# Patient Record
Sex: FEMALE | Race: BLACK OR AFRICAN AMERICAN | Employment: OTHER
[De-identification: names, ages, dates, MRNs, and addresses within clinical notes are randomized per-mention and may not be internally consistent; named-entity substitution may affect disease eponyms.]

---

## 2017-02-23 DIAGNOSIS — I10 ESSENTIAL HYPERTENSION: ICD-10-CM

## 2017-02-25 RX ORDER — METOPROLOL SUCCINATE 50 MG/1
TABLET, EXTENDED RELEASE ORAL
Qty: 30 TABLET | Refills: 5 | Status: SHIPPED | OUTPATIENT
Start: 2017-02-25 | End: 2017-06-07 | Stop reason: SDUPTHER

## 2017-03-24 RX ORDER — INSULIN GLARGINE 100 [IU]/ML
INJECTION, SOLUTION SUBCUTANEOUS
Qty: 4 PEN | Refills: 1 | Status: SHIPPED | OUTPATIENT
Start: 2017-03-24 | End: 2017-06-07 | Stop reason: SDUPTHER

## 2017-04-04 DIAGNOSIS — I10 ESSENTIAL HYPERTENSION: ICD-10-CM

## 2017-04-05 RX ORDER — VALSARTAN 80 MG/1
TABLET ORAL
Qty: 30 TABLET | Refills: 4 | Status: SHIPPED | OUTPATIENT
Start: 2017-04-05 | End: 2017-06-07 | Stop reason: SDUPTHER

## 2017-04-27 DIAGNOSIS — I10 ESSENTIAL HYPERTENSION: ICD-10-CM

## 2017-04-27 RX ORDER — AMLODIPINE BESYLATE 10 MG/1
TABLET ORAL
Qty: 30 TABLET | Refills: 0 | Status: SHIPPED | OUTPATIENT
Start: 2017-04-27 | End: 2017-06-05 | Stop reason: SDUPTHER

## 2017-05-03 DIAGNOSIS — D50.0 IRON DEFICIENCY ANEMIA DUE TO CHRONIC BLOOD LOSS: Primary | ICD-10-CM

## 2017-05-05 ENCOUNTER — HOSPITAL ENCOUNTER (OUTPATIENT)
Facility: MEDICAL CENTER | Age: 72
End: 2017-05-05
Payer: MEDICARE

## 2017-05-12 ENCOUNTER — HOSPITAL ENCOUNTER (OUTPATIENT)
Facility: MEDICAL CENTER | Age: 72
End: 2017-05-12
Payer: MEDICARE

## 2017-06-05 DIAGNOSIS — I10 ESSENTIAL HYPERTENSION: ICD-10-CM

## 2017-06-06 RX ORDER — AMLODIPINE BESYLATE 10 MG/1
TABLET ORAL
Qty: 30 TABLET | Refills: 5 | Status: SHIPPED | OUTPATIENT
Start: 2017-06-06 | End: 2017-06-07 | Stop reason: SDUPTHER

## 2017-06-07 ENCOUNTER — OFFICE VISIT (OUTPATIENT)
Dept: INTERNAL MEDICINE CLINIC | Age: 72
End: 2017-06-07
Payer: MEDICARE

## 2017-06-07 ENCOUNTER — HOSPITAL ENCOUNTER (OUTPATIENT)
Age: 72
Setting detail: SPECIMEN
Discharge: HOME | End: 2017-06-07

## 2017-06-07 VITALS
OXYGEN SATURATION: 94 % | RESPIRATION RATE: 16 BRPM | SYSTOLIC BLOOD PRESSURE: 144 MMHG | WEIGHT: 231 LBS | DIASTOLIC BLOOD PRESSURE: 80 MMHG | HEIGHT: 67 IN | TEMPERATURE: 98.6 F | HEART RATE: 88 BPM | BODY MASS INDEX: 36.26 KG/M2

## 2017-06-07 DIAGNOSIS — Z12.39 SCREENING BREAST EXAMINATION: ICD-10-CM

## 2017-06-07 DIAGNOSIS — Z11.59 NEED FOR HEPATITIS C SCREENING TEST: ICD-10-CM

## 2017-06-07 DIAGNOSIS — Z12.39 BREAST CANCER SCREENING: ICD-10-CM

## 2017-06-07 DIAGNOSIS — Z12.11 COLON CANCER SCREENING: ICD-10-CM

## 2017-06-07 DIAGNOSIS — M27.0 TORUS PALATINUS: ICD-10-CM

## 2017-06-07 DIAGNOSIS — D50.0 IRON DEFICIENCY ANEMIA DUE TO CHRONIC BLOOD LOSS: ICD-10-CM

## 2017-06-07 DIAGNOSIS — Z12.31 ENCOUNTER FOR MAMMOGRAM TO ESTABLISH BASELINE MAMMOGRAM: ICD-10-CM

## 2017-06-07 DIAGNOSIS — K21.9 GASTROESOPHAGEAL REFLUX DISEASE WITHOUT ESOPHAGITIS: ICD-10-CM

## 2017-06-07 DIAGNOSIS — I10 ESSENTIAL HYPERTENSION: ICD-10-CM

## 2017-06-07 LAB
CREATININE URINE: 112.4 MG/DL (ref 28–217)
HBA1C MFR BLD: 8.8 %
HEPATITIS C ANTIBODY: NONREACTIVE
IRON SATURATION: 27 % (ref 20–55)
IRON: 76 UG/DL (ref 37–145)
MICROALBUMIN/CREAT 24H UR: 1719 MG/L
MICROALBUMIN/CREAT UR-RTO: 1529 MCG/MG CREAT
TOTAL IRON BINDING CAPACITY: 281 UG/DL (ref 250–450)
UNSATURATED IRON BINDING CAPACITY: 205 UG/DL (ref 112–347)

## 2017-06-07 PROCEDURE — G8427 DOCREV CUR MEDS BY ELIG CLIN: HCPCS | Performed by: FAMILY MEDICINE

## 2017-06-07 PROCEDURE — 83036 HEMOGLOBIN GLYCOSYLATED A1C: CPT | Performed by: FAMILY MEDICINE

## 2017-06-07 PROCEDURE — 4040F PNEUMOC VAC/ADMIN/RCVD: CPT | Performed by: FAMILY MEDICINE

## 2017-06-07 PROCEDURE — 3014F SCREEN MAMMO DOC REV: CPT | Performed by: FAMILY MEDICINE

## 2017-06-07 PROCEDURE — 3017F COLORECTAL CA SCREEN DOC REV: CPT | Performed by: FAMILY MEDICINE

## 2017-06-07 PROCEDURE — 1090F PRES/ABSN URINE INCON ASSESS: CPT | Performed by: FAMILY MEDICINE

## 2017-06-07 PROCEDURE — G8400 PT W/DXA NO RESULTS DOC: HCPCS | Performed by: FAMILY MEDICINE

## 2017-06-07 PROCEDURE — 3045F PR MOST RECENT HEMOGLOBIN A1C LEVEL 7.0-9.0%: CPT | Performed by: FAMILY MEDICINE

## 2017-06-07 PROCEDURE — 1123F ACP DISCUSS/DSCN MKR DOCD: CPT | Performed by: FAMILY MEDICINE

## 2017-06-07 PROCEDURE — G8419 CALC BMI OUT NRM PARAM NOF/U: HCPCS | Performed by: FAMILY MEDICINE

## 2017-06-07 PROCEDURE — 4004F PT TOBACCO SCREEN RCVD TLK: CPT | Performed by: FAMILY MEDICINE

## 2017-06-07 PROCEDURE — 99214 OFFICE O/P EST MOD 30 MIN: CPT | Performed by: FAMILY MEDICINE

## 2017-06-07 RX ORDER — AMLODIPINE BESYLATE 10 MG/1
TABLET ORAL
Qty: 90 TABLET | Refills: 1 | Status: SHIPPED | OUTPATIENT
Start: 2017-06-07 | End: 2018-01-24 | Stop reason: SDUPTHER

## 2017-06-07 RX ORDER — VALSARTAN 80 MG/1
TABLET ORAL
Qty: 90 TABLET | Refills: 1 | Status: SHIPPED | OUTPATIENT
Start: 2017-06-07 | End: 2018-05-31 | Stop reason: SDUPTHER

## 2017-06-07 RX ORDER — METOPROLOL SUCCINATE 50 MG/1
TABLET, EXTENDED RELEASE ORAL
Qty: 90 TABLET | Refills: 1 | Status: SHIPPED | OUTPATIENT
Start: 2017-06-07 | End: 2017-09-26 | Stop reason: SDUPTHER

## 2017-06-07 RX ORDER — METFORMIN HYDROCHLORIDE 500 MG/1
TABLET, EXTENDED RELEASE ORAL
Qty: 180 TABLET | Refills: 1 | Status: SHIPPED | OUTPATIENT
Start: 2017-06-07 | End: 2018-11-28 | Stop reason: SDUPTHER

## 2017-06-07 ASSESSMENT — ENCOUNTER SYMPTOMS
CHEST TIGHTNESS: 0
TROUBLE SWALLOWING: 0
COUGH: 0
STRIDOR: 0
ABDOMINAL DISTENTION: 0
EYE PAIN: 0
CONSTIPATION: 0
EYE DISCHARGE: 0
ABDOMINAL PAIN: 0
FACIAL SWELLING: 0
COLOR CHANGE: 0
ANAL BLEEDING: 0
WHEEZING: 0
SHORTNESS OF BREATH: 0
EYE REDNESS: 0
BACK PAIN: 0
SORE THROAT: 0

## 2017-06-07 ASSESSMENT — PATIENT HEALTH QUESTIONNAIRE - PHQ9
SUM OF ALL RESPONSES TO PHQ QUESTIONS 1-9: 0
SUM OF ALL RESPONSES TO PHQ9 QUESTIONS 1 & 2: 0
1. LITTLE INTEREST OR PLEASURE IN DOING THINGS: 0
2. FEELING DOWN, DEPRESSED OR HOPELESS: 0

## 2017-06-12 RX ORDER — MULTIVITAMIN
1 TABLET ORAL DAILY
Qty: 30 TABLET | Refills: 0 | Status: SHIPPED | OUTPATIENT
Start: 2017-06-12 | End: 2018-08-21 | Stop reason: SDUPTHER

## 2017-07-31 ENCOUNTER — OFFICE VISIT (OUTPATIENT)
Dept: PODIATRY | Age: 72
End: 2017-07-31
Payer: MEDICARE

## 2017-07-31 VITALS
TEMPERATURE: 98.3 F | SYSTOLIC BLOOD PRESSURE: 143 MMHG | HEART RATE: 97 BPM | BODY MASS INDEX: 36.88 KG/M2 | DIASTOLIC BLOOD PRESSURE: 78 MMHG | WEIGHT: 235 LBS | HEIGHT: 67 IN

## 2017-07-31 DIAGNOSIS — M20.5X2 HALLUX LIMITUS, LEFT: ICD-10-CM

## 2017-07-31 DIAGNOSIS — L84 CALLUS OF FOOT: ICD-10-CM

## 2017-07-31 DIAGNOSIS — M20.5X1 HALLUX LIMITUS, RIGHT: ICD-10-CM

## 2017-07-31 DIAGNOSIS — L60.2 NAIL DISORDER (ONYCHOGRYPHOSIS): ICD-10-CM

## 2017-07-31 PROCEDURE — 11721 DEBRIDE NAIL 6 OR MORE: CPT | Performed by: STUDENT IN AN ORGANIZED HEALTH CARE EDUCATION/TRAINING PROGRAM

## 2017-07-31 PROCEDURE — 99203 OFFICE O/P NEW LOW 30 MIN: CPT | Performed by: STUDENT IN AN ORGANIZED HEALTH CARE EDUCATION/TRAINING PROGRAM

## 2017-07-31 PROCEDURE — 11055 PARING/CUTG B9 HYPRKER LES 1: CPT | Performed by: STUDENT IN AN ORGANIZED HEALTH CARE EDUCATION/TRAINING PROGRAM

## 2017-08-28 ENCOUNTER — TELEPHONE (OUTPATIENT)
Dept: PODIATRY | Age: 72
End: 2017-08-28

## 2017-09-02 RX ORDER — PEN NEEDLE, DIABETIC 31 G X1/4"
NEEDLE, DISPOSABLE MISCELLANEOUS
Qty: 100 EACH | Refills: 5 | Status: SHIPPED | OUTPATIENT
Start: 2017-09-02 | End: 2018-12-26 | Stop reason: SDUPTHER

## 2017-09-06 DIAGNOSIS — K21.9 GASTROESOPHAGEAL REFLUX DISEASE WITHOUT ESOPHAGITIS: ICD-10-CM

## 2017-09-09 RX ORDER — OMEPRAZOLE 20 MG/1
CAPSULE, DELAYED RELEASE ORAL
Qty: 30 CAPSULE | Refills: 4 | Status: SHIPPED | OUTPATIENT
Start: 2017-09-09 | End: 2018-03-26 | Stop reason: SDUPTHER

## 2017-09-11 DIAGNOSIS — J45.20 MILD INTERMITTENT ASTHMA WITHOUT COMPLICATION: ICD-10-CM

## 2017-09-26 DIAGNOSIS — I10 ESSENTIAL HYPERTENSION: ICD-10-CM

## 2017-10-01 RX ORDER — METOPROLOL SUCCINATE 50 MG/1
TABLET, EXTENDED RELEASE ORAL
Qty: 30 TABLET | Refills: 5 | Status: SHIPPED | OUTPATIENT
Start: 2017-10-01 | End: 2018-01-24 | Stop reason: SDUPTHER

## 2018-01-24 DIAGNOSIS — I10 ESSENTIAL HYPERTENSION: ICD-10-CM

## 2018-01-24 RX ORDER — AMLODIPINE BESYLATE 10 MG/1
TABLET ORAL
Qty: 30 TABLET | Refills: 1 | Status: SHIPPED | OUTPATIENT
Start: 2018-01-24 | End: 2018-03-30 | Stop reason: SDUPTHER

## 2018-01-24 RX ORDER — METOPROLOL SUCCINATE 50 MG/1
50 TABLET, EXTENDED RELEASE ORAL DAILY
Qty: 30 TABLET | Refills: 1 | Status: SHIPPED | OUTPATIENT
Start: 2018-01-24 | End: 2018-07-24 | Stop reason: SDUPTHER

## 2018-03-14 DIAGNOSIS — K21.9 GASTROESOPHAGEAL REFLUX DISEASE WITHOUT ESOPHAGITIS: ICD-10-CM

## 2018-03-15 RX ORDER — OMEPRAZOLE 20 MG/1
CAPSULE, DELAYED RELEASE ORAL
Qty: 30 CAPSULE | Refills: 4 | OUTPATIENT
Start: 2018-03-15

## 2018-03-26 ENCOUNTER — OFFICE VISIT (OUTPATIENT)
Dept: INTERNAL MEDICINE CLINIC | Age: 73
End: 2018-03-26
Payer: MEDICARE

## 2018-03-26 VITALS
HEIGHT: 67 IN | WEIGHT: 233.8 LBS | BODY MASS INDEX: 36.7 KG/M2 | HEART RATE: 68 BPM | RESPIRATION RATE: 16 BRPM | OXYGEN SATURATION: 94 % | DIASTOLIC BLOOD PRESSURE: 88 MMHG | SYSTOLIC BLOOD PRESSURE: 138 MMHG

## 2018-03-26 DIAGNOSIS — K21.9 GASTROESOPHAGEAL REFLUX DISEASE WITHOUT ESOPHAGITIS: ICD-10-CM

## 2018-03-26 DIAGNOSIS — E78.49 OTHER HYPERLIPIDEMIA: ICD-10-CM

## 2018-03-26 DIAGNOSIS — I10 ESSENTIAL HYPERTENSION: ICD-10-CM

## 2018-03-26 DIAGNOSIS — Z78.0 POST-MENOPAUSAL: ICD-10-CM

## 2018-03-26 DIAGNOSIS — J44.9 CHRONIC OBSTRUCTIVE PULMONARY DISEASE, UNSPECIFIED COPD TYPE (HCC): ICD-10-CM

## 2018-03-26 DIAGNOSIS — Z72.0 TOBACCO ABUSE: ICD-10-CM

## 2018-03-26 DIAGNOSIS — I83.90 VARICOSE VEIN OF LEG: ICD-10-CM

## 2018-03-26 DIAGNOSIS — E87.6 HYPOKALEMIA: ICD-10-CM

## 2018-03-26 PROCEDURE — 3017F COLORECTAL CA SCREEN DOC REV: CPT | Performed by: FAMILY MEDICINE

## 2018-03-26 PROCEDURE — 99214 OFFICE O/P EST MOD 30 MIN: CPT | Performed by: FAMILY MEDICINE

## 2018-03-26 PROCEDURE — G8484 FLU IMMUNIZE NO ADMIN: HCPCS | Performed by: FAMILY MEDICINE

## 2018-03-26 PROCEDURE — 4040F PNEUMOC VAC/ADMIN/RCVD: CPT | Performed by: FAMILY MEDICINE

## 2018-03-26 PROCEDURE — 3014F SCREEN MAMMO DOC REV: CPT | Performed by: FAMILY MEDICINE

## 2018-03-26 PROCEDURE — 1090F PRES/ABSN URINE INCON ASSESS: CPT | Performed by: FAMILY MEDICINE

## 2018-03-26 PROCEDURE — 4004F PT TOBACCO SCREEN RCVD TLK: CPT | Performed by: FAMILY MEDICINE

## 2018-03-26 PROCEDURE — G8400 PT W/DXA NO RESULTS DOC: HCPCS | Performed by: FAMILY MEDICINE

## 2018-03-26 PROCEDURE — 3046F HEMOGLOBIN A1C LEVEL >9.0%: CPT | Performed by: FAMILY MEDICINE

## 2018-03-26 PROCEDURE — 1123F ACP DISCUSS/DSCN MKR DOCD: CPT | Performed by: FAMILY MEDICINE

## 2018-03-26 PROCEDURE — 3023F SPIROM DOC REV: CPT | Performed by: FAMILY MEDICINE

## 2018-03-26 PROCEDURE — G8427 DOCREV CUR MEDS BY ELIG CLIN: HCPCS | Performed by: FAMILY MEDICINE

## 2018-03-26 PROCEDURE — G8926 SPIRO NO PERF OR DOC: HCPCS | Performed by: FAMILY MEDICINE

## 2018-03-26 PROCEDURE — G8417 CALC BMI ABV UP PARAM F/U: HCPCS | Performed by: FAMILY MEDICINE

## 2018-03-26 RX ORDER — OMEPRAZOLE 20 MG/1
20 CAPSULE, DELAYED RELEASE ORAL DAILY
Qty: 30 CAPSULE | Refills: 2 | Status: ON HOLD | OUTPATIENT
Start: 2018-03-26 | End: 2020-08-24

## 2018-03-26 RX ORDER — POTASSIUM CHLORIDE 20 MEQ/1
TABLET, EXTENDED RELEASE ORAL
Qty: 90 TABLET | Refills: 3 | Status: SHIPPED | OUTPATIENT
Start: 2018-03-26 | End: 2019-06-27 | Stop reason: SDUPTHER

## 2018-03-26 RX ORDER — BUDESONIDE AND FORMOTEROL FUMARATE DIHYDRATE 160; 4.5 UG/1; UG/1
2 AEROSOL RESPIRATORY (INHALATION) 2 TIMES DAILY
Qty: 1 INHALER | Refills: 2 | Status: SHIPPED | OUTPATIENT
Start: 2018-03-26 | End: 2018-11-20 | Stop reason: SDUPTHER

## 2018-03-26 RX ORDER — INSULIN LISPRO 100 [IU]/ML
INJECTION, SOLUTION INTRAVENOUS; SUBCUTANEOUS
COMMUNITY
Start: 2018-01-12 | End: 2019-01-15 | Stop reason: SDUPTHER

## 2018-03-27 ASSESSMENT — ENCOUNTER SYMPTOMS
GASTROINTESTINAL NEGATIVE: 1
EYES NEGATIVE: 1
RESPIRATORY NEGATIVE: 1
ALLERGIC/IMMUNOLOGIC NEGATIVE: 1

## 2018-03-27 NOTE — PROGRESS NOTES
sounds and intact distal pulses. Pulmonary/Chest: Effort normal and breath sounds normal.   Abdominal: Soft. Bowel sounds are normal.   Genitourinary: Vagina normal and uterus normal.   Musculoskeletal: Normal range of motion. Neurological: She is alert and oriented to person, place, and time. She has normal reflexes. Skin: Skin is warm and dry. Psychiatric: She has a normal mood and affect. Her behavior is normal. Judgment and thought content normal.   Vitals reviewed. Assessment:      1. Uncontrolled type 1 diabetes mellitus with stage 3 chronic kidney disease (HCC)  Lipid Panel    TSH With Reflex Ft4    CBC With Auto Differential    Comprehensive Metabolic Panel    Hemoglobin A1C    HIV Screen    Hepatitis C Antibody    DEXA BONE DENSITY 2 SITES   2. Essential hypertension  Lipid Panel    TSH With Reflex Ft4    CBC With Auto Differential    Comprehensive Metabolic Panel    Hemoglobin A1C    HIV Screen    Hepatitis C Antibody    DEXA BONE DENSITY 2 SITES    potassium chloride (KLOR-CON M) 20 MEQ extended release tablet   3. Other hyperlipidemia  Lipid Panel    TSH With Reflex Ft4    CBC With Auto Differential    Comprehensive Metabolic Panel    Hemoglobin A1C    HIV Screen    Hepatitis C Antibody    DEXA BONE DENSITY 2 SITES   4. Post-menopausal     5. Hypokalemia  potassium chloride (KLOR-CON M) 20 MEQ extended release tablet   6. Gastroesophageal reflux disease without esophagitis  omeprazole (PRILOSEC) 20 MG delayed release capsule   7. Chronic obstructive pulmonary disease, unspecified COPD type ([facility] [address]  CT CHEST DIAGNOSTIC LOW DOSE   8. Tobacco abuse  CT CHEST DIAGNOSTIC LOW DOSE   9. Varicose vein of leg             Plan:      77-year-old -American female is presented for follow-up. She denies any distress, afebrile hemodynamically stable, clinical examination is benign. Insulin-dependent diabetes mellitus with regular follow-up with endocrinology.   Patient stated that his recent A1c has

## 2018-04-16 ENCOUNTER — HOSPITAL ENCOUNTER (OUTPATIENT)
Dept: CT IMAGING | Age: 73
Discharge: HOME | End: 2018-04-18
Payer: MEDICARE

## 2018-04-16 ENCOUNTER — HOSPITAL ENCOUNTER (OUTPATIENT)
Dept: MAMMOGRAPHY | Age: 73
Discharge: HOME | End: 2018-04-18
Payer: MEDICARE

## 2018-04-16 DIAGNOSIS — Z72.0 TOBACCO ABUSE: ICD-10-CM

## 2018-04-16 DIAGNOSIS — I10 ESSENTIAL HYPERTENSION: ICD-10-CM

## 2018-04-16 DIAGNOSIS — J44.9 CHRONIC OBSTRUCTIVE PULMONARY DISEASE, UNSPECIFIED COPD TYPE (HCC): ICD-10-CM

## 2018-04-16 DIAGNOSIS — E78.49 OTHER HYPERLIPIDEMIA: ICD-10-CM

## 2018-04-16 PROCEDURE — 71250 CT THORAX DX C-: CPT

## 2018-04-16 PROCEDURE — 77080 DXA BONE DENSITY AXIAL: CPT

## 2018-05-31 DIAGNOSIS — I10 ESSENTIAL HYPERTENSION: ICD-10-CM

## 2018-05-31 RX ORDER — VALSARTAN 80 MG/1
TABLET ORAL
Qty: 90 TABLET | Refills: 1 | Status: SHIPPED | OUTPATIENT
Start: 2018-05-31 | End: 2019-04-18 | Stop reason: ALTCHOICE

## 2018-07-24 DIAGNOSIS — I10 ESSENTIAL HYPERTENSION: ICD-10-CM

## 2018-07-25 RX ORDER — METOPROLOL SUCCINATE 50 MG/1
50 TABLET, EXTENDED RELEASE ORAL DAILY
Qty: 30 TABLET | Refills: 1 | Status: SHIPPED | OUTPATIENT
Start: 2018-07-25 | End: 2018-11-07 | Stop reason: SDUPTHER

## 2018-08-01 ENCOUNTER — TELEPHONE (OUTPATIENT)
Dept: ONCOLOGY | Age: 73
End: 2018-08-01

## 2018-08-01 NOTE — TELEPHONE ENCOUNTER
RECEIVED REFILL REQUEST FOR ORAL IRON. PT HAS NOT BEEN SEEN 11/2016  WRITER NOTIFIED ROSE PT NEEDS APPT FOR REFILL. WRITER GENERATED PINK SLIP TO FRONT OFFICE TO CONTACT PT TO COORDINATE / OFFER A FOLLOW UP APPT AS WELL.

## 2018-08-08 ENCOUNTER — HOSPITAL ENCOUNTER (OUTPATIENT)
Facility: MEDICAL CENTER | Age: 73
End: 2018-08-08
Payer: MEDICARE

## 2018-08-13 ENCOUNTER — TELEPHONE (OUTPATIENT)
Dept: INFUSION THERAPY | Facility: MEDICAL CENTER | Age: 73
End: 2018-08-13

## 2018-08-21 ENCOUNTER — TELEPHONE (OUTPATIENT)
Dept: INFUSION THERAPY | Facility: MEDICAL CENTER | Age: 73
End: 2018-08-21

## 2018-08-21 DIAGNOSIS — K21.9 GASTROESOPHAGEAL REFLUX DISEASE WITHOUT ESOPHAGITIS: ICD-10-CM

## 2018-08-21 RX ORDER — MULTIVITAMIN
1 TABLET ORAL DAILY
Qty: 90 TABLET | Refills: 0 | Status: SHIPPED | OUTPATIENT
Start: 2018-08-21 | End: 2019-02-13 | Stop reason: SDUPTHER

## 2018-08-21 RX ORDER — OMEPRAZOLE 20 MG/1
CAPSULE, DELAYED RELEASE ORAL
Qty: 30 CAPSULE | Refills: 2 | OUTPATIENT
Start: 2018-08-21

## 2018-10-17 ENCOUNTER — TELEPHONE (OUTPATIENT)
Dept: INTERNAL MEDICINE CLINIC | Age: 73
End: 2018-10-17

## 2018-10-17 DIAGNOSIS — I10 ESSENTIAL HYPERTENSION: Primary | ICD-10-CM

## 2018-10-19 RX ORDER — LOSARTAN POTASSIUM 50 MG/1
50 TABLET ORAL DAILY
Qty: 30 TABLET | Refills: 3 | Status: ON HOLD | OUTPATIENT
Start: 2018-10-19 | End: 2020-08-24

## 2018-11-05 DIAGNOSIS — I10 ESSENTIAL HYPERTENSION: ICD-10-CM

## 2018-11-06 RX ORDER — AMLODIPINE BESYLATE 10 MG/1
TABLET ORAL
Qty: 30 TABLET | Refills: 0 | Status: SHIPPED | OUTPATIENT
Start: 2018-11-06 | End: 2018-12-26 | Stop reason: SDUPTHER

## 2018-11-07 DIAGNOSIS — I10 ESSENTIAL HYPERTENSION: ICD-10-CM

## 2018-11-08 RX ORDER — METOPROLOL SUCCINATE 50 MG/1
50 TABLET, EXTENDED RELEASE ORAL DAILY
Qty: 90 TABLET | Refills: 2 | Status: SHIPPED | OUTPATIENT
Start: 2018-11-08 | End: 2019-12-31

## 2018-11-21 DIAGNOSIS — J45.20 MILD INTERMITTENT ASTHMA WITHOUT COMPLICATION: ICD-10-CM

## 2018-11-21 RX ORDER — ALBUTEROL SULFATE 90 UG/1
2 AEROSOL, METERED RESPIRATORY (INHALATION) EVERY 6 HOURS PRN
Qty: 1 INHALER | Refills: 3 | Status: SHIPPED | OUTPATIENT
Start: 2018-11-21 | End: 2019-10-30 | Stop reason: SDUPTHER

## 2018-11-23 RX ORDER — BUDESONIDE AND FORMOTEROL FUMARATE DIHYDRATE 160; 4.5 UG/1; UG/1
AEROSOL RESPIRATORY (INHALATION)
Qty: 1 INHALER | Refills: 1 | Status: SHIPPED | OUTPATIENT
Start: 2018-11-23 | End: 2019-04-02 | Stop reason: SDUPTHER

## 2018-11-28 RX ORDER — METFORMIN HYDROCHLORIDE 500 MG/1
TABLET, EXTENDED RELEASE ORAL
Qty: 180 TABLET | Refills: 1 | Status: SHIPPED | OUTPATIENT
Start: 2018-11-28 | End: 2019-08-30 | Stop reason: SDUPTHER

## 2018-12-26 DIAGNOSIS — I10 ESSENTIAL HYPERTENSION: ICD-10-CM

## 2018-12-27 RX ORDER — PEN NEEDLE, DIABETIC 31 G X1/4"
1 NEEDLE, DISPOSABLE MISCELLANEOUS 2 TIMES DAILY
Qty: 200 EACH | Refills: 1 | Status: SHIPPED | OUTPATIENT
Start: 2018-12-27 | End: 2020-10-13

## 2018-12-27 RX ORDER — AMLODIPINE BESYLATE 10 MG/1
10 TABLET ORAL DAILY
Qty: 90 TABLET | Refills: 1 | Status: SHIPPED | OUTPATIENT
Start: 2018-12-27 | End: 2019-07-30 | Stop reason: SDUPTHER

## 2019-01-09 RX ORDER — INSULIN LISPRO 100 [IU]/ML
INJECTION, SOLUTION INTRAVENOUS; SUBCUTANEOUS
Qty: 5 PEN | Refills: 2 | OUTPATIENT
Start: 2019-01-09

## 2019-01-14 ENCOUNTER — OFFICE VISIT (OUTPATIENT)
Dept: INTERNAL MEDICINE CLINIC | Age: 74
End: 2019-01-14
Payer: MEDICARE

## 2019-01-14 ENCOUNTER — HOSPITAL ENCOUNTER (OUTPATIENT)
Age: 74
Setting detail: SPECIMEN
Discharge: HOME | End: 2019-01-14
Payer: MEDICARE

## 2019-01-14 VITALS
DIASTOLIC BLOOD PRESSURE: 80 MMHG | WEIGHT: 230.6 LBS | OXYGEN SATURATION: 100 % | RESPIRATION RATE: 24 BRPM | HEART RATE: 97 BPM | SYSTOLIC BLOOD PRESSURE: 140 MMHG | HEIGHT: 67 IN | BODY MASS INDEX: 36.19 KG/M2

## 2019-01-14 DIAGNOSIS — I10 ESSENTIAL HYPERTENSION: ICD-10-CM

## 2019-01-14 DIAGNOSIS — Z72.0 TOBACCO ABUSE: ICD-10-CM

## 2019-01-14 DIAGNOSIS — E78.49 OTHER HYPERLIPIDEMIA: ICD-10-CM

## 2019-01-14 DIAGNOSIS — K21.9 GASTROESOPHAGEAL REFLUX DISEASE WITHOUT ESOPHAGITIS: ICD-10-CM

## 2019-01-14 DIAGNOSIS — Z12.39 BREAST SCREENING: ICD-10-CM

## 2019-01-14 DIAGNOSIS — Z28.21 PNEUMOCOCCAL VACCINATION DECLINED: ICD-10-CM

## 2019-01-14 DIAGNOSIS — I87.2 VENOUS INSUFFICIENCY, PERIPHERAL: ICD-10-CM

## 2019-01-14 DIAGNOSIS — D50.8 OTHER IRON DEFICIENCY ANEMIA: ICD-10-CM

## 2019-01-14 DIAGNOSIS — Z28.21 INFLUENZA VACCINATION DECLINED: ICD-10-CM

## 2019-01-14 DIAGNOSIS — J44.9 CHRONIC OBSTRUCTIVE PULMONARY DISEASE, UNSPECIFIED COPD TYPE (HCC): ICD-10-CM

## 2019-01-14 DIAGNOSIS — Z71.6 TOBACCO ABUSE COUNSELING: ICD-10-CM

## 2019-01-14 LAB
ALBUMIN SERPL-MCNC: 3.2 G/DL (ref 3.5–5.2)
ALBUMIN/GLOBULIN RATIO: 0.8 (ref 1–2.5)
ALP BLD-CCNC: 125 U/L (ref 35–104)
ALT SERPL-CCNC: 12 U/L (ref 5–33)
ANION GAP SERPL CALCULATED.3IONS-SCNC: 16 MMOL/L (ref 9–17)
AST SERPL-CCNC: 14 U/L
BILIRUB SERPL-MCNC: 0.29 MG/DL (ref 0.3–1.2)
BUN BLDV-MCNC: 9 MG/DL (ref 8–23)
BUN/CREAT BLD: ABNORMAL (ref 9–20)
CALCIUM SERPL-MCNC: 8.7 MG/DL (ref 8.6–10.4)
CHLORIDE BLD-SCNC: 103 MMOL/L (ref 98–107)
CHOLESTEROL/HDL RATIO: 2.1
CHOLESTEROL: 105 MG/DL
CO2: 25 MMOL/L (ref 20–31)
CREAT SERPL-MCNC: 0.82 MG/DL (ref 0.5–0.9)
CREATININE URINE: 226.5 MG/DL (ref 28–217)
GFR AFRICAN AMERICAN: >60 ML/MIN
GFR NON-AFRICAN AMERICAN: >60 ML/MIN
GFR SERPL CREATININE-BSD FRML MDRD: ABNORMAL ML/MIN/{1.73_M2}
GFR SERPL CREATININE-BSD FRML MDRD: ABNORMAL ML/MIN/{1.73_M2}
GLUCOSE BLD-MCNC: 119 MG/DL (ref 70–99)
HCT VFR BLD CALC: 33.4 % (ref 36.3–47.1)
HDLC SERPL-MCNC: 49 MG/DL
HEMOGLOBIN: 9 G/DL (ref 11.9–15.1)
LDL CHOLESTEROL: 32 MG/DL (ref 0–130)
MCH RBC QN AUTO: 18.1 PG (ref 25.2–33.5)
MCHC RBC AUTO-ENTMCNC: 26.9 G/DL (ref 28.4–34.8)
MCV RBC AUTO: 67.3 FL (ref 82.6–102.9)
MICROALBUMIN/CREAT 24H UR: 4183 MG/L
MICROALBUMIN/CREAT UR-RTO: 1847 MCG/MG CREAT
NRBC AUTOMATED: 0.4 PER 100 WBC
PDW BLD-RTO: 20.4 % (ref 11.8–14.4)
PLATELET # BLD: 460 K/UL (ref 138–453)
PMV BLD AUTO: 10.3 FL (ref 8.1–13.5)
POTASSIUM SERPL-SCNC: 3.1 MMOL/L (ref 3.7–5.3)
RBC # BLD: 4.96 M/UL (ref 3.95–5.11)
SODIUM BLD-SCNC: 144 MMOL/L (ref 135–144)
TOTAL PROTEIN: 7.3 G/DL (ref 6.4–8.3)
TRIGL SERPL-MCNC: 119 MG/DL
TSH SERPL DL<=0.05 MIU/L-ACNC: 1.32 MIU/L (ref 0.3–5)
VLDLC SERPL CALC-MCNC: NORMAL MG/DL (ref 1–30)
WBC # BLD: 17.4 K/UL (ref 3.5–11.3)

## 2019-01-14 PROCEDURE — 2022F DILAT RTA XM EVC RTNOPTHY: CPT | Performed by: FAMILY MEDICINE

## 2019-01-14 PROCEDURE — G8399 PT W/DXA RESULTS DOCUMENT: HCPCS | Performed by: FAMILY MEDICINE

## 2019-01-14 PROCEDURE — G8926 SPIRO NO PERF OR DOC: HCPCS | Performed by: FAMILY MEDICINE

## 2019-01-14 PROCEDURE — 1101F PT FALLS ASSESS-DOCD LE1/YR: CPT | Performed by: FAMILY MEDICINE

## 2019-01-14 PROCEDURE — 4004F PT TOBACCO SCREEN RCVD TLK: CPT | Performed by: FAMILY MEDICINE

## 2019-01-14 PROCEDURE — 1090F PRES/ABSN URINE INCON ASSESS: CPT | Performed by: FAMILY MEDICINE

## 2019-01-14 PROCEDURE — 4040F PNEUMOC VAC/ADMIN/RCVD: CPT | Performed by: FAMILY MEDICINE

## 2019-01-14 PROCEDURE — 3023F SPIROM DOC REV: CPT | Performed by: FAMILY MEDICINE

## 2019-01-14 PROCEDURE — 1123F ACP DISCUSS/DSCN MKR DOCD: CPT | Performed by: FAMILY MEDICINE

## 2019-01-14 PROCEDURE — 3046F HEMOGLOBIN A1C LEVEL >9.0%: CPT | Performed by: FAMILY MEDICINE

## 2019-01-14 PROCEDURE — 3017F COLORECTAL CA SCREEN DOC REV: CPT | Performed by: FAMILY MEDICINE

## 2019-01-14 PROCEDURE — G8484 FLU IMMUNIZE NO ADMIN: HCPCS | Performed by: FAMILY MEDICINE

## 2019-01-14 PROCEDURE — G8417 CALC BMI ABV UP PARAM F/U: HCPCS | Performed by: FAMILY MEDICINE

## 2019-01-14 PROCEDURE — G8427 DOCREV CUR MEDS BY ELIG CLIN: HCPCS | Performed by: FAMILY MEDICINE

## 2019-01-14 PROCEDURE — 99214 OFFICE O/P EST MOD 30 MIN: CPT | Performed by: FAMILY MEDICINE

## 2019-01-14 ASSESSMENT — PATIENT HEALTH QUESTIONNAIRE - PHQ9
SUM OF ALL RESPONSES TO PHQ QUESTIONS 1-9: 0
1. LITTLE INTEREST OR PLEASURE IN DOING THINGS: 0
2. FEELING DOWN, DEPRESSED OR HOPELESS: 0
SUM OF ALL RESPONSES TO PHQ QUESTIONS 1-9: 0
SUM OF ALL RESPONSES TO PHQ9 QUESTIONS 1 & 2: 0

## 2019-01-14 ASSESSMENT — ENCOUNTER SYMPTOMS
ALLERGIC/IMMUNOLOGIC NEGATIVE: 1
EYES NEGATIVE: 1
BACK PAIN: 1
RESPIRATORY NEGATIVE: 1
GASTROINTESTINAL NEGATIVE: 1

## 2019-01-15 LAB
ESTIMATED AVERAGE GLUCOSE: 177 MG/DL
HBA1C MFR BLD: 7.8 % (ref 4–6)
VITAMIN D 25-HYDROXY: 12.7 NG/ML (ref 30–100)

## 2019-01-16 RX ORDER — INSULIN LISPRO 100 [IU]/ML
20 INJECTION, SOLUTION INTRAVENOUS; SUBCUTANEOUS DAILY
Qty: 5 PEN | Refills: 0 | Status: SHIPPED | OUTPATIENT
Start: 2019-01-16 | End: 2019-04-01 | Stop reason: SDUPTHER

## 2019-01-16 RX ORDER — ERGOCALCIFEROL (VITAMIN D2) 10 MCG
1 TABLET ORAL 2 TIMES DAILY
Qty: 180 TABLET | Refills: 0 | Status: SHIPPED | OUTPATIENT
Start: 2019-01-16 | End: 2019-09-25 | Stop reason: ALTCHOICE

## 2019-02-13 RX ORDER — MULTIVITAMIN WITH FOLIC ACID 400 MCG
TABLET ORAL
Qty: 90 TABLET | Refills: 0 | Status: SHIPPED | OUTPATIENT
Start: 2019-02-13

## 2019-04-02 RX ORDER — INSULIN LISPRO 100 [IU]/ML
INJECTION, SOLUTION INTRAVENOUS; SUBCUTANEOUS
Qty: 15 PEN | Refills: 0 | Status: SHIPPED | OUTPATIENT
Start: 2019-04-02 | End: 2019-07-25 | Stop reason: SDUPTHER

## 2019-04-02 NOTE — TELEPHONE ENCOUNTER
Last visit: 1/4/19   Last Med refill: 1/16/19  Does patient have enough medication for 72 hours: NA    Next Visit Date:  Future Appointments   Date Time Provider Toni Briana   4/18/2019  2:15 PM Ranjith Christianson MD [facility] [address] Maintenance   Topic Date Due    Pneumococcal 65+ years Vaccine (1 of 2 - PCV13) 12/03/2010    Diabetic foot exam  07/31/2018    Shingles Vaccine (1 of 2) 04/03/2019 (Originally 12/3/1995)    Diabetic retinal exam  04/16/2019 (Originally 12/3/1955)    DTaP/Tdap/Td vaccine (1 - Tdap) 04/16/2019 (Originally 12/3/1964)    Low dose CT lung screening  04/16/2019    Breast cancer screen  07/07/2019    Flu vaccine (Season Ended) 09/01/2019    A1C test (Diabetic or Prediabetic)  01/14/2020    Diabetic microalbuminuria test  01/14/2020    Lipid screen  01/14/2020    Potassium monitoring  01/14/2020    Creatinine monitoring  01/14/2020    Colon cancer screen colonoscopy  10/19/2026    DEXA (modify frequency per FRAX score)  Completed    Hepatitis C screen  Completed       Hemoglobin A1C (%)   Date Value   01/14/2019 7.8 (H)   06/07/2017 8.8   11/08/2016 8.0             ( goal A1C is < 7)   Microalb/Crt.  Ratio (mcg/mg creat)   Date Value   01/14/2019 1,847 (H)     LDL Cholesterol (mg/dL)   Date Value   01/14/2019 32   08/02/2016 38     LDL Calculated (mg/dL)   Date Value   01/08/2014 83       (goal LDL is <100)   AST (U/L)   Date Value   01/14/2019 14     ALT (U/L)   Date Value   01/14/2019 12     BUN (mg/dL)   Date Value   01/14/2019 9     BP Readings from Last 3 Encounters:   01/14/19 (!) 140/80   03/26/18 138/88   07/31/17 (!) 143/78          (goal 120/80)    All Future Testing planned in [facility]  Lab Frequency Next Occurrence   ANGELI DIGITAL SCREEN W OR WO CAD BILATERAL Once 01/14/2019               Patient Active Problem List:     GERD (gastroesophageal reflux disease)     Anemia     Hypertension     Vertigo     Diabetes mellitus out of control ([address]     Torus

## 2019-04-03 NOTE — TELEPHONE ENCOUNTER
Last visit: 1/14/19  Last Med refill: 11/23/18  Does patient have enough medication for 72 hours: NA    Next Visit Date:  Future Appointments   Date Time Provider Toni Warren   4/18/2019  2:15 PM Damien Mccurdy MD [employer] [facility] [address] Maintenance   Topic Date Due    Pneumococcal 65+ years Vaccine (1 of 2 - PCV13) 12/03/2010    Diabetic foot exam  07/31/2018    Shingles Vaccine (1 of 2) 04/03/2019 (Originally 12/3/1995)    Diabetic retinal exam  04/16/2019 (Originally 12/3/1955)    DTaP/Tdap/Td vaccine (1 - Tdap) 04/16/2019 (Originally 12/3/1964)    Low dose CT lung screening  04/16/2019    Breast cancer screen  07/07/2019    Flu vaccine (Season Ended) 09/01/2019    A1C test (Diabetic or Prediabetic)  01/14/2020    Diabetic microalbuminuria test  01/14/2020    Lipid screen  01/14/2020    Potassium monitoring  01/14/2020    Creatinine monitoring  01/14/2020    Colon cancer screen colonoscopy  10/19/2026    DEXA (modify frequency per FRAX score)  Completed    Hepatitis C screen  Completed       Hemoglobin A1C (%)   Date Value   01/14/2019 7.8 (H)   06/07/2017 8.8   11/08/2016 8.0             ( goal A1C is < 7)   Microalb/Crt.  Ratio (mcg/mg creat)   Date Value   01/14/2019 1,847 (H)     LDL Cholesterol (mg/dL)   Date Value   01/14/2019 32   08/02/2016 38     LDL Calculated (mg/dL)   Date Value   01/08/2014 83       (goal LDL is <100)   AST (U/L)   Date Value   01/14/2019 14     ALT (U/L)   Date Value   01/14/2019 12     BUN (mg/dL)   Date Value   01/14/2019 9     BP Readings from Last 3 Encounters:   01/14/19 (!) 140/80   03/26/18 138/88   07/31/17 (!) 143/78          (goal 120/80)    All Future Testing planned in [facility]  Lab Frequency Next Occurrence   ANGELI DIGITAL SCREEN W OR WO CAD BILATERAL Once 01/14/2019               Patient Active Problem List:     GERD (gastroesophageal reflux disease)     Anemia     Hypertension     Vertigo     Diabetes mellitus out of control ([address]     Torus palatinus     Iron deficiency anemia due to chronic blood loss     History of lower GI bleeding     Other hyperlipidemia     Chronic obstructive pulmonary disease (HCC)     Varicose vein of leg     Influenza vaccination declined     Pneumococcal vaccination declined

## 2019-04-04 RX ORDER — BUDESONIDE AND FORMOTEROL FUMARATE DIHYDRATE 160; 4.5 UG/1; UG/1
AEROSOL RESPIRATORY (INHALATION)
Qty: 1 INHALER | Refills: 2 | Status: ON HOLD | OUTPATIENT
Start: 2019-04-04 | End: 2020-08-24

## 2019-04-18 ENCOUNTER — HOSPITAL ENCOUNTER (OUTPATIENT)
Age: 74
Setting detail: SPECIMEN
Discharge: HOME | End: 2019-04-18
Payer: MEDICARE

## 2019-04-18 ENCOUNTER — OFFICE VISIT (OUTPATIENT)
Dept: INTERNAL MEDICINE CLINIC | Age: 74
End: 2019-04-18
Payer: MEDICARE

## 2019-04-18 VITALS
HEIGHT: 67 IN | OXYGEN SATURATION: 94 % | SYSTOLIC BLOOD PRESSURE: 130 MMHG | DIASTOLIC BLOOD PRESSURE: 70 MMHG | RESPIRATION RATE: 24 BRPM | WEIGHT: 228.8 LBS | BODY MASS INDEX: 35.91 KG/M2 | HEART RATE: 100 BPM

## 2019-04-18 DIAGNOSIS — D50.0 IRON DEFICIENCY ANEMIA DUE TO CHRONIC BLOOD LOSS: ICD-10-CM

## 2019-04-18 DIAGNOSIS — E78.49 OTHER HYPERLIPIDEMIA: ICD-10-CM

## 2019-04-18 DIAGNOSIS — Z28.21 PNEUMOCOCCAL VACCINATION DECLINED: ICD-10-CM

## 2019-04-18 DIAGNOSIS — J44.1 COPD WITH ACUTE EXACERBATION (HCC): Primary | ICD-10-CM

## 2019-04-18 DIAGNOSIS — I83.201: ICD-10-CM

## 2019-04-18 DIAGNOSIS — I10 ESSENTIAL HYPERTENSION: ICD-10-CM

## 2019-04-18 DIAGNOSIS — D50.8 OTHER IRON DEFICIENCY ANEMIA: ICD-10-CM

## 2019-04-18 DIAGNOSIS — E87.6 HYPOKALEMIA: ICD-10-CM

## 2019-04-18 DIAGNOSIS — L97.104: ICD-10-CM

## 2019-04-18 DIAGNOSIS — R42 VERTIGO: ICD-10-CM

## 2019-04-18 DIAGNOSIS — Z28.21 INFLUENZA VACCINATION DECLINED: ICD-10-CM

## 2019-04-18 DIAGNOSIS — K21.9 GASTROESOPHAGEAL REFLUX DISEASE WITHOUT ESOPHAGITIS: ICD-10-CM

## 2019-04-18 DIAGNOSIS — F17.200 SMOKER: ICD-10-CM

## 2019-04-18 DIAGNOSIS — E10.65 UNCONTROLLED TYPE 1 DIABETES MELLITUS WITH HYPERGLYCEMIA (HCC): ICD-10-CM

## 2019-04-18 DIAGNOSIS — E08.65 DIABETES MELLITUS DUE TO UNDERLYING CONDITION, UNCONTROLLED, WITH HYPERGLYCEMIA (HCC): ICD-10-CM

## 2019-04-18 LAB — POTASSIUM SERPL-SCNC: 3.5 MMOL/L (ref 3.7–5.3)

## 2019-04-18 PROCEDURE — G8399 PT W/DXA RESULTS DOCUMENT: HCPCS | Performed by: FAMILY MEDICINE

## 2019-04-18 PROCEDURE — 3017F COLORECTAL CA SCREEN DOC REV: CPT | Performed by: FAMILY MEDICINE

## 2019-04-18 PROCEDURE — 2022F DILAT RTA XM EVC RTNOPTHY: CPT | Performed by: FAMILY MEDICINE

## 2019-04-18 PROCEDURE — 3023F SPIROM DOC REV: CPT | Performed by: FAMILY MEDICINE

## 2019-04-18 PROCEDURE — 99214 OFFICE O/P EST MOD 30 MIN: CPT | Performed by: FAMILY MEDICINE

## 2019-04-18 PROCEDURE — 1123F ACP DISCUSS/DSCN MKR DOCD: CPT | Performed by: FAMILY MEDICINE

## 2019-04-18 PROCEDURE — 3045F PR MOST RECENT HEMOGLOBIN A1C LEVEL 7.0-9.0%: CPT | Performed by: FAMILY MEDICINE

## 2019-04-18 PROCEDURE — 1090F PRES/ABSN URINE INCON ASSESS: CPT | Performed by: FAMILY MEDICINE

## 2019-04-18 PROCEDURE — 4040F PNEUMOC VAC/ADMIN/RCVD: CPT | Performed by: FAMILY MEDICINE

## 2019-04-18 PROCEDURE — G8417 CALC BMI ABV UP PARAM F/U: HCPCS | Performed by: FAMILY MEDICINE

## 2019-04-18 PROCEDURE — G8926 SPIRO NO PERF OR DOC: HCPCS | Performed by: FAMILY MEDICINE

## 2019-04-18 PROCEDURE — 4004F PT TOBACCO SCREEN RCVD TLK: CPT | Performed by: FAMILY MEDICINE

## 2019-04-18 PROCEDURE — G8427 DOCREV CUR MEDS BY ELIG CLIN: HCPCS | Performed by: FAMILY MEDICINE

## 2019-04-18 RX ORDER — PREDNISONE 10 MG/1
10 TABLET ORAL DAILY
Qty: 10 TABLET | Refills: 0 | Status: SHIPPED | OUTPATIENT
Start: 2019-04-18 | End: 2019-04-28

## 2019-04-18 ASSESSMENT — ENCOUNTER SYMPTOMS
RESPIRATORY NEGATIVE: 1
EYES NEGATIVE: 1
GASTROINTESTINAL NEGATIVE: 1
ALLERGIC/IMMUNOLOGIC NEGATIVE: 1
BACK PAIN: 1

## 2019-04-18 NOTE — PROGRESS NOTES
Subjective:      Patient ID: Isai Elias is a 68 y.o. female. Diabetes   She presents for her follow-up diabetic visit. She has type 1 diabetes mellitus. Her disease course has been fluctuating. There are no hypoglycemic associated symptoms. There are no diabetic associated symptoms. There are no hypoglycemic complications. Symptoms are stable. Diabetic complications include peripheral neuropathy. Risk factors for coronary artery disease include diabetes mellitus, dyslipidemia, hypertension, sedentary lifestyle, post-menopausal and tobacco exposure. Current diabetic treatment includes insulin injections and oral agent (monotherapy). She is compliant with treatment most of the time. Her weight is stable. She is following a generally unhealthy, high fat/cholesterol, high salt and low fiber diet. Meal planning includes ADA exchanges, avoidance of concentrated sweets, calorie counting and carbohydrate counting. She has had a previous visit with a dietitian. She participates in exercise three times a week. Home blood sugar record trend: She did not bring her Accu-Chek log. An ACE inhibitor/angiotensin II receptor blocker is being taken. Review of Systems   Constitutional: Negative. HENT: Negative. Eyes: Negative. Respiratory: Negative. Cardiovascular: Negative. Gastrointestinal: Negative. Endocrine: Negative. Musculoskeletal: Positive for arthralgias and back pain. Skin: Negative. Allergic/Immunologic: Negative. Neurological: Negative. Hematological: Negative. Psychiatric/Behavioral: Negative. Past family and social history unremarkable. Objective:   Physical Exam   Constitutional: She is oriented to person, place, and time. She appears well-developed and well-nourished. HENT:   Head: Normocephalic and atraumatic.    Right Ear: External ear normal.   Left Ear: External ear normal.   Mouth/Throat: Oropharynx is clear and moist.   Eyes: Pupils are equal, round, and reactive to light. Conjunctivae and EOM are normal.   Neck: Normal range of motion. Neck supple. Cardiovascular: Normal rate, regular rhythm, normal heart sounds and intact distal pulses. Pulmonary/Chest: Effort normal and breath sounds normal.   Abdominal: Soft. Bowel sounds are normal.   Genitourinary: Vagina normal and uterus normal.   Musculoskeletal: Normal range of motion. Degenerative polyarthralgia   Neurological: She is alert and oriented to person, place, and time. She has normal reflexes. Skin: Skin is warm and dry. Psychiatric: She has a normal mood and affect. Her behavior is normal. Judgment and thought content normal.   Nursing note and vitals reviewed. Assessment:       Diagnosis Orders   1. COPD with acute exacerbation ([address]     2. Uncontrolled type 1 diabetes mellitus with hyperglycemia ([address]     3. Gastroesophageal reflux disease without esophagitis     4. Other iron deficiency anemia     5. Essential hypertension     6. Vertigo     7. Iron deficiency anemia due to chronic blood loss     8. Other hyperlipidemia     9. Influenza vaccination declined     10. Pneumococcal vaccination declined     11. Varicose veins of lower extremity with inflammation, with ulcer of thigh with necrosis of bone, unspecified laterality ([address]     12. Smoker     13. Hypokalemia  Potassium           Plan:      59-year-old -American female time for follow-up. She is afebrile hemodynamically stable  Noncompliance. She is noncompliant to medication as directed, ADA 1800 diet and endocrinology follow-up. Insulin-dependent diabetes mellitus with A1c of 7.8. She is advised compliance advised to keep daily Accu-Chek log for office review in order to better monitor her diabetes. I will order A1c on the way out  Hypertension we aim to keep her blood pressure equal or less than 130/80.   Consume less than 2 g of salt a day  Hyperlipidemia on statin that she is tolerating well  Monofilament testing is

## 2019-04-18 NOTE — PROGRESS NOTES
Chronic Disease Visit Information    BP Readings from Last 3 Encounters:   01/14/19 (!) 140/80   03/26/18 138/88   07/31/17 (!) 143/78          Hemoglobin A1C (%)   Date Value   01/14/2019 7.8 (H)   06/07/2017 8.8   11/08/2016 8.0     Microalb/Crt. Ratio (mcg/mg creat)   Date Value   01/14/2019 1,847 (H)     LDL Cholesterol (mg/dL)   Date Value   01/14/2019 32     LDL Calculated (mg/dL)   Date Value   01/08/2014 83     HDL (mg/dL)   Date Value   01/14/2019 49     BUN (mg/dL)   Date Value   01/14/2019 9     CREATININE (mg/dL)   Date Value   01/14/2019 0.82     Glucose (mg/dL)   Date Value   01/14/2019 119 (H)            Have you changed or started any medications since your last visit including any over-the-counter medicines, vitamins, or herbal medicines? no   Are you having any side effects from any of your medications? -  no  Have you stopped taking any of your medications? Is so, why? -  no    Have you seen any other physician or provider since your last visit? No  Have you had any other diagnostic tests since your last visit? No  Have you been seen in the emergency room and/or had an admission to a hospital since we last saw you? No  Have you had your annual diabetic retinal (eye) exam? No  Have you had your routine dental cleaning in the past 6 months? no    Have you activated your [employer] account? If not, what are your barriers?  Yes     Patient Care Team:  Connor Lopez MD as PCP - General (Family Medicine)  Lenora Lewis MD as Consulting Physician (Hematology and Oncology)  Andrew Turpin MD as Consulting Physician (Endocrinology)         Medical History Review  Past Medical, Family, and Social History reviewed and does not contribute to the patient presenting condition    Health Maintenance   Topic Date Due    Diabetic retinal exam  12/03/1955    DTaP/Tdap/Td vaccine (1 - Tdap) 12/03/1964    Shingles Vaccine (1 of 2) 12/03/1995    Pneumococcal 65+ years Vaccine (1 of 2 - PCV13) 12/03/2010    Diabetic foot exam  07/31/2018    Low dose CT lung screening  04/16/2019    Breast cancer screen  07/07/2019    Flu vaccine (Season Ended) 09/01/2019    A1C test (Diabetic or Prediabetic)  01/14/2020    Diabetic microalbuminuria test  01/14/2020    Lipid screen  01/14/2020    Potassium monitoring  01/14/2020    Creatinine monitoring  01/14/2020    Colon cancer screen colonoscopy  10/19/2026    DEXA (modify frequency per FRAX score)  Completed    Hepatitis C screen  Completed

## 2019-04-19 LAB
ESTIMATED AVERAGE GLUCOSE: 131 MG/DL
HBA1C MFR BLD: 6.2 % (ref 4–6)

## 2019-06-27 DIAGNOSIS — E87.6 HYPOKALEMIA: ICD-10-CM

## 2019-06-27 DIAGNOSIS — I10 ESSENTIAL HYPERTENSION: ICD-10-CM

## 2019-06-28 RX ORDER — POTASSIUM CHLORIDE 20 MEQ/1
TABLET, EXTENDED RELEASE ORAL
Qty: 90 TABLET | Refills: 0 | Status: SHIPPED | OUTPATIENT
Start: 2019-06-28 | End: 2020-01-07

## 2019-06-28 NOTE — TELEPHONE ENCOUNTER
Last filled 3/26/18 #90 with 3 RF  Last seen 4/18/19    Next Visit Date:  Future Appointments   Date Time Provider Toni Briana   7/19/2019  2:15 PM Saud Rogers  [address] Maintenance   Topic Date Due    Annual Wellness Visit (AWV)  12/03/2008    Low dose CT lung screening  04/16/2019    Breast cancer screen  07/07/2019    Diabetic foot exam  04/18/2020 (Originally 7/31/2018)    Shingles Vaccine (1 of 2) 04/18/2020 (Originally 12/3/1995)    Pneumococcal 65+ years Vaccine (1 of 2 - PCV13) 04/23/2020 (Originally 12/3/2010)    Diabetic retinal exam  04/27/2020 (Originally 12/3/1955)    DTaP/Tdap/Td vaccine (1 - Tdap) 05/29/2020 (Originally 12/3/1964)    Flu vaccine (Season Ended) 09/01/2019    Diabetic microalbuminuria test  01/14/2020    Lipid screen  01/14/2020    Creatinine monitoring  01/14/2020    A1C test (Diabetic or Prediabetic)  04/18/2020    Potassium monitoring  04/18/2020    Colon cancer screen colonoscopy  10/19/2026    DEXA (modify frequency per FRAX score)  Completed    Hepatitis C screen  Completed       Hemoglobin A1C (%)   Date Value   04/18/2019 6.2 (H)   01/14/2019 7.8 (H)   06/07/2017 8.8             ( goal A1C is < 7)   Microalb/Crt.  Ratio (mcg/mg creat)   Date Value   01/14/2019 1,847 (H)     LDL Cholesterol (mg/dL)   Date Value   01/14/2019 32   08/02/2016 38     LDL Calculated (mg/dL)   Date Value   01/08/2014 83       (goal LDL is <100)   AST (U/L)   Date Value   01/14/2019 14     ALT (U/L)   Date Value   01/14/2019 12     BUN (mg/dL)   Date Value   01/14/2019 9     BP Readings from Last 3 Encounters:   04/18/19 130/70   01/14/19 (!) 140/80   03/26/18 138/88          (goal 120/80)    All Future Testing planned in [facility]  Lab Frequency Next Occurrence   ANGELI DIGITAL SCREEN W OR WO CAD BILATERAL Once 01/14/2019               Patient Active Problem List:     GERD (gastroesophageal reflux disease)     Hypertension     Vertigo     Iron deficiency anemia due to chronic blood loss     Other hyperlipidemia     Chronic obstructive pulmonary disease (HCC)     Varicose vein of leg     Influenza vaccination declined     Pneumococcal vaccination declined     Smoker     Uncontrolled type 1 diabetes mellitus with hyperglycemia ([facility] [address]

## 2019-07-26 RX ORDER — INSULIN LISPRO 100 [IU]/ML
INJECTION, SOLUTION INTRAVENOUS; SUBCUTANEOUS
Qty: 2 PEN | Refills: 0 | Status: SHIPPED | OUTPATIENT
Start: 2019-07-26 | End: 2019-08-30 | Stop reason: SDUPTHER

## 2019-07-30 DIAGNOSIS — I10 ESSENTIAL HYPERTENSION: ICD-10-CM

## 2019-07-31 RX ORDER — AMLODIPINE BESYLATE 10 MG/1
TABLET ORAL
Qty: 90 TABLET | Refills: 1 | Status: SHIPPED | OUTPATIENT
Start: 2019-07-31 | End: 2020-03-02

## 2019-07-31 NOTE — TELEPHONE ENCOUNTER
Last seen 12/27/18 #90 with 1 RF  Last seen 4/18/19. Pt will call back to schedule due to lack of transportation. Pended med for 30 days only. Next Visit Date:  No future appointments. Health Maintenance   Topic Date Due    Annual Wellness Visit (AWV)  12/03/2008    Low dose CT lung screening  04/16/2019    Breast cancer screen  07/07/2019    Diabetic foot exam  04/18/2020 (Originally 7/31/2018)    Shingles Vaccine (1 of 2) 04/18/2020 (Originally 12/3/1995)    Pneumococcal 65+ years Vaccine (1 of 2 - PCV13) 04/23/2020 (Originally 12/3/2010)    Diabetic retinal exam  04/27/2020 (Originally 12/3/1955)    DTaP/Tdap/Td vaccine (1 - Tdap) 05/29/2020 (Originally 12/3/1964)    Flu vaccine (1) 09/01/2019    Diabetic microalbuminuria test  01/14/2020    Lipid screen  01/14/2020    Creatinine monitoring  01/14/2020    A1C test (Diabetic or Prediabetic)  04/18/2020    Potassium monitoring  04/18/2020    Colon cancer screen colonoscopy  10/19/2026    DEXA (modify frequency per FRAX score)  Completed    Hepatitis C screen  Completed       Hemoglobin A1C (%)   Date Value   04/18/2019 6.2 (H)   01/14/2019 7.8 (H)   06/07/2017 8.8             ( goal A1C is < 7)   Microalb/Crt.  Ratio (mcg/mg creat)   Date Value   01/14/2019 1,847 (H)     LDL Cholesterol (mg/dL)   Date Value   01/14/2019 32   08/02/2016 38     LDL Calculated (mg/dL)   Date Value   01/08/2014 83       (goal LDL is <100)   AST (U/L)   Date Value   01/14/2019 14     ALT (U/L)   Date Value   01/14/2019 12     BUN (mg/dL)   Date Value   01/14/2019 9     BP Readings from Last 3 Encounters:   04/18/19 130/70   01/14/19 (!) 140/80   03/26/18 138/88          (goal 120/80)    All Future Testing planned in [facility]  Lab Frequency Next Occurrence   ANGELI DIGITAL SCREEN W OR WO CAD BILATERAL Once 01/14/2019               Patient Active Problem List:     GERD (gastroesophageal reflux disease)     Hypertension     Vertigo     Iron deficiency anemia due to

## 2019-09-03 RX ORDER — INSULIN LISPRO 100 [IU]/ML
INJECTION, SOLUTION INTRAVENOUS; SUBCUTANEOUS
Qty: 1 PEN | Refills: 0 | Status: SHIPPED | OUTPATIENT
Start: 2019-09-03 | End: 2019-09-20 | Stop reason: SDUPTHER

## 2019-09-03 RX ORDER — METFORMIN HYDROCHLORIDE 500 MG/1
TABLET, EXTENDED RELEASE ORAL
Qty: 60 TABLET | Refills: 0 | Status: SHIPPED | OUTPATIENT
Start: 2019-09-03 | End: 2019-10-09 | Stop reason: SDUPTHER

## 2019-09-03 NOTE — TELEPHONE ENCOUNTER
Last visit: 4-18-19  Last Med refill:   Does patient have enough medication for 72 hours: No:     Next Visit Date:  No future appointments. Health Maintenance   Topic Date Due    Annual Wellness Visit (AWV)  12/03/2008    Low dose CT lung screening  04/16/2019    Breast cancer screen  07/07/2019    Flu vaccine (1) 09/01/2019    Diabetic foot exam  04/18/2020 (Originally 7/31/2018)    Shingles Vaccine (1 of 2) 04/18/2020 (Originally 12/3/1995)    Pneumococcal 65+ years Vaccine (1 of 2 - PCV13) 04/23/2020 (Originally 12/3/2010)    Diabetic retinal exam  04/27/2020 (Originally [birth year]/3/1955)    DTaP/Tdap/Td vaccine (1 - Tdap) 05/29/2020 (Originally 12/3/1964)    Diabetic microalbuminuria test  01/14/2020    Lipid screen  01/14/2020    Creatinine monitoring  01/14/2020    A1C test (Diabetic or Prediabetic)  04/18/2020    Potassium monitoring  04/18/2020    Colon cancer screen colonoscopy  10/19/2026    DEXA (modify frequency per FRAX score)  Completed    Hepatitis C screen  Completed       Hemoglobin A1C (%)   Date Value   04/18/2019 6.2 (H)   01/14/2019 7.8 (H)   06/07/2017 8.8             ( goal A1C is < 7)   Microalb/Crt.  Ratio (mcg/mg creat)   Date Value   01/14/2019 1,847 (H)     LDL Cholesterol (mg/dL)   Date Value   01/14/2019 32   08/02/2016 38     LDL Calculated (mg/dL)   Date Value   01/08/2014 83       (goal LDL is <100)   AST (U/L)   Date Value   01/14/2019 14     ALT (U/L)   Date Value   01/14/2019 12     BUN (mg/dL)   Date Value   01/14/2019 9     BP Readings from Last 3 Encounters:   04/18/19 130/70   01/14/19 (!) 140/80   03/26/18 138/88          (goal 120/80)    All Future Testing planned in [facility]  Lab Frequency Next Occurrence   ANGELI DIGITAL SCREEN W OR WO CAD BILATERAL Once 01/14/2019               Patient Active Problem List:     GERD (gastroesophageal reflux disease)     Hypertension     Vertigo     Iron deficiency anemia due to chronic blood loss     Other hyperlipidemia

## 2019-09-21 RX ORDER — INSULIN LISPRO 100 [IU]/ML
INJECTION, SOLUTION INTRAVENOUS; SUBCUTANEOUS
Qty: 6 PEN | Refills: 3 | Status: SHIPPED | OUTPATIENT
Start: 2019-09-21 | End: 2021-03-29 | Stop reason: SDUPTHER

## 2019-09-24 ENCOUNTER — OFFICE VISIT (OUTPATIENT)
Dept: INTERNAL MEDICINE CLINIC | Age: 74
End: 2019-09-24
Payer: MEDICARE

## 2019-09-24 ENCOUNTER — HOSPITAL ENCOUNTER (OUTPATIENT)
Age: 74
Setting detail: SPECIMEN
Discharge: HOME | End: 2019-09-24
Payer: MEDICARE

## 2019-09-24 VITALS
WEIGHT: 214.4 LBS | BODY MASS INDEX: 33.65 KG/M2 | RESPIRATION RATE: 24 BRPM | HEIGHT: 67 IN | OXYGEN SATURATION: 97 % | DIASTOLIC BLOOD PRESSURE: 80 MMHG | SYSTOLIC BLOOD PRESSURE: 140 MMHG | HEART RATE: 91 BPM

## 2019-09-24 DIAGNOSIS — F17.200 SMOKER: ICD-10-CM

## 2019-09-24 DIAGNOSIS — I87.2 EDEMA OF BOTH LOWER EXTREMITIES DUE TO PERIPHERAL VENOUS INSUFFICIENCY: ICD-10-CM

## 2019-09-24 DIAGNOSIS — E10.65 UNCONTROLLED TYPE 1 DIABETES MELLITUS WITH HYPERGLYCEMIA (HCC): ICD-10-CM

## 2019-09-24 DIAGNOSIS — E78.49 OTHER HYPERLIPIDEMIA: ICD-10-CM

## 2019-09-24 DIAGNOSIS — I10 ESSENTIAL HYPERTENSION: Primary | ICD-10-CM

## 2019-09-24 DIAGNOSIS — D50.0 IRON DEFICIENCY ANEMIA DUE TO CHRONIC BLOOD LOSS: ICD-10-CM

## 2019-09-24 DIAGNOSIS — Z28.21 PNEUMOCOCCAL VACCINATION DECLINED: ICD-10-CM

## 2019-09-24 DIAGNOSIS — Z28.21 INFLUENZA VACCINATION DECLINED: ICD-10-CM

## 2019-09-24 DIAGNOSIS — R42 VERTIGO: ICD-10-CM

## 2019-09-24 DIAGNOSIS — K21.9 GASTROESOPHAGEAL REFLUX DISEASE WITHOUT ESOPHAGITIS: ICD-10-CM

## 2019-09-24 DIAGNOSIS — J44.9 CHRONIC OBSTRUCTIVE PULMONARY DISEASE, UNSPECIFIED COPD TYPE (HCC): ICD-10-CM

## 2019-09-24 PROBLEM — I83.90 VARICOSE VEIN OF LEG: Status: RESOLVED | Noted: 2018-03-26 | Resolved: 2019-09-24

## 2019-09-24 LAB
ALBUMIN SERPL-MCNC: 3.4 G/DL (ref 3.5–5.2)
ALBUMIN/GLOBULIN RATIO: 0.8 (ref 1–2.5)
ALP BLD-CCNC: 120 U/L (ref 35–104)
ALT SERPL-CCNC: 7 U/L (ref 5–33)
ANION GAP SERPL CALCULATED.3IONS-SCNC: 17 MMOL/L (ref 9–17)
AST SERPL-CCNC: 14 U/L
BILIRUB SERPL-MCNC: 0.24 MG/DL (ref 0.3–1.2)
BUN BLDV-MCNC: 11 MG/DL (ref 8–23)
BUN/CREAT BLD: ABNORMAL (ref 9–20)
CALCIUM SERPL-MCNC: 8.8 MG/DL (ref 8.6–10.4)
CHLORIDE BLD-SCNC: 106 MMOL/L (ref 98–107)
CO2: 21 MMOL/L (ref 20–31)
CREAT SERPL-MCNC: 0.76 MG/DL (ref 0.5–0.9)
GFR AFRICAN AMERICAN: >60 ML/MIN
GFR NON-AFRICAN AMERICAN: >60 ML/MIN
GFR SERPL CREATININE-BSD FRML MDRD: ABNORMAL ML/MIN/{1.73_M2}
GFR SERPL CREATININE-BSD FRML MDRD: ABNORMAL ML/MIN/{1.73_M2}
GLUCOSE BLD-MCNC: 89 MG/DL (ref 70–99)
HCT VFR BLD CALC: 36.5 % (ref 36.3–47.1)
HEMOGLOBIN: 8.9 G/DL (ref 11.9–15.1)
MCH RBC QN AUTO: 16 PG (ref 25.2–33.5)
MCHC RBC AUTO-ENTMCNC: 24.4 G/DL (ref 28.4–34.8)
MCV RBC AUTO: 65.6 FL (ref 82.6–102.9)
NRBC AUTOMATED: 0 PER 100 WBC
PDW BLD-RTO: 24.7 % (ref 11.8–14.4)
PLATELET # BLD: ABNORMAL K/UL (ref 138–453)
PLATELET, FLUORESCENCE: 460 K/UL (ref 138–453)
PLATELET, IMMATURE FRACTION: 2.8 % (ref 1.1–10.3)
PMV BLD AUTO: ABNORMAL FL (ref 8.1–13.5)
POTASSIUM SERPL-SCNC: 3.6 MMOL/L (ref 3.7–5.3)
RBC # BLD: 5.56 M/UL (ref 3.95–5.11)
SODIUM BLD-SCNC: 144 MMOL/L (ref 135–144)
TOTAL PROTEIN: 7.7 G/DL (ref 6.4–8.3)
TSH SERPL DL<=0.05 MIU/L-ACNC: 2 MIU/L (ref 0.3–5)
VITAMIN D 25-HYDROXY: 15 NG/ML (ref 30–100)
WBC # BLD: 11.6 K/UL (ref 3.5–11.3)

## 2019-09-24 PROCEDURE — 3017F COLORECTAL CA SCREEN DOC REV: CPT | Performed by: FAMILY MEDICINE

## 2019-09-24 PROCEDURE — G8427 DOCREV CUR MEDS BY ELIG CLIN: HCPCS | Performed by: FAMILY MEDICINE

## 2019-09-24 PROCEDURE — G8399 PT W/DXA RESULTS DOCUMENT: HCPCS | Performed by: FAMILY MEDICINE

## 2019-09-24 PROCEDURE — G8926 SPIRO NO PERF OR DOC: HCPCS | Performed by: FAMILY MEDICINE

## 2019-09-24 PROCEDURE — 99214 OFFICE O/P EST MOD 30 MIN: CPT | Performed by: FAMILY MEDICINE

## 2019-09-24 PROCEDURE — G8417 CALC BMI ABV UP PARAM F/U: HCPCS | Performed by: FAMILY MEDICINE

## 2019-09-24 PROCEDURE — 3044F HG A1C LEVEL LT 7.0%: CPT | Performed by: FAMILY MEDICINE

## 2019-09-24 PROCEDURE — 4004F PT TOBACCO SCREEN RCVD TLK: CPT | Performed by: FAMILY MEDICINE

## 2019-09-24 PROCEDURE — 1090F PRES/ABSN URINE INCON ASSESS: CPT | Performed by: FAMILY MEDICINE

## 2019-09-24 PROCEDURE — 2022F DILAT RTA XM EVC RTNOPTHY: CPT | Performed by: FAMILY MEDICINE

## 2019-09-24 PROCEDURE — 3023F SPIROM DOC REV: CPT | Performed by: FAMILY MEDICINE

## 2019-09-24 PROCEDURE — 1123F ACP DISCUSS/DSCN MKR DOCD: CPT | Performed by: FAMILY MEDICINE

## 2019-09-24 PROCEDURE — 4040F PNEUMOC VAC/ADMIN/RCVD: CPT | Performed by: FAMILY MEDICINE

## 2019-09-24 NOTE — PROGRESS NOTES
Subjective:      Patient ID: Raciel De La Torre is a 68 y.o. female. Diabetes   She presents for her follow-up diabetic visit. She has type 1 diabetes mellitus. Her disease course has been fluctuating. Hypoglycemia symptoms include nervousness/anxiousness. There are no diabetic associated symptoms. There are no hypoglycemic complications. Symptoms are stable. Risk factors for coronary artery disease include diabetes mellitus, dyslipidemia, hypertension, sedentary lifestyle and post-menopausal. Current diabetic treatment includes oral agent (monotherapy) and insulin injections. She is compliant with treatment most of the time. Her weight is decreasing steadily. She is following a generally healthy diet. Meal planning includes ADA exchanges, avoidance of concentrated sweets, calorie counting and carbohydrate counting. She has had a previous visit with a dietitian. She participates in exercise three times a week. Her home blood glucose trend is fluctuating minimally. An ACE inhibitor/angiotensin II receptor blocker is being taken. Eye exam is current. Review of Systems   Constitutional: Negative. HENT: Negative. Eyes: Negative. Respiratory: Negative. Cardiovascular: Negative. Gastrointestinal: Negative. Endocrine: Negative. Musculoskeletal: Positive for arthralgias. Skin: Negative. Allergic/Immunologic: Negative. Neurological: Negative. Hematological: Negative. Psychiatric/Behavioral: The patient is nervous/anxious. Past family and social history unremarkable. Diagnosis Orders   1. Essential hypertension     2. Edema of both lower extremities due to peripheral venous insufficiency     3. Chronic obstructive pulmonary disease, unspecified COPD type ([address]     4. Gastroesophageal reflux disease without esophagitis     5.  Uncontrolled type 1 diabetes mellitus with hyperglycemia (HCC)  CBC    Comprehensive Metabolic Panel    Hemoglobin A1C    Microalbumin, Ur    TSH without Ur    TSH without Reflex    Vitamin D 25 Hydroxy   9. Influenza vaccination declined     10. Pneumococcal vaccination declined     11. Smoker             Plan:      66-year-old -American female returns for follow-up. She is afebrile hemodynamically stable, clinical examination is benign  Insulin-dependent diabetes mellitus with A1c of 6.3. Accu-Chek log shows moderate fluctuation. She is advised to comply with ADA 1800 diet, daily moderate exercise and lifestyle change. Patient denies hypoglycemic event  Hypertension well-controlled with random blood pressure equal less than 130/80. Consume less than 2 g of salt a day  Hyperlipidemia and statin that she is tolerating well  Monofilament testing is unremarkable. She is counseled on diabetic foot care  Vertigo stable at baseline. Fall precaution is advised  COPD. She continued to smoke despite advised and does not express any desire to quit anytime soon. Once again she is counseled, quit it, alternative to consider. No recent respiratory decompensation  Once again she declined influenza pneumococcal vaccine  Peripheral venous insufficiency. She is asymptomatic. Increase activity as tolerated, leg elevation, quit tobacco.  Xerosis cutis with improvement to moisturizing lotion  She looks anxious, however, denies being depressed  Chronic anemia. She denies epigastric symptom or tarry stool or weight loss. Continue ferrous sulfate. 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 EGD in the past with biopsy showed minimal chronic inflammation. Further recommendations to follow  Med list reviewed, refills provided  This note is created with a voice recognition program and while intend to generate a document that accurately reflects the content of the visit, no guarantee can be provided that every mistake has been identified and corrected by editing.           Corky Mancia MD

## 2019-09-25 ENCOUNTER — TELEPHONE (OUTPATIENT)
Dept: INTERNAL MEDICINE CLINIC | Age: 74
End: 2019-09-25

## 2019-09-25 LAB
CREATININE URINE: 75.7 MG/DL (ref 28–217)
ESTIMATED AVERAGE GLUCOSE: 171 MG/DL
HBA1C MFR BLD: 7.6 % (ref 4–6)
MICROALBUMIN/CREAT 24H UR: 1224 MG/L
MICROALBUMIN/CREAT UR-RTO: 1617 MCG/MG CREAT

## 2019-09-25 RX ORDER — ERGOCALCIFEROL (VITAMIN D2) 10 MCG
1 TABLET ORAL 2 TIMES DAILY
Qty: 180 TABLET | Refills: 2 | Status: SHIPPED | OUTPATIENT
Start: 2019-09-25 | End: 2021-10-27

## 2019-09-25 ASSESSMENT — ENCOUNTER SYMPTOMS
GASTROINTESTINAL NEGATIVE: 1
RESPIRATORY NEGATIVE: 1
EYES NEGATIVE: 1
ALLERGIC/IMMUNOLOGIC NEGATIVE: 1

## 2019-10-10 RX ORDER — METFORMIN HYDROCHLORIDE 500 MG/1
TABLET, EXTENDED RELEASE ORAL
Qty: 180 TABLET | Refills: 1 | Status: SHIPPED | OUTPATIENT
Start: 2019-10-10 | End: 2020-08-10

## 2019-10-30 DIAGNOSIS — J45.20 MILD INTERMITTENT ASTHMA WITHOUT COMPLICATION: ICD-10-CM

## 2019-12-13 ENCOUNTER — TELEPHONE (OUTPATIENT)
Dept: INTERNAL MEDICINE CLINIC | Age: 74
End: 2019-12-13

## 2019-12-17 ENCOUNTER — OFFICE VISIT (OUTPATIENT)
Dept: INTERNAL MEDICINE CLINIC | Age: 74
End: 2019-12-17
Payer: MEDICARE

## 2019-12-17 VITALS
HEART RATE: 103 BPM | SYSTOLIC BLOOD PRESSURE: 140 MMHG | OXYGEN SATURATION: 98 % | BODY MASS INDEX: 34.15 KG/M2 | WEIGHT: 217.6 LBS | DIASTOLIC BLOOD PRESSURE: 80 MMHG | HEIGHT: 67 IN | TEMPERATURE: 97.8 F

## 2019-12-17 DIAGNOSIS — R42 VERTIGO: ICD-10-CM

## 2019-12-17 DIAGNOSIS — Z99.81 OXYGEN DEPENDENT: ICD-10-CM

## 2019-12-17 DIAGNOSIS — I10 ESSENTIAL HYPERTENSION: ICD-10-CM

## 2019-12-17 DIAGNOSIS — Z28.21 INFLUENZA VACCINATION DECLINED: ICD-10-CM

## 2019-12-17 DIAGNOSIS — Z28.21 PNEUMOCOCCAL VACCINATION DECLINED: ICD-10-CM

## 2019-12-17 DIAGNOSIS — D68.32 WARFARIN-INDUCED COAGULOPATHY (HCC): ICD-10-CM

## 2019-12-17 DIAGNOSIS — J96.11 CHRONIC RESPIRATORY FAILURE WITH HYPOXIA (HCC): ICD-10-CM

## 2019-12-17 DIAGNOSIS — J44.9 CHRONIC OBSTRUCTIVE PULMONARY DISEASE, UNSPECIFIED COPD TYPE (HCC): ICD-10-CM

## 2019-12-17 DIAGNOSIS — I87.2 EDEMA OF BOTH LOWER EXTREMITIES DUE TO PERIPHERAL VENOUS INSUFFICIENCY: ICD-10-CM

## 2019-12-17 DIAGNOSIS — D50.0 IRON DEFICIENCY ANEMIA DUE TO CHRONIC BLOOD LOSS: ICD-10-CM

## 2019-12-17 DIAGNOSIS — J18.9 COMMUNITY ACQUIRED PNEUMONIA, UNSPECIFIED LATERALITY: ICD-10-CM

## 2019-12-17 DIAGNOSIS — E78.49 OTHER HYPERLIPIDEMIA: ICD-10-CM

## 2019-12-17 DIAGNOSIS — I48.20 CHRONIC ATRIAL FIBRILLATION (HCC): ICD-10-CM

## 2019-12-17 DIAGNOSIS — I50.20 SYSTOLIC CONGESTIVE HEART FAILURE, UNSPECIFIED HF CHRONICITY (HCC): ICD-10-CM

## 2019-12-17 DIAGNOSIS — T45.515A WARFARIN-INDUCED COAGULOPATHY (HCC): ICD-10-CM

## 2019-12-17 DIAGNOSIS — Z09 HOSPITAL DISCHARGE FOLLOW-UP: Primary | ICD-10-CM

## 2019-12-17 DIAGNOSIS — K21.9 GASTROESOPHAGEAL REFLUX DISEASE WITHOUT ESOPHAGITIS: ICD-10-CM

## 2019-12-17 DIAGNOSIS — F17.200 SMOKER: ICD-10-CM

## 2019-12-17 DIAGNOSIS — E10.65 UNCONTROLLED TYPE 1 DIABETES MELLITUS WITH HYPERGLYCEMIA (HCC): ICD-10-CM

## 2019-12-17 PROCEDURE — 1111F DSCHRG MED/CURRENT MED MERGE: CPT | Performed by: FAMILY MEDICINE

## 2019-12-17 PROCEDURE — 99496 TRANSJ CARE MGMT HIGH F2F 7D: CPT | Performed by: FAMILY MEDICINE

## 2019-12-17 RX ORDER — FUROSEMIDE 40 MG/1
TABLET ORAL
Refills: 0 | COMMUNITY
Start: 2019-12-12 | End: 2020-01-13 | Stop reason: SDUPTHER

## 2019-12-17 ASSESSMENT — ENCOUNTER SYMPTOMS
COUGH: 1
GASTROINTESTINAL NEGATIVE: 1
SHORTNESS OF BREATH: 1
BACK PAIN: 1
EYES NEGATIVE: 1
ALLERGIC/IMMUNOLOGIC NEGATIVE: 1

## 2019-12-17 ASSESSMENT — COPD QUESTIONNAIRES: COPD: 1

## 2019-12-20 ENCOUNTER — TELEPHONE (OUTPATIENT)
Dept: PHARMACY | Age: 74
End: 2019-12-20

## 2019-12-20 RX ORDER — WARFARIN SODIUM 5 MG/1
5 TABLET ORAL DAILY
Qty: 30 TABLET | Refills: 0 | Status: SHIPPED | OUTPATIENT
Start: 2019-12-20 | End: 2020-01-15

## 2019-12-23 ENCOUNTER — HOSPITAL ENCOUNTER (OUTPATIENT)
Dept: PHARMACY | Age: 74
Setting detail: THERAPIES SERIES
Discharge: HOME | End: 2019-12-23
Payer: MEDICARE

## 2019-12-23 DIAGNOSIS — I48.20 CHRONIC ATRIAL FIBRILLATION (HCC): ICD-10-CM

## 2019-12-23 LAB
INR BLD: 1.1
PROTIME: 13.1 SECONDS

## 2019-12-23 PROCEDURE — 99212 OFFICE O/P EST SF 10 MIN: CPT

## 2019-12-23 PROCEDURE — 85610 PROTHROMBIN TIME: CPT

## 2019-12-26 ENCOUNTER — HOSPITAL ENCOUNTER (OUTPATIENT)
Dept: PHARMACY | Age: 74
Setting detail: THERAPIES SERIES
Discharge: HOME | End: 2019-12-26
Payer: MEDICARE

## 2019-12-26 DIAGNOSIS — I48.20 CHRONIC ATRIAL FIBRILLATION (HCC): ICD-10-CM

## 2019-12-26 LAB
INR BLD: 1.6
PROTIME: 19.2 SECONDS

## 2019-12-26 PROCEDURE — 99212 OFFICE O/P EST SF 10 MIN: CPT

## 2019-12-26 PROCEDURE — 85610 PROTHROMBIN TIME: CPT

## 2019-12-27 DIAGNOSIS — I10 ESSENTIAL HYPERTENSION: ICD-10-CM

## 2019-12-30 ENCOUNTER — HOSPITAL ENCOUNTER (OUTPATIENT)
Dept: PHARMACY | Age: 74
Setting detail: THERAPIES SERIES
Discharge: HOME | End: 2019-12-30
Payer: MEDICARE

## 2019-12-30 DIAGNOSIS — I48.20 CHRONIC ATRIAL FIBRILLATION (HCC): ICD-10-CM

## 2019-12-30 LAB
INR BLD: 3.8
PROTIME: 45.8 SECONDS

## 2019-12-30 PROCEDURE — 99212 OFFICE O/P EST SF 10 MIN: CPT

## 2019-12-30 PROCEDURE — 85610 PROTHROMBIN TIME: CPT

## 2019-12-31 RX ORDER — METOPROLOL SUCCINATE 50 MG/1
TABLET, EXTENDED RELEASE ORAL
Qty: 90 TABLET | Refills: 1 | Status: SHIPPED | OUTPATIENT
Start: 2019-12-31 | End: 2020-04-07

## 2020-01-02 ENCOUNTER — HOSPITAL ENCOUNTER (OUTPATIENT)
Dept: PHARMACY | Age: 75
Setting detail: THERAPIES SERIES
Discharge: HOME | End: 2020-01-02
Payer: MEDICARE

## 2020-01-02 LAB
INR BLD: 1.4
PROTIME: 16.9 SECONDS

## 2020-01-02 PROCEDURE — 85610 PROTHROMBIN TIME: CPT

## 2020-01-02 PROCEDURE — 99212 OFFICE O/P EST SF 10 MIN: CPT

## 2020-01-02 NOTE — PROGRESS NOTES
Patient states she did not take her dose yesterday. No bleeding or thromboembolic side effects noted. No significant med or dietary changes. No significant recent illness or disease state changes. PT/INR done in office per protocol. INR is 1.4 which is subtherapeutic - still in adjustment phase. Warfarin regimen will be continued with 10mg today then 5mg daily. Will retest in 4 days. Patient understands dosing directions and information discussed. Dosing schedule and follow up appointment given to patient. Progress note routed to referring physicians office. Patient acknowledges working in consult agreement with pharmacist as referred by his/her physician.       Tamica Alvarado, 1/2/2020 3:09 PM

## 2020-01-06 ENCOUNTER — HOSPITAL ENCOUNTER (OUTPATIENT)
Dept: PHARMACY | Age: 75
Setting detail: THERAPIES SERIES
Discharge: HOME | End: 2020-01-06
Payer: MEDICARE

## 2020-01-06 LAB
INR BLD: 1.4
PROTIME: 16.5 SECONDS

## 2020-01-06 PROCEDURE — 85610 PROTHROMBIN TIME: CPT

## 2020-01-06 PROCEDURE — 99212 OFFICE O/P EST SF 10 MIN: CPT

## 2020-01-06 NOTE — PROGRESS NOTES
Patient states she missed her dose on 1/2 when she was supposed to take the 10mg dose. No bleeding or thromboembolic side effects noted. No significant med or dietary changes. No significant recent illness or disease state changes. PT/INR done in office per protocol. INR is 1.4 which is subtherapeutic. Daughter has set up pill box for patient which will hopefully aid in compliance. Warfarin regimen will be continued with 10mg x 1 today then 5mg daily. Will retest in 3 days to assess. Patient understands dosing directions and information discussed. Dosing schedule and follow up appointment given to patient. Progress note routed to referring physicians office. Patient acknowledges working in consult agreement with pharmacist as referred by his/her physician.       Erin Stafford, 1/6/2020 8:33 AM

## 2020-01-07 ENCOUNTER — TELEPHONE (OUTPATIENT)
Dept: INTERNAL MEDICINE CLINIC | Age: 75
End: 2020-01-07

## 2020-01-07 RX ORDER — POTASSIUM CHLORIDE 20 MEQ/1
TABLET, EXTENDED RELEASE ORAL
Qty: 90 TABLET | Refills: 0 | Status: SHIPPED | OUTPATIENT
Start: 2020-01-07 | End: 2020-03-25

## 2020-01-07 NOTE — TELEPHONE ENCOUNTER
Last visit: 12/17/19  Last Med refill: 6/28/19  Does patient have enough medication for 72 hours: NA    Next Visit Date:  Future Appointments   Date Time Provider Toni Warren   1/9/2020  1:00 PM [facility]   3/17/2020  9:30 AM Yocasta Ambrosio  [address] Maintenance   Topic Date Due    Annual Wellness Visit (AWV)  05/29/2019    Breast cancer screen  07/07/2019    Lipid screen  01/14/2020    Diabetic foot exam  04/18/2020 (Originally 7/31/2018)    Shingles Vaccine (1 of 2) 04/18/2020 (Originally 12/3/1995)    Pneumococcal 65+ years Vaccine (1 of 1 - PPSV23) 04/23/2020 (Originally 12/3/2010)    Diabetic retinal exam  04/27/2020 (Originally [birth year]3/1955)    DTaP/Tdap/Td vaccine (1 - Tdap) 05/29/2020 (Originally 12/3/1956)    Flu vaccine (1) 09/24/2020 (Originally 9/1/2019)    A1C test (Diabetic or Prediabetic)  09/24/2020    Diabetic microalbuminuria test  09/24/2020    Potassium monitoring  09/24/2020    Creatinine monitoring  09/24/2020    Low dose CT lung screening  12/06/2020    Colon cancer screen colonoscopy  10/19/2026    DEXA (modify frequency per FRAX score)  Completed    Hepatitis C screen  Completed       Hemoglobin A1C (%)   Date Value   09/24/2019 7.6 (H)   04/18/2019 6.2 (H)   01/14/2019 7.8 (H)             ( goal A1C is < 7)   Microalb/Crt.  Ratio (mcg/mg creat)   Date Value   09/24/2019 1,617 (H)     LDL Cholesterol (mg/dL)   Date Value   01/14/2019 32   08/02/2016 38     LDL Calculated (mg/dL)   Date Value   01/08/2014 83       (goal LDL is <100)   AST (U/L)   Date Value   09/24/2019 14     ALT (U/L)   Date Value   09/24/2019 7     BUN (mg/dL)   Date Value   09/24/2019 11     BP Readings from Last 3 Encounters:   12/17/19 (!) 140/80   09/24/19 (!) 140/80   04/18/19 130/70          (goal 120/80)    All Future Testing planned in [facility]  Lab Frequency Next Occurrence   ANGELI DIGITAL SCREEN W OR WO CAD BILATERAL Once 01/14/2019 Patient Active Problem List:     GERD (gastroesophageal reflux disease)     Hypertension     Vertigo     Iron deficiency anemia due to chronic blood loss     Other hyperlipidemia     Chronic obstructive pulmonary disease (HCC)     Influenza vaccination declined     Pneumococcal vaccination declined     Smoker     [facility]     Edema of both lower extremities due to peripheral venous insufficiency     Chronic respiratory failure with hypoxia (HCC)     Oxygen dependent     Systolic congestive heart failure (HCC)     Warfarin-induced coagulopathy (HCC)     Chronic atrial fibrillation

## 2020-01-07 NOTE — TELEPHONE ENCOUNTER
elevated bp 180/90 dropped to 160/78   She saying her eyes are itching and are blood shot red  Patient has new patient appointment in the morning with new cardiologist at [facility]

## 2020-01-09 ENCOUNTER — HOSPITAL ENCOUNTER (OUTPATIENT)
Dept: PHARMACY | Age: 75
Setting detail: THERAPIES SERIES
Discharge: HOME | End: 2020-01-09
Payer: MEDICARE

## 2020-01-09 ENCOUNTER — APPOINTMENT (OUTPATIENT)
Dept: PHARMACY | Age: 75
End: 2020-01-09
Payer: MEDICARE

## 2020-01-09 LAB
INR BLD: 1.9
PROTIME: 23.4 SECONDS

## 2020-01-09 PROCEDURE — 99211 OFF/OP EST MAY X REQ PHY/QHP: CPT

## 2020-01-09 PROCEDURE — 85610 PROTHROMBIN TIME: CPT

## 2020-01-13 RX ORDER — FUROSEMIDE 40 MG/1
TABLET ORAL
Qty: 90 TABLET | Refills: 0 | Status: SHIPPED | OUTPATIENT
Start: 2020-01-13 | End: 2020-04-07

## 2020-01-13 NOTE — TELEPHONE ENCOUNTER
Filled during recently hospital admission. Daughter called for refill. Last seen 12/17/19    Next Visit Date:  Future Appointments   Date Time Provider Toni Wraren   1/16/2020  1:00 PM [facility]   3/17/2020  9:30 AM Rito Oliver  [facility] [address] Maintenance   Topic Date Due    Annual Wellness Visit (AWV)  05/29/2019    Breast cancer screen  07/07/2019    Lipid screen  01/14/2020    Diabetic foot exam  04/18/2020 (Originally 7/31/2018)    Shingles Vaccine (1 of 2) 04/18/2020 (Originally 12/3/1995)    Pneumococcal 65+ years Vaccine (1 of 1 - PPSV23) 04/23/2020 (Originally 12/3/2010)    Diabetic retinal exam  04/27/2020 (Originally 12/3/1955)    DTaP/Tdap/Td vaccine (1 - Tdap) 05/29/2020 (Originally 12/3/1956)    Flu vaccine (1) 09/24/2020 (Originally 9/1/2019)    A1C test (Diabetic or Prediabetic)  09/24/2020    Diabetic microalbuminuria test  09/24/2020    Potassium monitoring  09/24/2020    Creatinine monitoring  09/24/2020    Low dose CT lung screening  12/06/2020    Colon cancer screen colonoscopy  10/19/2026    DEXA (modify frequency per FRAX score)  Completed    Hepatitis C screen  Completed       Hemoglobin A1C (%)   Date Value   09/24/2019 7.6 (H)   04/18/2019 6.2 (H)   01/14/2019 7.8 (H)             ( goal A1C is < 7)   Microalb/Crt.  Ratio (mcg/mg creat)   Date Value   09/24/2019 1,617 (H)     LDL Cholesterol (mg/dL)   Date Value   01/14/2019 32   08/02/2016 38     LDL Calculated (mg/dL)   Date Value   01/08/2014 83       (goal LDL is <100)   AST (U/L)   Date Value   09/24/2019 14     ALT (U/L)   Date Value   09/24/2019 7     BUN (mg/dL)   Date Value   09/24/2019 11     BP Readings from Last 3 Encounters:   12/17/19 (!) 140/80   09/24/19 (!) 140/80   04/18/19 130/70          (goal 120/80)    All Future Testing planned in [facility]  Lab Frequency Next Occurrence   ANGELI DIGITAL SCREEN W OR WO CAD BILATERAL Once 01/14/2019

## 2020-01-15 RX ORDER — WARFARIN SODIUM 5 MG/1
TABLET ORAL
Qty: 90 TABLET | Refills: 0 | Status: SHIPPED | OUTPATIENT
Start: 2020-01-15 | End: 2020-04-02

## 2020-01-16 ENCOUNTER — HOSPITAL ENCOUNTER (OUTPATIENT)
Dept: PHARMACY | Age: 75
Setting detail: THERAPIES SERIES
Discharge: HOME | End: 2020-01-16
Payer: MEDICARE

## 2020-01-16 LAB
INR BLD: 1.4
PROTIME: 16.7 SECONDS

## 2020-01-16 PROCEDURE — 99212 OFFICE O/P EST SF 10 MIN: CPT

## 2020-01-16 PROCEDURE — 85610 PROTHROMBIN TIME: CPT

## 2020-01-16 NOTE — PROGRESS NOTES
Patient states compliant all of the time with regimen. No bleeding or thromboembolic side effects noted. No significant med or dietary changes. No significant recent illness or disease state changes. PT/INR done in office per protocol. INR is 1.4 which is subtherapeutic - still in adjustment phase    Warfarin regimen will be continued with 10mg x 1, 7.5mg x 1, then 5mg daily. Will retest in 1 week to assess. Patient understands dosing directions and information discussed. Dosing schedule and follow up appointment given to patient. Progress note routed to referring physicians office. Patient acknowledges working in consult agreement with pharmacist as referred by his/her physician.       Mariano Keeen, 1/16/2020 12:48 PM

## 2020-01-23 ENCOUNTER — HOSPITAL ENCOUNTER (OUTPATIENT)
Dept: PHARMACY | Age: 75
Setting detail: THERAPIES SERIES
Discharge: HOME | End: 2020-01-23
Payer: MEDICARE

## 2020-01-23 LAB
INR BLD: 1.7
PROTIME: 20.3 SECONDS

## 2020-01-23 PROCEDURE — 85610 PROTHROMBIN TIME: CPT

## 2020-01-23 PROCEDURE — 99212 OFFICE O/P EST SF 10 MIN: CPT

## 2020-01-30 ENCOUNTER — HOSPITAL ENCOUNTER (OUTPATIENT)
Dept: PHARMACY | Age: 75
Setting detail: THERAPIES SERIES
Discharge: HOME | End: 2020-01-30
Payer: MEDICARE

## 2020-01-30 LAB
INR BLD: 2.1
PROTIME: 24.7 SECONDS

## 2020-01-30 PROCEDURE — 99211 OFF/OP EST MAY X REQ PHY/QHP: CPT

## 2020-01-30 PROCEDURE — 85610 PROTHROMBIN TIME: CPT

## 2020-02-13 ENCOUNTER — HOSPITAL ENCOUNTER (OUTPATIENT)
Dept: PHARMACY | Age: 75
Setting detail: THERAPIES SERIES
Discharge: HOME | End: 2020-02-13
Payer: MEDICARE

## 2020-02-13 LAB
INR BLD: 2.5
PROTIME: 30.2 SECONDS

## 2020-02-13 PROCEDURE — 99211 OFF/OP EST MAY X REQ PHY/QHP: CPT

## 2020-02-13 PROCEDURE — 85610 PROTHROMBIN TIME: CPT

## 2020-02-13 NOTE — PROGRESS NOTES
Patient states compliant all of the time with regimen. No bleeding or thromboembolic side effects noted. No significant med or dietary changes. No significant recent illness or disease state changes. PT/INR done in office per protocol. INR is 2.5 which is therapeutic. Warfarin regimen will be continued at current dose 10 mg Sun/Thurs and 5 my AOD. Will retest in 3 weeks. Patient understands dosing directions and information discussed. Dosing schedule and follow up appointment given to patient. Progress note routed to referring physicians office. Patient acknowledges working in consult agreement with pharmacist as referred by his/her physician.       Brittany Rahman, 2/13/2020 1:08 PM

## 2020-03-02 RX ORDER — AMLODIPINE BESYLATE 10 MG/1
TABLET ORAL
Qty: 30 TABLET | Refills: 0 | Status: SHIPPED | OUTPATIENT
Start: 2020-03-02 | End: 2020-03-30

## 2020-03-04 ENCOUNTER — HOSPITAL ENCOUNTER (OUTPATIENT)
Dept: PHARMACY | Age: 75
Setting detail: THERAPIES SERIES
Discharge: HOME | End: 2020-03-04
Payer: MEDICARE

## 2020-03-04 LAB
INR BLD: 2.9
PROTIME: 35.3 SECONDS

## 2020-03-04 PROCEDURE — 85610 PROTHROMBIN TIME: CPT

## 2020-03-04 PROCEDURE — 99211 OFF/OP EST MAY X REQ PHY/QHP: CPT

## 2020-03-04 NOTE — PROGRESS NOTES
Patient states compliant all of the time with regimen. No bleeding or thromboembolic side effects noted. No significant med or dietary changes. No significant recent illness or disease state changes. PT/INR done in office per protocol. INR is 2.9 which is therapeutic. Warfarin regimen will be continued at current dose 10mg Sun/Thurs and 5mg all other days. Will retest in 4 weeks. Patient understands dosing directions and information discussed. Dosing schedule and follow up appointment given to patient. Progress note routed to referring physicians office. Patient acknowledges working in consult agreement with pharmacist as referred by his/her physician.       Mark Padgett, 3/4/2020 5:23 PM

## 2020-03-26 RX ORDER — POTASSIUM CHLORIDE 20 MEQ/1
TABLET, EXTENDED RELEASE ORAL
Qty: 90 TABLET | Refills: 0 | Status: SHIPPED | OUTPATIENT
Start: 2020-03-26 | End: 2020-04-22

## 2020-03-30 NOTE — TELEPHONE ENCOUNTER
Last visit: 12/17/19  Last Med refill: 3/2/2020  Does patient have enough medication for 72 hours: NA    Next Visit Date:  Future Appointments   Date Time Provider Toni Warren   4/6/2020  9:45 AM [facility] [address] Maintenance   Topic Date Due    Annual Wellness Visit (AWV)  05/29/2019    Breast cancer screen  07/07/2019    Lipid screen  01/14/2020    Diabetic foot exam  04/18/2020 (Originally 7/31/2018)    Shingles Vaccine (1 of 2) 04/18/2020 (Originally 12/3/1995)    Pneumococcal 65+ years Vaccine (1 of 1 - PPSV23) 04/23/2020 (Originally 12/3/2010)    Diabetic retinal exam  04/27/2020 (Originally 12/3/1955)    DTaP/Tdap/Td vaccine (1 - Tdap) 05/29/2020 (Originally 12/3/1964)    Flu vaccine (1) 09/24/2020 (Originally 9/1/2019)    A1C test (Diabetic or Prediabetic)  09/24/2020    Diabetic microalbuminuria test  09/24/2020    Potassium monitoring  09/24/2020    Creatinine monitoring  09/24/2020    Low dose CT lung screening  12/06/2020    Colon cancer screen colonoscopy  10/19/2026    DEXA (modify frequency per FRAX score)  Completed    Hepatitis C screen  Completed    Hepatitis A vaccine  Aged Out    Hib vaccine  Aged Out    Meningococcal (ACWY) vaccine  Aged Out       Hemoglobin A1C (%)   Date Value   09/24/2019 7.6 (H)   04/18/2019 6.2 (H)   01/14/2019 7.8 (H)             ( goal A1C is < 7)   Microalb/Crt.  Ratio (mcg/mg creat)   Date Value   09/24/2019 1,617 (H)     LDL Cholesterol (mg/dL)   Date Value   01/14/2019 32   08/02/2016 38     LDL Calculated (mg/dL)   Date Value   01/08/2014 83       (goal LDL is <100)   AST (U/L)   Date Value   09/24/2019 14     ALT (U/L)   Date Value   09/24/2019 7     BUN (mg/dL)   Date Value   09/24/2019 11     BP Readings from Last 3 Encounters:   12/17/19 (!) 140/80   09/24/19 (!) 140/80   04/18/19 130/70          (goal 120/80)    All Future Testing planned in [facility]  Lab Frequency Next Occurrence Patient Active Problem List:     GERD (gastroesophageal reflux disease)     Hypertension     Vertigo     Iron deficiency anemia due to chronic blood loss     Other hyperlipidemia     Chronic obstructive pulmonary disease (HCC)     Influenza vaccination declined     Pneumococcal vaccination declined     Smoker     Uncontrolled type 1 diabetes mellitus with hyperglycemia ([facility]     Edema of both lower extremities due to peripheral venous insufficiency     Chronic respiratory failure with hypoxia (HCC)     Oxygen dependent     Systolic congestive heart failure (HCC)     Warfarin-induced coagulopathy (HCC)     Chronic atrial fibrillation

## 2020-03-31 RX ORDER — AMLODIPINE BESYLATE 10 MG/1
TABLET ORAL
Qty: 90 TABLET | Refills: 0 | Status: SHIPPED | OUTPATIENT
Start: 2020-03-31 | End: 2020-06-29

## 2020-04-02 ENCOUNTER — TELEPHONE (OUTPATIENT)
Dept: PHARMACY | Age: 75
End: 2020-04-02

## 2020-04-02 RX ORDER — WARFARIN SODIUM 5 MG/1
TABLET ORAL
Qty: 90 TABLET | Refills: 0 | Status: SHIPPED | OUTPATIENT
Start: 2020-04-02 | End: 2020-06-22

## 2020-04-06 ENCOUNTER — HOSPITAL ENCOUNTER (OUTPATIENT)
Dept: PHARMACY | Age: 75
Setting detail: THERAPIES SERIES
Discharge: HOME | End: 2020-04-06
Payer: MEDICARE

## 2020-04-06 LAB
INR BLD: 4.7
PROTIME: 56.8 SECONDS

## 2020-04-06 PROCEDURE — 99211 OFF/OP EST MAY X REQ PHY/QHP: CPT

## 2020-04-06 PROCEDURE — 85610 PROTHROMBIN TIME: CPT

## 2020-04-06 NOTE — PROGRESS NOTES
Spoke with patient via phone. Patient compliant all of the time with regimen. No bleeding or thromboembolic side effects noted. No significant med or dietary changes. No significant recent illness or disease state changes. PT/INR done in [facility] outpatient lab. INR is 4.7 which is supratherapeutic with no identified reason. Warfarin regimen will be held for today, 2.5mg x 1, then continue 10mg Thur/Sun and 5mg all other days. Will have next INR drawn in 1 week to assess. Patient given dosing directions and follow up appointment. Progress note routed to referring physicians office.     CLINICAL PHARMACY CONSULT: MED RECONCILIATION/REVIEW ADDENDUM    For Pharmacy Admin Tracking Only    PHSO: No  Total # of Interventions Recommended: 1  - Decreased Dose #: 1  - Maintenance Safety Lab Monitoring #: 1  Total Interventions Accepted: 1  Time Spent (min): [address] [facility] [address], [address] [facility]

## 2020-04-07 RX ORDER — METOPROLOL SUCCINATE 50 MG/1
TABLET, EXTENDED RELEASE ORAL
Qty: 90 TABLET | Refills: 0 | Status: SHIPPED | OUTPATIENT
Start: 2020-04-07 | End: 2020-06-17

## 2020-04-07 RX ORDER — FUROSEMIDE 40 MG/1
TABLET ORAL
Qty: 90 TABLET | Refills: 0 | Status: SHIPPED | OUTPATIENT
Start: 2020-04-07 | End: 2020-07-09

## 2020-04-08 RX ORDER — AMLODIPINE BESYLATE 10 MG/1
TABLET ORAL
Qty: 90 TABLET | Refills: 0 | OUTPATIENT
Start: 2020-04-08

## 2020-04-08 RX ORDER — FUROSEMIDE 40 MG/1
TABLET ORAL
Qty: 90 TABLET | Refills: 0 | OUTPATIENT
Start: 2020-04-08

## 2020-04-08 RX ORDER — WARFARIN SODIUM 5 MG/1
TABLET ORAL
Qty: 90 TABLET | Refills: 0 | OUTPATIENT
Start: 2020-04-08

## 2020-04-13 ENCOUNTER — HOSPITAL ENCOUNTER (OUTPATIENT)
Dept: PHARMACY | Age: 75
Setting detail: THERAPIES SERIES
Discharge: HOME | End: 2020-04-13
Payer: MEDICARE

## 2020-04-13 LAB
INR BLD: 2.6
PROTIME: 32.3 SECONDS

## 2020-04-13 PROCEDURE — 99211 OFF/OP EST MAY X REQ PHY/QHP: CPT

## 2020-04-13 PROCEDURE — 85610 PROTHROMBIN TIME: CPT

## 2020-04-22 RX ORDER — POTASSIUM CHLORIDE 20 MEQ/1
TABLET, EXTENDED RELEASE ORAL
Qty: 90 TABLET | Refills: 0 | Status: SHIPPED | OUTPATIENT
Start: 2020-04-22 | End: 2020-10-21

## 2020-04-22 NOTE — TELEPHONE ENCOUNTER
GERD (gastroesophageal reflux disease)     Hypertension     Vertigo     Iron deficiency anemia due to chronic blood loss     Other hyperlipidemia     Chronic obstructive pulmonary disease (HCC)     Influenza vaccination declined     Pneumococcal vaccination declined     Smoker     Uncontrolled type 1 diabetes mellitus with hyperglycemia ([facility]     Edema of both lower extremities due to peripheral venous insufficiency     Chronic respiratory failure with hypoxia (HCC)     Oxygen dependent     Systolic congestive heart failure (HCC)     Warfarin-induced coagulopathy (HCC)     Chronic atrial fibrillation

## 2020-04-27 ENCOUNTER — HOSPITAL ENCOUNTER (OUTPATIENT)
Dept: PHARMACY | Age: 75
Setting detail: THERAPIES SERIES
Discharge: HOME | End: 2020-04-27
Payer: MEDICARE

## 2020-04-27 LAB
INR BLD: 2.7
PROTIME: 32.3 SECONDS

## 2020-04-27 PROCEDURE — 85610 PROTHROMBIN TIME: CPT

## 2020-04-27 PROCEDURE — 99211 OFF/OP EST MAY X REQ PHY/QHP: CPT

## 2020-04-27 NOTE — PROGRESS NOTES
Spoke with patient via phone. Patient compliant all of the time with regimen. No bleeding or thromboembolic side effects noted. No significant med or dietary changes. No significant recent illness or disease state changes. PT/INR done in [facility] outpatient lab. INR is 2.7 which is therapeutic. Warfarin regimen will be continued at current dose of 10mg Thursday and 5mg all other days. Will have next INR drawn in 3 weeks. Patient given dosing directions and follow up appointment. Progress note routed to referring physicians office.     CLINICAL PHARMACY CONSULT: MED RECONCILIATION/REVIEW ADDENDUM    For Pharmacy Admin Tracking Only    PHSO: No  Total # of Interventions Recommended: 0  - Maintenance Safety Lab Monitoring #: 1  Total Interventions Accepted: 0  Time Spent (min): [address] PharmD

## 2020-05-18 ENCOUNTER — HOSPITAL ENCOUNTER (OUTPATIENT)
Dept: PHARMACY | Age: 75
Setting detail: THERAPIES SERIES
Discharge: HOME | End: 2020-05-18
Payer: MEDICARE

## 2020-05-18 LAB
INR BLD: 2.2
PROTIME: 26.4 SECONDS

## 2020-05-18 PROCEDURE — 85610 PROTHROMBIN TIME: CPT

## 2020-05-18 PROCEDURE — 99211 OFF/OP EST MAY X REQ PHY/QHP: CPT

## 2020-05-19 NOTE — PROGRESS NOTES
Spoke with patient via phone. Patient compliant all of the time with regimen. No bleeding or thromboembolic side effects noted. No significant med or dietary changes. No significant recent illness or disease state changes. PT/INR done in [facility] outpatient lab. INR is 2.2 which is therapeutic. Warfarin regimen will be continued at current dose of 10mg Thursday and 5mg all other days. Will have next INR drawn in 4 weeks. Patient given dosing directions and follow up appointment. Progress note routed to referring physicians office.     CLINICAL PHARMACY CONSULT: MED RECONCILIATION/REVIEW ADDENDUM    For Pharmacy Admin Tracking Only    PHSO: No  Total # of Interventions Recommended: 0  - Maintenance Safety Lab Monitoring #: 1  Total Interventions Accepted: 0  Time Spent (min): [address] PharmD

## 2020-06-08 RX ORDER — INSULIN GLARGINE 100 [IU]/ML
INJECTION, SOLUTION SUBCUTANEOUS
Qty: 5 PEN | Refills: 0 | OUTPATIENT
Start: 2020-06-08

## 2020-06-11 ENCOUNTER — HOSPITAL ENCOUNTER (OUTPATIENT)
Dept: PHARMACY | Age: 75
Setting detail: THERAPIES SERIES
Discharge: HOME | End: 2020-06-11
Payer: MEDICARE

## 2020-06-11 VITALS — TEMPERATURE: 97.5 F

## 2020-06-11 LAB
INR BLD: 2.6
PROTIME: 31.4 SECONDS

## 2020-06-11 PROCEDURE — 85610 PROTHROMBIN TIME: CPT

## 2020-06-11 PROCEDURE — 99211 OFF/OP EST MAY X REQ PHY/QHP: CPT

## 2020-06-11 RX ORDER — INSULIN GLARGINE 100 [IU]/ML
INJECTION, SOLUTION SUBCUTANEOUS
Qty: 5 PEN | Refills: 0 | Status: SHIPPED | OUTPATIENT
Start: 2020-06-11 | End: 2020-07-02

## 2020-06-17 RX ORDER — METOPROLOL SUCCINATE 50 MG/1
TABLET, EXTENDED RELEASE ORAL
Qty: 90 TABLET | Refills: 0 | Status: SHIPPED | OUTPATIENT
Start: 2020-06-17 | End: 2020-08-21 | Stop reason: SDUPTHER

## 2020-06-22 RX ORDER — WARFARIN SODIUM 5 MG/1
TABLET ORAL
Qty: 90 TABLET | Refills: 0 | Status: SHIPPED | OUTPATIENT
Start: 2020-06-22 | End: 2020-06-29

## 2020-06-22 NOTE — TELEPHONE ENCOUNTER
Last visit: 12/17/19 patient is out of medication    Next Visit Date:6/25/20  Future Appointments   Date Time Provider Toni Warren   6/25/2020  3:00 [facility]   7/9/2020  2:30 PM [facility] [address] Maintenance   Topic Date Due    Diabetic retinal exam  12/03/1955    DTaP/Tdap/Td vaccine (1 - Tdap) 12/03/1964    Shingles Vaccine (1 of 2) 12/03/1995    Pneumococcal 65+ years Vaccine (1 of 1 - PPSV23) 12/03/2010    Diabetic foot exam  07/31/2018    Annual Wellness Visit (AWV)  05/29/2019    Breast cancer screen  07/07/2019    Lipid screen  01/14/2020    Flu vaccine (Season Ended) 09/24/2020 (Originally 9/1/2020)    A1C test (Diabetic or Prediabetic)  09/24/2020    Diabetic microalbuminuria test  09/24/2020    Potassium monitoring  09/24/2020    Creatinine monitoring  09/24/2020    Low dose CT lung screening  12/06/2020    Colon cancer screen colonoscopy  10/19/2026    DEXA (modify frequency per FRAX score)  Completed    Hepatitis C screen  Completed    Hepatitis A vaccine  Aged Out    Hib vaccine  Aged Out    Meningococcal (ACWY) vaccine  Aged Out       Hemoglobin A1C (%)   Date Value   09/24/2019 7.6 (H)   04/18/2019 6.2 (H)   01/14/2019 7.8 (H)             ( goal A1C is < 7)   Microalb/Crt.  Ratio (mcg/mg creat)   Date Value   09/24/2019 1,617 (H)     LDL Cholesterol (mg/dL)   Date Value   01/14/2019 32   08/02/2016 38     LDL Calculated (mg/dL)   Date Value   01/08/2014 83       (goal LDL is <100)   AST (U/L)   Date Value   09/24/2019 14     ALT (U/L)   Date Value   09/24/2019 7     BUN (mg/dL)   Date Value   09/24/2019 11     BP Readings from Last 3 Encounters:   12/17/19 (!) 140/80   09/24/19 (!) 140/80   04/18/19 130/70          (goal 120/80)    All Future Testing planned in [facility]  Lab Frequency Next Occurrence               Patient Active Problem List:     GERD (gastroesophageal reflux disease)     Hypertension Vertigo     Iron deficiency anemia due to chronic blood loss     Other hyperlipidemia     Chronic obstructive pulmonary disease (HCC)     Influenza vaccination declined     Pneumococcal vaccination declined     Smoker     Uncontrolled type 1 diabetes mellitus with hyperglycemia ([facility]     Edema of both lower extremities due to peripheral venous insufficiency     Chronic respiratory failure with hypoxia (HCC)     Oxygen dependent     Systolic congestive heart failure (HCC)     Warfarin-induced coagulopathy (HCC)     Chronic atrial fibrillation

## 2020-06-25 ENCOUNTER — OFFICE VISIT (OUTPATIENT)
Dept: INTERNAL MEDICINE CLINIC | Age: 75
End: 2020-06-25
Payer: MEDICARE

## 2020-06-25 VITALS
HEIGHT: 67 IN | SYSTOLIC BLOOD PRESSURE: 128 MMHG | BODY MASS INDEX: 35.16 KG/M2 | WEIGHT: 224 LBS | RESPIRATION RATE: 17 BRPM | DIASTOLIC BLOOD PRESSURE: 70 MMHG | HEART RATE: 96 BPM | OXYGEN SATURATION: 94 % | TEMPERATURE: 97.2 F

## 2020-06-25 PROBLEM — J96.11 CHRONIC RESPIRATORY FAILURE WITH HYPOXIA (HCC): Status: RESOLVED | Noted: 2019-12-17 | Resolved: 2020-06-25

## 2020-06-25 PROCEDURE — G8926 SPIRO NO PERF OR DOC: HCPCS | Performed by: FAMILY MEDICINE

## 2020-06-25 PROCEDURE — G8417 CALC BMI ABV UP PARAM F/U: HCPCS | Performed by: FAMILY MEDICINE

## 2020-06-25 PROCEDURE — 3017F COLORECTAL CA SCREEN DOC REV: CPT | Performed by: FAMILY MEDICINE

## 2020-06-25 PROCEDURE — 3046F HEMOGLOBIN A1C LEVEL >9.0%: CPT | Performed by: FAMILY MEDICINE

## 2020-06-25 PROCEDURE — 1123F ACP DISCUSS/DSCN MKR DOCD: CPT | Performed by: FAMILY MEDICINE

## 2020-06-25 PROCEDURE — 2022F DILAT RTA XM EVC RTNOPTHY: CPT | Performed by: FAMILY MEDICINE

## 2020-06-25 PROCEDURE — 3023F SPIROM DOC REV: CPT | Performed by: FAMILY MEDICINE

## 2020-06-25 PROCEDURE — G8399 PT W/DXA RESULTS DOCUMENT: HCPCS | Performed by: FAMILY MEDICINE

## 2020-06-25 PROCEDURE — 4040F PNEUMOC VAC/ADMIN/RCVD: CPT | Performed by: FAMILY MEDICINE

## 2020-06-25 PROCEDURE — 99214 OFFICE O/P EST MOD 30 MIN: CPT | Performed by: FAMILY MEDICINE

## 2020-06-25 PROCEDURE — G8427 DOCREV CUR MEDS BY ELIG CLIN: HCPCS | Performed by: FAMILY MEDICINE

## 2020-06-25 PROCEDURE — 4004F PT TOBACCO SCREEN RCVD TLK: CPT | Performed by: FAMILY MEDICINE

## 2020-06-25 PROCEDURE — 1090F PRES/ABSN URINE INCON ASSESS: CPT | Performed by: FAMILY MEDICINE

## 2020-06-25 RX ORDER — PREDNISONE 10 MG/1
10 TABLET ORAL DAILY
Qty: 7 TABLET | Refills: 0 | Status: SHIPPED | OUTPATIENT
Start: 2020-06-25 | End: 2020-07-02

## 2020-06-25 ASSESSMENT — ENCOUNTER SYMPTOMS
ALLERGIC/IMMUNOLOGIC NEGATIVE: 1
GASTROINTESTINAL NEGATIVE: 1
EYES NEGATIVE: 1
COUGH: 1
BACK PAIN: 1
SHORTNESS OF BREATH: 1

## 2020-06-25 ASSESSMENT — COPD QUESTIONNAIRES: COPD: 1

## 2020-06-29 RX ORDER — WARFARIN SODIUM 5 MG/1
TABLET ORAL
Qty: 90 TABLET | Refills: 0 | Status: SHIPPED | OUTPATIENT
Start: 2020-06-29 | End: 2020-12-29

## 2020-06-29 RX ORDER — AMLODIPINE BESYLATE 10 MG/1
TABLET ORAL
Qty: 90 TABLET | Refills: 0 | Status: SHIPPED | OUTPATIENT
Start: 2020-06-29 | End: 2020-09-28

## 2020-06-29 NOTE — TELEPHONE ENCOUNTER
Last visit: 6/25/2020  Last Med refill: 3/31/2020  Does patient have enough medication for 72 hours: Yes    Next Visit Date:  Future Appointments   Date Time Provider Toni Warren   7/9/2020  2:30 PM [facility]   9/28/2020  3:30 PM Dolly Carranza MD [facility] [address] Maintenance   Topic Date Due    Diabetic retinal exam  12/03/1955    DTaP/Tdap/Td vaccine (1 - Tdap) 12/03/1964    Shingles Vaccine (1 of 2) 12/03/1995    Pneumococcal 65+ years Vaccine (1 of 1 - PPSV23) 12/03/2010    Diabetic foot exam  07/31/2018    Annual Wellness Visit (AWV)  05/29/2019    Breast cancer screen  07/07/2019    Lipid screen  01/14/2020    Flu vaccine (Season Ended) 09/24/2020 (Originally 9/1/2020)    A1C test (Diabetic or Prediabetic)  09/24/2020    Diabetic microalbuminuria test  09/24/2020    Potassium monitoring  09/24/2020    Creatinine monitoring  09/24/2020    Low dose CT lung screening  12/06/2020    Colon cancer screen colonoscopy  10/19/2026    DEXA (modify frequency per FRAX score)  Completed    Hepatitis C screen  Completed    Hepatitis A vaccine  Aged Out    Hib vaccine  Aged Out    Meningococcal (ACWY) vaccine  Aged Out       Hemoglobin A1C (%)   Date Value   09/24/2019 7.6 (H)   04/18/2019 6.2 (H)   01/14/2019 7.8 (H)             ( goal A1C is < 7)   Microalb/Crt.  Ratio (mcg/mg creat)   Date Value   09/24/2019 1,617 (H)     LDL Cholesterol (mg/dL)   Date Value   01/14/2019 32   08/02/2016 38     LDL Calculated (mg/dL)   Date Value   01/08/2014 83       (goal LDL is <100)   AST (U/L)   Date Value   09/24/2019 14     ALT (U/L)   Date Value   09/24/2019 7     BUN (mg/dL)   Date Value   09/24/2019 11     BP Readings from Last 3 Encounters:   06/25/20 128/70   12/17/19 (!) 140/80   09/24/19 (!) 140/80          (goal 120/80)    All Future Testing planned in [facility]  Lab Frequency Next Occurrence   CBC Once 06/25/2020   Comprehensive Metabolic Panel Once

## 2020-06-30 ENCOUNTER — TELEPHONE (OUTPATIENT)
Dept: FAMILY MEDICINE CLINIC | Age: 75
End: 2020-06-30

## 2020-07-02 RX ORDER — INSULIN GLARGINE 100 [IU]/ML
INJECTION, SOLUTION SUBCUTANEOUS
Qty: 5 PEN | Refills: 0 | Status: ON HOLD | OUTPATIENT
Start: 2020-07-02 | End: 2020-08-27 | Stop reason: SDUPTHER

## 2020-07-09 ENCOUNTER — HOSPITAL ENCOUNTER (OUTPATIENT)
Dept: PHARMACY | Age: 75
Setting detail: THERAPIES SERIES
Discharge: HOME | End: 2020-07-09
Payer: MEDICARE

## 2020-07-09 VITALS — TEMPERATURE: 97.2 F

## 2020-07-09 LAB
INR BLD: 3.2
PROTIME: 38.3 SECONDS

## 2020-07-09 PROCEDURE — 85610 PROTHROMBIN TIME: CPT

## 2020-07-09 PROCEDURE — 99211 OFF/OP EST MAY X REQ PHY/QHP: CPT

## 2020-07-09 RX ORDER — FUROSEMIDE 40 MG/1
TABLET ORAL
Qty: 90 TABLET | Refills: 0 | Status: SHIPPED | OUTPATIENT
Start: 2020-07-09 | End: 2020-12-01 | Stop reason: SDUPTHER

## 2020-07-09 NOTE — TELEPHONE ENCOUNTER
Last visit: 6/25/2020  Last Med refill: 4/7/2020  Does patient have enough medication for 72 hours: NA    Next Visit Date:  Future Appointments   Date Time Provider Toni Warren   7/9/2020  2:30 PM [facility]   7/31/2020 10:30 AM Fox Casey MD [id number]   9/28/2020  3:30 PM MD [facility] [address] Maintenance   Topic Date Due    Diabetic retinal exam  [birth year]03/1955    DTaP/Tdap/Td vaccine (1 - Tdap) 12/03/1964    Shingles Vaccine (1 of 2) 12/03/1995    Pneumococcal 65+ years Vaccine (1 of 1 - PPSV23) 12/03/2010    Diabetic foot exam  07/31/2018    Annual Wellness Visit (AWV)  05/29/2019    Breast cancer screen  07/07/2019    Lipid screen  01/14/2020    Flu vaccine (1) 09/01/2020    A1C test (Diabetic or Prediabetic)  09/24/2020    Diabetic microalbuminuria test  09/24/2020    Potassium monitoring  09/24/2020    Creatinine monitoring  09/24/2020    Low dose CT lung screening  12/06/2020    Colon cancer screen colonoscopy  10/19/2026    DEXA (modify frequency per FRAX score)  Completed    Hepatitis C screen  Completed    Hepatitis A vaccine  Aged Out    Hib vaccine  Aged Out    Meningococcal (ACWY) vaccine  Aged Out       Hemoglobin A1C (%)   Date Value   09/24/2019 7.6 (H)   04/18/2019 6.2 (H)   01/14/2019 7.8 (H)             ( goal A1C is < 7)   Microalb/Crt.  Ratio (mcg/mg creat)   Date Value   09/24/2019 1,617 (H)     LDL Cholesterol (mg/dL)   Date Value   01/14/2019 32   08/02/2016 38     LDL Calculated (mg/dL)   Date Value   01/08/2014 83       (goal LDL is <100)   AST (U/L)   Date Value   09/24/2019 14     ALT (U/L)   Date Value   09/24/2019 7     BUN (mg/dL)   Date Value   09/24/2019 11     BP Readings from Last 3 Encounters:   06/25/20 128/70   12/17/19 (!) 140/80   09/24/19 (!) 140/80          (goal 120/80)    All Future Testing planned in [facility]  Lab Frequency Next Occurrence   CBC Once 06/25/2020

## 2020-07-31 ENCOUNTER — HOSPITAL ENCOUNTER (OUTPATIENT)
Age: 75
Setting detail: SPECIMEN
Discharge: HOME | End: 2020-07-31
Payer: MEDICARE

## 2020-07-31 ENCOUNTER — OFFICE VISIT (OUTPATIENT)
Dept: INTERNAL MEDICINE CLINIC | Age: 75
End: 2020-07-31
Payer: MEDICARE

## 2020-07-31 VITALS
OXYGEN SATURATION: 94 % | BODY MASS INDEX: 35.47 KG/M2 | DIASTOLIC BLOOD PRESSURE: 79 MMHG | WEIGHT: 226 LBS | HEIGHT: 67 IN | HEART RATE: 107 BPM | SYSTOLIC BLOOD PRESSURE: 173 MMHG

## 2020-07-31 LAB
ALBUMIN SERPL-MCNC: 3.3 G/DL (ref 3.5–5.2)
ALBUMIN/GLOBULIN RATIO: 0.7 (ref 1–2.5)
ALP BLD-CCNC: 108 U/L (ref 35–104)
ALT SERPL-CCNC: 9 U/L (ref 5–33)
ANION GAP SERPL CALCULATED.3IONS-SCNC: 18 MMOL/L (ref 9–17)
AST SERPL-CCNC: 15 U/L
BILIRUB SERPL-MCNC: 0.31 MG/DL (ref 0.3–1.2)
BUN BLDV-MCNC: 11 MG/DL (ref 8–23)
BUN/CREAT BLD: ABNORMAL (ref 9–20)
CALCIUM SERPL-MCNC: 9 MG/DL (ref 8.6–10.4)
CHLORIDE BLD-SCNC: 104 MMOL/L (ref 98–107)
CHOLESTEROL/HDL RATIO: 3.1
CHOLESTEROL: 154 MG/DL
CO2: 21 MMOL/L (ref 20–31)
CREAT SERPL-MCNC: 0.86 MG/DL (ref 0.5–0.9)
CREATININE URINE: 16.6 MG/DL (ref 28–217)
GFR AFRICAN AMERICAN: >60 ML/MIN
GFR NON-AFRICAN AMERICAN: >60 ML/MIN
GFR SERPL CREATININE-BSD FRML MDRD: ABNORMAL ML/MIN/{1.73_M2}
GFR SERPL CREATININE-BSD FRML MDRD: ABNORMAL ML/MIN/{1.73_M2}
GLUCOSE BLD-MCNC: 168 MG/DL (ref 70–99)
HCT VFR BLD CALC: 41.5 % (ref 36.3–47.1)
HDLC SERPL-MCNC: 50 MG/DL
HEMOGLOBIN: 12 G/DL (ref 11.9–15.1)
LDL CHOLESTEROL: 72 MG/DL (ref 0–130)
MCH RBC QN AUTO: 24.2 PG (ref 25.2–33.5)
MCHC RBC AUTO-ENTMCNC: 28.9 G/DL (ref 28.4–34.8)
MCV RBC AUTO: 83.7 FL (ref 82.6–102.9)
MICROALBUMIN/CREAT 24H UR: 358 MG/L
MICROALBUMIN/CREAT UR-RTO: 2157 MCG/MG CREAT
NRBC AUTOMATED: 0 PER 100 WBC
PDW BLD-RTO: 19.9 % (ref 11.8–14.4)
PLATELET # BLD: 443 K/UL (ref 138–453)
PMV BLD AUTO: 10.8 FL (ref 8.1–13.5)
POTASSIUM SERPL-SCNC: 3.1 MMOL/L (ref 3.7–5.3)
RBC # BLD: 4.96 M/UL (ref 3.95–5.11)
SODIUM BLD-SCNC: 143 MMOL/L (ref 135–144)
TOTAL PROTEIN: 7.9 G/DL (ref 6.4–8.3)
TRIGL SERPL-MCNC: 159 MG/DL
TSH SERPL DL<=0.05 MIU/L-ACNC: 1.02 MIU/L (ref 0.3–5)
VLDLC SERPL CALC-MCNC: ABNORMAL MG/DL (ref 1–30)
WBC # BLD: 15 K/UL (ref 3.5–11.3)

## 2020-07-31 PROCEDURE — G0438 PPPS, INITIAL VISIT: HCPCS | Performed by: FAMILY MEDICINE

## 2020-07-31 PROCEDURE — 3046F HEMOGLOBIN A1C LEVEL >9.0%: CPT | Performed by: FAMILY MEDICINE

## 2020-07-31 PROCEDURE — 1123F ACP DISCUSS/DSCN MKR DOCD: CPT | Performed by: FAMILY MEDICINE

## 2020-07-31 PROCEDURE — 4040F PNEUMOC VAC/ADMIN/RCVD: CPT | Performed by: FAMILY MEDICINE

## 2020-07-31 PROCEDURE — 3017F COLORECTAL CA SCREEN DOC REV: CPT | Performed by: FAMILY MEDICINE

## 2020-07-31 RX ORDER — AMMONIUM LACTATE 12 G/100G
LOTION TOPICAL
Qty: 1 BOTTLE | Refills: 2 | Status: ON HOLD | OUTPATIENT
Start: 2020-07-31 | End: 2020-08-24

## 2020-07-31 ASSESSMENT — PATIENT HEALTH QUESTIONNAIRE - PHQ9
SUM OF ALL RESPONSES TO PHQ QUESTIONS 1-9: 0
SUM OF ALL RESPONSES TO PHQ QUESTIONS 1-9: 0

## 2020-07-31 ASSESSMENT — LIFESTYLE VARIABLES: HOW OFTEN DO YOU HAVE A DRINK CONTAINING ALCOHOL: 0

## 2020-07-31 ASSESSMENT — ENCOUNTER SYMPTOMS
GASTROINTESTINAL NEGATIVE: 1
RESPIRATORY NEGATIVE: 1
EYES NEGATIVE: 1
BACK PAIN: 1
ALLERGIC/IMMUNOLOGIC NEGATIVE: 1

## 2020-07-31 NOTE — PROGRESS NOTES
Subjective:      Patient ID: Alba Diop is a 76 y.o. female. Diabetes   She presents for her follow-up diabetic visit. She has type 1 diabetes mellitus. Her disease course has been fluctuating. Hypoglycemia symptoms include nervousness/anxiousness. There are no diabetic associated symptoms. There are no hypoglycemic complications. Symptoms are stable. Diabetic complications include heart disease. Risk factors for coronary artery disease include diabetes mellitus, dyslipidemia, obesity, hypertension, post-menopausal, sedentary lifestyle, stress and tobacco exposure. Current diabetic treatment includes insulin injections. She is compliant with treatment most of the time. Her weight is fluctuating minimally. She is following a generally healthy diet. Meal planning includes ADA exchanges, avoidance of concentrated sweets, calorie counting and carbohydrate counting. She has had a previous visit with a dietitian. She participates in exercise three times a week. Home blood sugar record trend: She did not bring Accu-Chek log. An ACE inhibitor/angiotensin II receptor blocker is being taken. Review of Systems   Constitutional: Negative. HENT: Negative. Eyes: Negative. Respiratory: Negative. Cardiovascular: Negative. Gastrointestinal: Negative. Endocrine: Negative. Musculoskeletal: Positive for arthralgias and back pain. Skin: Negative. Allergic/Immunologic: Negative. Neurological: Negative. Hematological: Negative. Psychiatric/Behavioral: The patient is nervous/anxious. Past family and social history unremarkable. Diagnosis Orders   1. Encounter for Medicare annual wellness exam     2. Gastroesophageal reflux disease without esophagitis     3. Essential hypertension     4. Vertigo     5. Iron deficiency anemia due to chronic blood loss     6. Other hyperlipidemia     7. Chronic obstructive pulmonary disease, unspecified COPD type ([facility] [address]     8.  Influenza vaccination declined 9. Pneumococcal vaccination declined     10. Smoker     11. Uncontrolled type 1 diabetes mellitus with hyperglycemia ([address]     12. Edema of both lower extremities due to peripheral venous insufficiency     13. Oxygen dependent     14. Systolic congestive heart failure, unspecified HF chronicity ([address]     15. Warfarin-induced coagulopathy ([address]     16. Chronic atrial fibrillation     17. Routine general medical examination at a health care facility           Objective:   Physical Exam  Vitals signs and nursing note reviewed. Constitutional:       Appearance: She is well-developed. HENT:      Head: Normocephalic and atraumatic. Right Ear: External ear normal.      Left Ear: External ear normal.   Eyes:      Conjunctiva/sclera: Conjunctivae normal.      Pupils: Pupils are equal, round, and reactive to light. Neck:      Musculoskeletal: Normal range of motion and neck supple. Cardiovascular:      Rate and Rhythm: Normal rate and regular rhythm. Heart sounds: Normal heart sounds. Comments: Hypertension, hyperlipidemia  Chronic A. fib. Rate controlled on Coumadin  Congestive heart failure  Pulmonary:      Effort: Pulmonary effort is normal.      Breath sounds: Normal breath sounds. Comments: COPD-active smoker  Abdominal:      General: Bowel sounds are normal.      Palpations: Abdomen is soft. Comments: GERD   Genitourinary:     Vagina: Normal.   Musculoskeletal: Normal range of motion. Comments: Degenerative polyarthralgia   Skin:     General: Skin is warm and dry. Comments: Xerosis cutis   Neurological:      Mental Status: She is alert and oriented to person, place, and time. Deep Tendon Reflexes: Reflexes are normal and symmetric. Psychiatric:      Comments: Anxiety         Assessment:       Diagnosis Orders   1. Encounter for Medicare annual wellness exam     2. Gastroesophageal reflux disease without esophagitis     3. Essential hypertension     4. Vertigo     5. Iron deficiency anemia due to chronic blood loss     6. Other hyperlipidemia     7. Chronic obstructive pulmonary disease, unspecified COPD type ([facility] [address]     8. Influenza vaccination declined     9. Pneumococcal vaccination declined     10. Smoker     11. Uncontrolled type 1 diabetes mellitus with hyperglycemia ([facility] [address]     12. Edema of both lower extremities due to peripheral venous insufficiency     13. Oxygen dependent     14. Systolic congestive heart failure, unspecified HF chronicity ([facility] [address]     15. Warfarin-induced coagulopathy ([facility] [address]     16. Chronic atrial fibrillation     17. Routine general medical examination at a health care facility             Plan:      60-year-old -American female with multiple comorbidities is presented for Medicare annual wellness check. She denies any distress, afebrile hemodynamically stable  Insulin-dependent diabetes mellitus. She is established however noncompliant with endocrinology follow-up. A1c is 7.6. She was supposed to have her fasting labs done through her last visit that she did not comply with. Compliance is advised. She is advised to keep Accu-Chek log for office review, compliant with current regimen of medication, adhere to ADA 1800 diet, daily moderate exercise  Hypertension well-controlled with random blood pressure equal less than 130/80. She is tolerating beta-blocker and ARB well  Hyperlipidemia statin that she is tolerating well  Monofilament testing is unremarkable. She is counseled on diabetic foot care  She is advised to update her annual dilated eye exam  Congestive heart failure. She is diuresing well. She is established however noncompliant with follow-up. Compliance is advised  Chronic A. fib. Rate controlled Coumadin titrated by Coumadin clinic. She is counseled on Coumadin compatible diet  COPD. She continues to smoke despite advice and does not express any desire to quit anytime soon.   Once again she is counseled, quit date, alternative to consider. Continue Spiriva, beta agonist and inhaled corticosteroid. Continue home O2 at night  GERD stable on proton pump inhibitor  Obesity. She will benefit from weight reduction  Xerosis cutis. I will order Lac-Hydrin  She appears anxious, however, denies being depressed  She lives with her  and 3 children living in [place] with close contact  Safety counseling including but not limited to carbon monoxide/[place], ambulating, avoiding loose clutter and staying in familiar environment  Further recommendations to follow lab  She is advised to call for any concern  She is advised to update her influenza, pneumococcal vaccine, Tdap and shingles vaccination at her pharmacy  She wished to hold off mammography for now  This note is created with a voice recognition program and while intend to generate a document that accurately reflects the content of the visit, no guarantee can be provided that every mistake has been identified and corrected by editing.             Carol Onofre MD

## 2020-07-31 NOTE — PATIENT INSTRUCTIONS
Personalized Preventive Plan for Alba Diop - 7/31/2020  Medicare offers a range of preventive health benefits. Some of the tests and screenings are paid in full while other may be subject to a deductible, co-insurance, and/or copay. Some of these benefits include a comprehensive review of your medical history including lifestyle, illnesses that may run in your family, and various assessments and screenings as appropriate. After reviewing your medical record and screening and assessments performed today your provider may have ordered immunizations, labs, imaging, and/or referrals for you. A list of these orders (if applicable) as well as your Preventive Care list are included within your After Visit Summary for your review. Other Preventive Recommendations:    · A preventive eye exam performed by an eye specialist is recommended every 1-2 years to screen for glaucoma; cataracts, macular degeneration, and other eye disorders. · A preventive dental visit is recommended every 6 months. · Try to get at least 150 minutes of exercise per week or 10,000 steps per day on a pedometer . · Order or download the FREE \"Exercise & Physical Activity: Your Everyday Guide\" from [employer]. Call 0-327.693.9342 or search [employer] online. · You need 8062-8245 mg of calcium and 6676-3968 IU of vitamin D per day. It is possible to meet your calcium requirement with diet alone, but a vitamin D supplement is usually necessary to meet this goal.  · When exposed to the sun, use a sunscreen that protects against both UVA and UVB radiation with an SPF of 30 or greater. Reapply every 2 to 3 hours or after sweating, drying off with a towel, or swimming. · Always wear a seat belt when traveling in a car. Always wear a helmet when riding a bicycle or motorcycle.

## 2020-08-02 LAB
ESTIMATED AVERAGE GLUCOSE: 174 MG/DL
HBA1C MFR BLD: 7.7 % (ref 4–6)

## 2020-08-06 ENCOUNTER — HOSPITAL ENCOUNTER (OUTPATIENT)
Dept: PHARMACY | Age: 75
Setting detail: THERAPIES SERIES
Discharge: HOME | End: 2020-08-06
Payer: MEDICARE

## 2020-08-06 VITALS — TEMPERATURE: 98 F

## 2020-08-06 LAB
INR BLD: 2.4
PROTIME: 29.1 SECONDS

## 2020-08-06 PROCEDURE — 99211 OFF/OP EST MAY X REQ PHY/QHP: CPT

## 2020-08-06 PROCEDURE — 85610 PROTHROMBIN TIME: CPT

## 2020-08-10 RX ORDER — METFORMIN HYDROCHLORIDE 500 MG/1
TABLET, EXTENDED RELEASE ORAL
Qty: 180 TABLET | Refills: 0 | Status: SHIPPED | OUTPATIENT
Start: 2020-08-10 | End: 2020-11-03

## 2020-08-10 NOTE — TELEPHONE ENCOUNTER
Last seen 7/31/2020  Last filled 10/10/19 #180 with 1 RF    Next Visit Date:  Future Appointments   Date Time Provider Toni Warren   9/3/2020  2:45 PM [facility] ROB Peterson   9/28/2020  3:30 PM Retta Brunner, MD [facility] [address] Maintenance   Topic Date Due    Diabetic retinal exam  12/03/1955    Diabetic foot exam  07/31/2018    Breast cancer screen  07/07/2019    Pneumococcal 65+ years Vaccine (1 of 1 - PPSV23) 09/18/2020 (Originally 12/3/2010)    DTaP/Tdap/Td vaccine (1 - Tdap) 07/31/2021 (Originally 12/3/1964)    Shingles Vaccine (1 of 2) 07/31/2021 (Originally 12/3/1995)    Flu vaccine (1) 09/01/2020    Low dose CT lung screening  12/06/2020    A1C test (Diabetic or Prediabetic)  07/31/2021    Diabetic microalbuminuria test  07/31/2021    Lipid screen  07/31/2021    Potassium monitoring  07/31/2021    Creatinine monitoring  07/31/2021    Annual Wellness Visit (AWV)  08/01/2021    Colon cancer screen colonoscopy  10/19/2026    DEXA (modify frequency per FRAX score)  Completed    Hepatitis C screen  Completed    Hepatitis A vaccine  Aged Out    Hib vaccine  Aged Out    Meningococcal (ACWY) vaccine  Aged Out       Hemoglobin A1C (%)   Date Value   07/31/2020 7.7 (H)   09/24/2019 7.6 (H)   04/18/2019 6.2 (H)             ( goal A1C is < 7)   Microalb/Crt.  Ratio (mcg/mg creat)   Date Value   07/31/2020 2,157 (H)     LDL Cholesterol (mg/dL)   Date Value   07/31/2020 72   01/14/2019 32     LDL Calculated (mg/dL)   Date Value   01/08/2014 83       (goal LDL is <100)   AST (U/L)   Date Value   07/31/2020 15     ALT (U/L)   Date Value   07/31/2020 9     BUN (mg/dL)   Date Value   07/31/2020 11     BP Readings from Last 3 Encounters:   07/31/20 (!) 173/79   06/25/20 128/70   12/17/19 (!) 140/80          (goal 120/80)    All Future Testing planned in [facility]  Lab Frequency Next Occurrence               Patient Active Problem List:     GERD (gastroesophageal reflux disease)     Hypertension     Vertigo     Iron deficiency anemia due to chronic blood loss     Other hyperlipidemia     Chronic obstructive pulmonary disease (HCC)     Influenza vaccination declined     Pneumococcal vaccination declined     Smoker     Uncontrolled type 1 diabetes mellitus with hyperglycemia ([facility] [address]     Edema of both lower extremities due to peripheral venous insufficiency     Oxygen dependent     Systolic congestive heart failure ([facility] [address]     Warfarin-induced coagulopathy (HCC)     Chronic atrial fibrillation

## 2020-08-21 RX ORDER — METOPROLOL SUCCINATE 50 MG/1
50 TABLET, EXTENDED RELEASE ORAL 2 TIMES DAILY
Qty: 60 TABLET | Refills: 1 | Status: ON HOLD | OUTPATIENT
Start: 2020-08-21 | End: 2020-08-24

## 2020-08-23 ENCOUNTER — HOSPITAL ENCOUNTER (INPATIENT)
Age: 75
LOS: 4 days | Discharge: OTHER INSTITUTION | DRG: 261 | End: 2020-08-27
Attending: EMERGENCY MEDICINE | Admitting: FAMILY MEDICINE
Payer: MEDICARE

## 2020-08-23 ENCOUNTER — APPOINTMENT (OUTPATIENT)
Dept: GENERAL RADIOLOGY | Age: 75
DRG: 261 | End: 2020-08-23
Payer: MEDICARE

## 2020-08-23 PROBLEM — R55 SYNCOPE AND COLLAPSE: Status: ACTIVE | Noted: 2020-08-23

## 2020-08-23 LAB
ABSOLUTE EOS #: 0.09 K/UL (ref 0–0.44)
ABSOLUTE IMMATURE GRANULOCYTE: 0.13 K/UL (ref 0–0.3)
ABSOLUTE LYMPH #: 2.59 K/UL (ref 1.1–3.7)
ABSOLUTE MONO #: 0.79 K/UL (ref 0.1–1.2)
ALBUMIN SERPL-MCNC: 3 G/DL (ref 3.5–5.2)
ALBUMIN/GLOBULIN RATIO: ABNORMAL (ref 1–2.5)
ALP BLD-CCNC: 98 U/L (ref 35–104)
ALT SERPL-CCNC: 13 U/L (ref 5–33)
ANION GAP SERPL CALCULATED.3IONS-SCNC: 13 MMOL/L (ref 9–17)
AST SERPL-CCNC: 28 U/L
BASOPHILS # BLD: 1 % (ref 0–2)
BASOPHILS ABSOLUTE: 0.09 K/UL (ref 0–0.2)
BILIRUB SERPL-MCNC: 0.35 MG/DL (ref 0.3–1.2)
BUN BLDV-MCNC: 8 MG/DL (ref 8–23)
BUN/CREAT BLD: 8 (ref 9–20)
C-REACTIVE PROTEIN: 39.5 MG/L (ref 0–5)
CALCIUM SERPL-MCNC: 8.6 MG/DL (ref 8.6–10.4)
CHLORIDE BLD-SCNC: 103 MMOL/L (ref 98–107)
CO2: 26 MMOL/L (ref 20–31)
CREAT SERPL-MCNC: 0.96 MG/DL (ref 0.5–0.9)
DIFFERENTIAL TYPE: ABNORMAL
EOSINOPHILS RELATIVE PERCENT: 1 % (ref 1–4)
FERRITIN: 75 UG/L (ref 13–150)
GFR AFRICAN AMERICAN: >60 ML/MIN
GFR NON-AFRICAN AMERICAN: 57 ML/MIN
GFR SERPL CREATININE-BSD FRML MDRD: ABNORMAL ML/MIN/{1.73_M2}
GFR SERPL CREATININE-BSD FRML MDRD: ABNORMAL ML/MIN/{1.73_M2}
GLUCOSE BLD-MCNC: 246 MG/DL (ref 65–105)
GLUCOSE BLD-MCNC: 68 MG/DL (ref 70–99)
HCT VFR BLD CALC: 36.6 % (ref 36.3–47.1)
HEMOGLOBIN: 10.6 G/DL (ref 11.9–15.1)
IMMATURE GRANULOCYTES: 1 %
INR BLD: 3.7
LACTATE DEHYDROGENASE: 360 U/L (ref 135–214)
LYMPHOCYTES # BLD: 17 % (ref 24–43)
MAGNESIUM: 1.5 MG/DL (ref 1.6–2.6)
MAGNESIUM: 1.5 MG/DL (ref 1.6–2.6)
MCH RBC QN AUTO: 23.5 PG (ref 25.2–33.5)
MCHC RBC AUTO-ENTMCNC: 29 G/DL (ref 28.4–34.8)
MCV RBC AUTO: 81.2 FL (ref 82.6–102.9)
MONOCYTES # BLD: 5 % (ref 3–12)
NRBC AUTOMATED: 0.6 PER 100 WBC
PARTIAL THROMBOPLASTIN TIME: 57.8 SEC (ref 23.9–33.8)
PDW BLD-RTO: 18.9 % (ref 11.8–14.4)
PLATELET # BLD: 391 K/UL (ref 138–453)
PLATELET ESTIMATE: ABNORMAL
PMV BLD AUTO: 9.7 FL (ref 8.1–13.5)
POTASSIUM SERPL-SCNC: 2.7 MMOL/L (ref 3.7–5.3)
POTASSIUM SERPL-SCNC: 3.2 MMOL/L (ref 3.7–5.3)
PROCALCITONIN: 0.06 NG/ML
PROTHROMBIN TIME: 36.5 SEC (ref 11.5–14.2)
RBC # BLD: 4.51 M/UL (ref 3.95–5.11)
RBC # BLD: ABNORMAL 10*6/UL
SEG NEUTROPHILS: 75 % (ref 36–65)
SEGMENTED NEUTROPHILS ABSOLUTE COUNT: 11.66 K/UL (ref 1.5–8.1)
SODIUM BLD-SCNC: 142 MMOL/L (ref 135–144)
TOTAL PROTEIN: 8.5 G/DL (ref 6.4–8.3)
TROPONIN INTERP: ABNORMAL
TROPONIN T: ABNORMAL NG/ML
TROPONIN, HIGH SENSITIVITY: 22 NG/L (ref 0–14)
TROPONIN, HIGH SENSITIVITY: 24 NG/L (ref 0–14)
TROPONIN, HIGH SENSITIVITY: 25 NG/L (ref 0–14)
WBC # BLD: 15.4 K/UL (ref 3.5–11.3)
WBC # BLD: ABNORMAL 10*3/UL

## 2020-08-23 PROCEDURE — 6360000002 HC RX W HCPCS: Performed by: EMERGENCY MEDICINE

## 2020-08-23 PROCEDURE — 87205 SMEAR GRAM STAIN: CPT

## 2020-08-23 PROCEDURE — 84484 ASSAY OF TROPONIN QUANT: CPT

## 2020-08-23 PROCEDURE — 93005 ELECTROCARDIOGRAM TRACING: CPT | Performed by: EMERGENCY MEDICINE

## 2020-08-23 PROCEDURE — 73610 X-RAY EXAM OF ANKLE: CPT

## 2020-08-23 PROCEDURE — 84132 ASSAY OF SERUM POTASSIUM: CPT

## 2020-08-23 PROCEDURE — 71045 X-RAY EXAM CHEST 1 VIEW: CPT

## 2020-08-23 PROCEDURE — 6370000000 HC RX 637 (ALT 250 FOR IP): Performed by: EMERGENCY MEDICINE

## 2020-08-23 PROCEDURE — 99285 EMERGENCY DEPT VISIT HI MDM: CPT

## 2020-08-23 PROCEDURE — 2580000003 HC RX 258: Performed by: EMERGENCY MEDICINE

## 2020-08-23 PROCEDURE — 96365 THER/PROPH/DIAG IV INF INIT: CPT

## 2020-08-23 PROCEDURE — 83735 ASSAY OF MAGNESIUM: CPT

## 2020-08-23 PROCEDURE — 86140 C-REACTIVE PROTEIN: CPT

## 2020-08-23 PROCEDURE — 87040 BLOOD CULTURE FOR BACTERIA: CPT

## 2020-08-23 PROCEDURE — 87150 DNA/RNA AMPLIFIED PROBE: CPT

## 2020-08-23 PROCEDURE — 85025 COMPLETE CBC W/AUTO DIFF WBC: CPT

## 2020-08-23 PROCEDURE — 85730 THROMBOPLASTIN TIME PARTIAL: CPT

## 2020-08-23 PROCEDURE — 80053 COMPREHEN METABOLIC PANEL: CPT

## 2020-08-23 PROCEDURE — 2060000000 HC ICU INTERMEDIATE R&B

## 2020-08-23 PROCEDURE — 82947 ASSAY GLUCOSE BLOOD QUANT: CPT

## 2020-08-23 PROCEDURE — U0003 INFECTIOUS AGENT DETECTION BY NUCLEIC ACID (DNA OR RNA); SEVERE ACUTE RESPIRATORY SYNDROME CORONAVIRUS 2 (SARS-COV-2) (CORONAVIRUS DISEASE [COVID-19]), AMPLIFIED PROBE TECHNIQUE, MAKING USE OF HIGH THROUGHPUT TECHNOLOGIES AS DESCRIBED BY CMS-2020-01-R: HCPCS

## 2020-08-23 PROCEDURE — 84145 PROCALCITONIN (PCT): CPT

## 2020-08-23 PROCEDURE — 6370000000 HC RX 637 (ALT 250 FOR IP): Performed by: FAMILY MEDICINE

## 2020-08-23 PROCEDURE — 83615 LACTATE (LD) (LDH) ENZYME: CPT

## 2020-08-23 PROCEDURE — 36415 COLL VENOUS BLD VENIPUNCTURE: CPT

## 2020-08-23 PROCEDURE — 85610 PROTHROMBIN TIME: CPT

## 2020-08-23 PROCEDURE — 82728 ASSAY OF FERRITIN: CPT

## 2020-08-23 RX ORDER — SODIUM CHLORIDE 0.9 % (FLUSH) 0.9 %
10 SYRINGE (ML) INJECTION PRN
Status: DISCONTINUED | OUTPATIENT
Start: 2020-08-23 | End: 2020-08-27 | Stop reason: HOSPADM

## 2020-08-23 RX ORDER — AZITHROMYCIN 250 MG/1
500 TABLET, FILM COATED ORAL DAILY
Status: DISCONTINUED | OUTPATIENT
Start: 2020-08-24 | End: 2020-08-25

## 2020-08-23 RX ORDER — METHYLPREDNISOLONE SODIUM SUCCINATE 125 MG/2ML
80 INJECTION, POWDER, LYOPHILIZED, FOR SOLUTION INTRAMUSCULAR; INTRAVENOUS ONCE
Status: COMPLETED | OUTPATIENT
Start: 2020-08-23 | End: 2020-08-23

## 2020-08-23 RX ORDER — ALBUTEROL SULFATE 2.5 MG/3ML
2.5 SOLUTION RESPIRATORY (INHALATION)
Status: DISCONTINUED | OUTPATIENT
Start: 2020-08-23 | End: 2020-08-27 | Stop reason: HOSPADM

## 2020-08-23 RX ORDER — POTASSIUM CHLORIDE 20 MEQ/1
40 TABLET, EXTENDED RELEASE ORAL PRN
Status: DISCONTINUED | OUTPATIENT
Start: 2020-08-23 | End: 2020-08-27 | Stop reason: HOSPADM

## 2020-08-23 RX ORDER — POTASSIUM CHLORIDE 7.45 MG/ML
10 INJECTION INTRAVENOUS PRN
Status: DISCONTINUED | OUTPATIENT
Start: 2020-08-23 | End: 2020-08-27 | Stop reason: HOSPADM

## 2020-08-23 RX ORDER — MAGNESIUM SULFATE 1 G/100ML
1 INJECTION INTRAVENOUS PRN
Status: DISCONTINUED | OUTPATIENT
Start: 2020-08-23 | End: 2020-08-24 | Stop reason: SDUPTHER

## 2020-08-23 RX ORDER — POTASSIUM CHLORIDE 20 MEQ/1
40 TABLET, EXTENDED RELEASE ORAL ONCE
Status: COMPLETED | OUTPATIENT
Start: 2020-08-23 | End: 2020-08-23

## 2020-08-23 RX ORDER — METOPROLOL SUCCINATE 50 MG/1
50 TABLET, EXTENDED RELEASE ORAL 2 TIMES DAILY
Status: DISCONTINUED | OUTPATIENT
Start: 2020-08-23 | End: 2020-08-27 | Stop reason: HOSPADM

## 2020-08-23 RX ORDER — SODIUM CHLORIDE 0.9 % (FLUSH) 0.9 %
10 SYRINGE (ML) INJECTION EVERY 12 HOURS SCHEDULED
Status: DISCONTINUED | OUTPATIENT
Start: 2020-08-23 | End: 2020-08-27 | Stop reason: HOSPADM

## 2020-08-23 RX ORDER — ACETAMINOPHEN 325 MG/1
650 TABLET ORAL EVERY 4 HOURS PRN
Status: DISCONTINUED | OUTPATIENT
Start: 2020-08-23 | End: 2020-08-27 | Stop reason: HOSPADM

## 2020-08-23 RX ADMIN — AZITHROMYCIN MONOHYDRATE 500 MG: 500 INJECTION, POWDER, LYOPHILIZED, FOR SOLUTION INTRAVENOUS at 16:39

## 2020-08-23 RX ADMIN — CEFTRIAXONE SODIUM 1 G: 1 INJECTION, POWDER, FOR SOLUTION INTRAMUSCULAR; INTRAVENOUS at 16:21

## 2020-08-23 RX ADMIN — METOPROLOL SUCCINATE 50 MG: 50 TABLET, EXTENDED RELEASE ORAL at 21:50

## 2020-08-23 RX ADMIN — POTASSIUM CHLORIDE 40 MEQ: 20 TABLET, EXTENDED RELEASE ORAL at 14:26

## 2020-08-23 RX ADMIN — POTASSIUM CHLORIDE 40 MEQ: 20 TABLET, EXTENDED RELEASE ORAL at 21:50

## 2020-08-23 RX ADMIN — METHYLPREDNISOLONE SODIUM SUCCINATE 80 MG: 125 INJECTION, POWDER, FOR SOLUTION INTRAMUSCULAR; INTRAVENOUS at 16:39

## 2020-08-23 RX ADMIN — ENOXAPARIN SODIUM 40 MG: 40 INJECTION SUBCUTANEOUS at 21:50

## 2020-08-23 ASSESSMENT — PAIN SCALES - GENERAL
PAINLEVEL_OUTOF10: 0
PAINLEVEL_OUTOF10: 0
PAINLEVEL_OUTOF10: 8

## 2020-08-23 ASSESSMENT — ENCOUNTER SYMPTOMS: COUGH: 1

## 2020-08-23 NOTE — ED PROVIDER NOTES
EMERGENCY DEPARTMENT ENCOUNTER    Pt Name: Nikki Garces  MRN: [id number]  [place] 1945  Date of evaluation: 8/23/20  CHIEF COMPLAINT       Chief Complaint   Patient presents with    Ankle Pain    Fall     many falls    Other     Weight gain, pulse ox in triate 83%, is suppose to wear oxygen but doesnt all the time, yaquelin says she falls and passes out alot.  Leg Swelling     HISTORY OF PRESENT ILLNESS   59-year-old female with history of COPD and diabetes presents emergency room for recurrent falls and pain to the left ankle. Patient is a poor historian; she is at times describing possible brief syncope with these falls however at other times states that she does not pass out. Patient states that this has been intermittent over the last several weeks and seems to occur with standing. Patient is here with her daughter who states that when she fell today she started having a lot of pain in the left ankle. Patient's oxygen was 83% on room air when she initially came in. Patient states that she was on oxygen long-term but has had some improvement in her hypoxia and it no longer is using the home oxygen on a consistent basis. She does report increased cough but is unsure as to how long this is been ongoing. She has not had any fevers at home that she is aware of. REVIEW OF SYSTEMS     Review of Systems   Constitutional: Positive for fatigue. Respiratory: Positive for cough. All other systems reviewed and are negative.       PASTMEDICAL HISTORY     Past Medical History:   Diagnosis Date    Anemia     Chronic obstructive pulmonary disease ([address] 3/26/2018    Diabetes mellitus ([address]     GERD (gastroesophageal reflux disease)     Hypertension     Other hyperlipidemia 2/97/3249    Systolic congestive heart failure ([address] 12/17/2019     Past Problem List  Patient Active Problem List   Diagnosis Code    GERD (gastroesophageal reflux disease) K21.9    Hypertension I10    Vertigo R42    Iron deficiency anemia due to chronic blood loss D50.0    Other hyperlipidemia E78.49    Chronic obstructive pulmonary disease (HCC) J44.9    Influenza vaccination declined Z28.21    Pneumococcal vaccination declined Z28.21    Smoker F17.200    Uncontrolled type 1 diabetes mellitus with hyperglycemia (HCC) E10.65    Edema of both lower extremities due to peripheral venous insufficiency I87.2    Oxygen dependent A16.56    Systolic congestive heart failure (HCC) I50.20    Warfarin-induced coagulopathy (HCC) D68.32, T45.515A    Chronic atrial fibrillation I48.20    Syncope and collapse R55     SURGICAL HISTORY       Past Surgical History:   Procedure Laterality Date    COLONOSCOPY      HYSTERECTOMY      TONSILLECTOMY      TUBAL LIGATION      WISDOM TOOTH EXTRACTION       CURRENT MEDICATIONS       Previous Medications    AMLODIPINE (NORVASC) 10 MG TABLET    TAKE ONE TABLET BY MOUTH DAILY    AMMONIUM LACTATE (LAC-HYDRIN) 12 % LOTION    Apply topically daily.     BLOOD GLUCOSE TEST STRIPS (JERZY CONTOUR TEST) STRIP    USE 1 STRIP BY IN VITRO DAILY AS NEEDED    FERROUS FUMARATE-VITAMIN C (MOMO-SEQUELS) 65-25 MG TBCR CR TABLET    Take 1 tablet by mouth daily (with breakfast)    FUROSEMIDE (LASIX) 40 MG TABLET    TAKE ONE TABLET BY MOUTH DAILY    HUMALOG KWIKPEN 100 UNIT/ML PEN    INJECT 20 UNITS SUBCUTANEOUSLY DAILY    INSULIN PEN NEEDLE (PEN NEEDLES) 31G X 6 MM MISC    Inject 1 box into the skin 2 times daily    LANTUS SOLOSTAR 100 UNIT/ML INJECTION PEN    INJECT 80 UNITS UNDER THE SKIN TWO TIMES A DAY    LOSARTAN (COZAAR) 50 MG TABLET    Take 1 tablet by mouth daily    METFORMIN (GLUCOPHAGE-XR) 500 MG EXTENDED RELEASE TABLET    TAKE ONE TABLET BY MOUTH TWICE A DAY    METOPROLOL SUCCINATE (TOPROL XL) 50 MG EXTENDED RELEASE TABLET    Take 1 tablet by mouth 2 times daily Take 1 tab PO BID - PER CARDIOLOGY    MULTIPLE VITAMIN (DAILY-NESSA) TABS    TAKE ONE TABLET BY MOUTH DAILY    MULTIPLE VITAMINS-MINERALS (MULTIVITAMIN WITH MINERALS) TABLET    Take 1 tablet by mouth daily    OMEPRAZOLE (PRILOSEC) 20 MG DELAYED RELEASE CAPSULE    Take 1 capsule by mouth Daily    POTASSIUM CHLORIDE (KLOR-CON M) 20 MEQ EXTENDED RELEASE TABLET    TAKE THREE TABLETS BY MOUTH DAILY. PATIENT NEEDS APPOINTMENT    PROAIR  (90 BASE) MCG/ACT INHALER    INHALE TWO PUFFS BY MOUTH EVERY 6 HOURS AS NEEDED FOR WHEEZING    SYMBICORT 160-4.5 MCG/ACT AERO    INHALE TWO PUFFS BY MOUTH TWICE A DAY    TIOTROPIUM (SPIRIVA HANDIHALER) 18 MCG INHALATION CAPSULE    Inhale 1 capsule into the lungs daily    UREA (CARMOL) 20 % LOTION    Apply topically as needed. VITAMIN D, CHOLECALCIFEROL, 400 UNITS TABS    Take 1 tablet by mouth 2 times daily    WARFARIN (COUMADIN) 5 MG TABLET    TAKE ONE TABLET BY MOUTH DAILY AS DIRECTED BY  [facility]     ALLERGIES     has No Known Allergies. FAMILY HISTORY     She indicated that her mother is . She indicated that her father is . SOCIAL HISTORY       Social History     Tobacco Use    Smoking status: Current Every Day Smoker     Packs/day: 1.00     Years: 30.00     Pack years: 30.00     Types: Cigarettes     Start date:     Smokeless tobacco: Never Used   Substance Use Topics    Alcohol use: Yes     Comment: social    Drug use: No     PHYSICAL EXAM     INITIAL VITALS: BP (!) 169/64   Pulse 105   Temp 98.5 °F (36.9 °C) (Oral)   Resp 21   Ht 5' 7\" (1.702 m)   Wt 224 lb (101.6 kg)   LMP  (LMP Unknown)   SpO2 94%   BMI 35.08 kg/m²    Physical Exam  Constitutional:       General: She is not in acute distress. Appearance: She is well-developed. HENT:      Head: Normocephalic. Eyes:      Pupils: Pupils are equal, round, and reactive to light. Cardiovascular:      Rate and Rhythm: Normal rate and regular rhythm. Heart sounds: Normal heart sounds. Pulmonary:      Effort: Pulmonary effort is normal. No respiratory distress.       Breath sounds: Decreased breath sounds and rhonchi present. Abdominal:      General: Bowel sounds are normal.      Palpations: Abdomen is soft. Tenderness: There is no abdominal tenderness. Musculoskeletal:      Left ankle: She exhibits decreased range of motion. Tenderness. Skin:     General: Skin is warm and dry. Neurological:      Mental Status: She is alert and oriented to person, place, and time. MEDICAL DECISION MAKIN-year-old female coming the emergency room for left ankle pain related to a fall. Daughter is present with the patient and reports that patient had been remarking that she had several recurrent episodes of syncope over the last couple of weeks at home. Patient will report that she is standing up and will feel like she is about to blackout. She states that she usually awakens prior to falling to the ground but is unable to catch herself. Patient likely requiring home O2 on a 24-hour basis; she was hypoxic at 83% on room air when coming in today. Will be prophylactically treated for possible community-acquired pneumonia given her increased cough and elevated white count. Patient does have history of COPD. Podiatry consulted for nondisplaced distal tibia fracture. CRITICAL CARE:       PROCEDURES:    Procedures    DIAGNOSTIC RESULTS   EKG:All EKG's are interpreted by the Emergency Department Physician who either signs or Co-signs this chart in the absence of a cardiologist.  EKG- sinus tachycardia rate 107  ST depression II, aVL, V4  Nonspecific T wave changes  QTc 469  My impression: possible ischemic changes      RADIOLOGY:All plain film, CT, MRI, and formal ultrasound images (except ED bedside ultrasound) are read by the radiologist, see reports below, unless otherwisenoted in MDM or here. XR CHEST PORTABLE   Final Result   1. Cardiomegaly with pulmonary edema. 2. Low lung volumes with bibasilar atelectasis.          XR ANKLE LEFT (MIN 3 VIEWS)   Final Result Nondisplaced fracture of the distal fibula           LABS: All lab results were reviewed by myself, and all abnormals are listed below.   Labs Reviewed   CBC WITH AUTO DIFFERENTIAL - Abnormal; Notable for the following components:       Result Value    WBC 15.4 (*)     Hemoglobin 10.6 (*)     MCV 81.2 (*)     MCH 23.5 (*)     RDW 18.9 (*)     NRBC Automated 0.6 (*)     Seg Neutrophils 75 (*)     Lymphocytes 17 (*)     Immature Granulocytes 1 (*)     Segs Absolute 11.66 (*)     All other components within normal limits   COMPREHENSIVE METABOLIC PANEL W/ REFLEX TO MG FOR LOW K - Abnormal; Notable for the following components:    Glucose 68 (*)     CREATININE 0.96 (*)     Bun/Cre Ratio 8 (*)     Potassium 2.7 (*)     Total Protein 8.5 (*)     Alb 3.0 (*)     GFR Non- 57 (*)     All other components within normal limits   TROPONIN - Abnormal; Notable for the following components:    Troponin, High Sensitivity 25 (*)     All other components within normal limits   PROTIME-INR - Abnormal; Notable for the following components:    Protime 36.5 (*)     All other components within normal limits   APTT - Abnormal; Notable for the following components:    PTT 57.8 (*)     All other components within normal limits   MAGNESIUM - Abnormal; Notable for the following components:    Magnesium 1.5 (*)     All other components within normal limits   C-REACTIVE PROTEIN - Abnormal; Notable for the following components:    CRP 39.5 (*)     All other components within normal limits   LACTATE DEHYDROGENASE - Abnormal; Notable for the following components:     (*)     All other components within normal limits   CULTURE, BLOOD 1   CULTURE, BLOOD 1   FERRITIN   PROCALCITONIN   COVID-19 AMBULATORY   TROPONIN   TROPONIN       EMERGENCY DEPARTMENTCOURSE:         Vitals:    Vitals:    08/23/20 1321 08/23/20 1329 08/23/20 1515 08/23/20 1653   BP: (!) 169/64      Pulse: 114  99 105   Resp: 24   21   Temp: 98.5 °F (36.9 °C)

## 2020-08-23 NOTE — PROGRESS NOTES
Patient admitted to room 1017-2. VS taken, telemetry placed and call light given/within reach. Patient A&O and given room orientation. Orders released/reviewed, initial assessment completed and navigator started. Pt placed in Droplet Plus isolation for COVID-19 rule out. Patient wears 3 liters oxygen via nasal cannula 24/7. Sp02 94% on 3 L NC at this time. See chart for more detail.

## 2020-08-24 ENCOUNTER — CARE COORDINATION (OUTPATIENT)
Dept: CARE COORDINATION | Age: 75
End: 2020-08-24

## 2020-08-24 PROBLEM — E87.6 HYPOKALEMIA: Status: ACTIVE | Noted: 2020-08-24

## 2020-08-24 PROBLEM — E83.42 HYPOMAGNESEMIA: Status: ACTIVE | Noted: 2020-08-24

## 2020-08-24 PROBLEM — J44.1 COPD WITH ACUTE EXACERBATION (HCC): Status: ACTIVE | Noted: 2020-08-24

## 2020-08-24 LAB
ABSOLUTE EOS #: 0 K/UL (ref 0–0.4)
ABSOLUTE IMMATURE GRANULOCYTE: 0.16 K/UL (ref 0–0.3)
ABSOLUTE LYMPH #: 1.24 K/UL (ref 1–4.8)
ABSOLUTE MONO #: 0.16 K/UL (ref 0.2–0.8)
ANION GAP SERPL CALCULATED.3IONS-SCNC: 12 MMOL/L (ref 9–17)
BASOPHILS # BLD: 0 %
BASOPHILS ABSOLUTE: 0 K/UL (ref 0–0.2)
BUN BLDV-MCNC: 10 MG/DL (ref 8–23)
BUN/CREAT BLD: 14 (ref 9–20)
CALCIUM SERPL-MCNC: 8.3 MG/DL (ref 8.6–10.4)
CHLORIDE BLD-SCNC: 101 MMOL/L (ref 98–107)
CHOLESTEROL, FASTING: 131 MG/DL
CHOLESTEROL/HDL RATIO: 2.3
CO2: 24 MMOL/L (ref 20–31)
CREAT SERPL-MCNC: 0.73 MG/DL (ref 0.5–0.9)
DIFFERENTIAL TYPE: ABNORMAL
EKG ATRIAL RATE: 107 BPM
EKG P AXIS: 55 DEGREES
EKG P-R INTERVAL: 152 MS
EKG Q-T INTERVAL: 352 MS
EKG QRS DURATION: 78 MS
EKG QTC CALCULATION (BAZETT): 469 MS
EKG R AXIS: 9 DEGREES
EKG T AXIS: 119 DEGREES
EKG VENTRICULAR RATE: 107 BPM
EOSINOPHILS RELATIVE PERCENT: 0 % (ref 1–4)
ESTIMATED AVERAGE GLUCOSE: 160 MG/DL
GFR AFRICAN AMERICAN: >60 ML/MIN
GFR NON-AFRICAN AMERICAN: >60 ML/MIN
GFR SERPL CREATININE-BSD FRML MDRD: ABNORMAL ML/MIN/{1.73_M2}
GFR SERPL CREATININE-BSD FRML MDRD: ABNORMAL ML/MIN/{1.73_M2}
GLUCOSE BLD-MCNC: 235 MG/DL (ref 65–105)
GLUCOSE BLD-MCNC: 272 MG/DL (ref 65–105)
GLUCOSE BLD-MCNC: 289 MG/DL (ref 65–105)
GLUCOSE BLD-MCNC: 312 MG/DL (ref 70–99)
GLUCOSE BLD-MCNC: 392 MG/DL (ref 65–105)
HBA1C MFR BLD: 7.2 % (ref 4–6)
HCT VFR BLD CALC: 37.7 % (ref 36.3–47.1)
HDLC SERPL-MCNC: 56 MG/DL
HEMOGLOBIN: 10.6 G/DL (ref 11.9–15.1)
IMMATURE GRANULOCYTES: 1 %
LDL CHOLESTEROL: 51 MG/DL (ref 0–130)
LYMPHOCYTES # BLD: 8 % (ref 24–44)
MAGNESIUM: 2.1 MG/DL (ref 1.6–2.6)
MCH RBC QN AUTO: 23.6 PG (ref 25.2–33.5)
MCHC RBC AUTO-ENTMCNC: 28.1 G/DL (ref 28.4–34.8)
MCV RBC AUTO: 83.8 FL (ref 82.6–102.9)
MONOCYTES # BLD: 1 % (ref 1–7)
MORPHOLOGY: ABNORMAL
NRBC AUTOMATED: 0 PER 100 WBC
PDW BLD-RTO: 18.8 % (ref 11.8–14.4)
PLATELET # BLD: 368 K/UL (ref 138–453)
PLATELET ESTIMATE: ABNORMAL
PMV BLD AUTO: 9.6 FL (ref 8.1–13.5)
POTASSIUM SERPL-SCNC: 3.8 MMOL/L (ref 3.7–5.3)
RBC # BLD: 4.5 M/UL (ref 3.95–5.11)
RBC # BLD: ABNORMAL 10*6/UL
SEG NEUTROPHILS: 90 % (ref 36–66)
SEGMENTED NEUTROPHILS ABSOLUTE COUNT: 13.94 K/UL (ref 1.8–7.7)
SODIUM BLD-SCNC: 137 MMOL/L (ref 135–144)
TRIGLYCERIDE, FASTING: 120 MG/DL
TSH SERPL DL<=0.05 MIU/L-ACNC: 0.38 MIU/L (ref 0.3–5)
VLDLC SERPL CALC-MCNC: NORMAL MG/DL (ref 1–30)
WBC # BLD: 15.5 K/UL (ref 3.5–11.3)
WBC # BLD: ABNORMAL 10*3/UL

## 2020-08-24 PROCEDURE — 94760 N-INVAS EAR/PLS OXIMETRY 1: CPT

## 2020-08-24 PROCEDURE — 2060000000 HC ICU INTERMEDIATE R&B

## 2020-08-24 PROCEDURE — 97535 SELF CARE MNGMENT TRAINING: CPT

## 2020-08-24 PROCEDURE — 84443 ASSAY THYROID STIM HORMONE: CPT

## 2020-08-24 PROCEDURE — 2700000000 HC OXYGEN THERAPY PER DAY

## 2020-08-24 PROCEDURE — 97116 GAIT TRAINING THERAPY: CPT

## 2020-08-24 PROCEDURE — 97166 OT EVAL MOD COMPLEX 45 MIN: CPT

## 2020-08-24 PROCEDURE — 94640 AIRWAY INHALATION TREATMENT: CPT

## 2020-08-24 PROCEDURE — 85025 COMPLETE CBC W/AUTO DIFF WBC: CPT

## 2020-08-24 PROCEDURE — 93010 ELECTROCARDIOGRAM REPORT: CPT | Performed by: INTERNAL MEDICINE

## 2020-08-24 PROCEDURE — 97530 THERAPEUTIC ACTIVITIES: CPT

## 2020-08-24 PROCEDURE — 83735 ASSAY OF MAGNESIUM: CPT

## 2020-08-24 PROCEDURE — 36415 COLL VENOUS BLD VENIPUNCTURE: CPT

## 2020-08-24 PROCEDURE — 6370000000 HC RX 637 (ALT 250 FOR IP): Performed by: FAMILY MEDICINE

## 2020-08-24 PROCEDURE — 6360000002 HC RX W HCPCS: Performed by: EMERGENCY MEDICINE

## 2020-08-24 PROCEDURE — 83036 HEMOGLOBIN GLYCOSYLATED A1C: CPT

## 2020-08-24 PROCEDURE — 6360000002 HC RX W HCPCS: Performed by: FAMILY MEDICINE

## 2020-08-24 PROCEDURE — 97162 PT EVAL MOD COMPLEX 30 MIN: CPT

## 2020-08-24 PROCEDURE — 80061 LIPID PANEL: CPT

## 2020-08-24 PROCEDURE — 2580000003 HC RX 258: Performed by: EMERGENCY MEDICINE

## 2020-08-24 PROCEDURE — 82947 ASSAY GLUCOSE BLOOD QUANT: CPT

## 2020-08-24 PROCEDURE — 99223 1ST HOSP IP/OBS HIGH 75: CPT | Performed by: FAMILY MEDICINE

## 2020-08-24 PROCEDURE — 80048 BASIC METABOLIC PNL TOTAL CA: CPT

## 2020-08-24 RX ORDER — METFORMIN HYDROCHLORIDE 500 MG/1
500 TABLET, EXTENDED RELEASE ORAL 2 TIMES DAILY
Status: DISCONTINUED | OUTPATIENT
Start: 2020-08-24 | End: 2020-08-27 | Stop reason: HOSPADM

## 2020-08-24 RX ORDER — AMLODIPINE BESYLATE 10 MG/1
10 TABLET ORAL DAILY
Status: DISCONTINUED | OUTPATIENT
Start: 2020-08-24 | End: 2020-08-27 | Stop reason: HOSPADM

## 2020-08-24 RX ORDER — DEXTROSE MONOHYDRATE 50 MG/ML
100 INJECTION, SOLUTION INTRAVENOUS PRN
Status: DISCONTINUED | OUTPATIENT
Start: 2020-08-24 | End: 2020-08-27 | Stop reason: HOSPADM

## 2020-08-24 RX ORDER — DEXTROSE MONOHYDRATE 25 G/50ML
12.5 INJECTION, SOLUTION INTRAVENOUS PRN
Status: DISCONTINUED | OUTPATIENT
Start: 2020-08-24 | End: 2020-08-27 | Stop reason: HOSPADM

## 2020-08-24 RX ORDER — INSULIN GLARGINE 100 [IU]/ML
60 INJECTION, SOLUTION SUBCUTANEOUS NIGHTLY
Status: DISCONTINUED | OUTPATIENT
Start: 2020-08-24 | End: 2020-08-26

## 2020-08-24 RX ORDER — NICOTINE 21 MG/24HR
1 PATCH, TRANSDERMAL 24 HOURS TRANSDERMAL DAILY
Status: DISCONTINUED | OUTPATIENT
Start: 2020-08-25 | End: 2020-08-27 | Stop reason: HOSPADM

## 2020-08-24 RX ORDER — FUROSEMIDE 40 MG/1
40 TABLET ORAL DAILY
Status: DISCONTINUED | OUTPATIENT
Start: 2020-08-24 | End: 2020-08-27 | Stop reason: HOSPADM

## 2020-08-24 RX ORDER — PANTOPRAZOLE SODIUM 20 MG/1
20 TABLET, DELAYED RELEASE ORAL
Status: DISCONTINUED | OUTPATIENT
Start: 2020-08-25 | End: 2020-08-27 | Stop reason: HOSPADM

## 2020-08-24 RX ORDER — INSULIN GLARGINE 100 [IU]/ML
INJECTION, SOLUTION SUBCUTANEOUS
Status: ON HOLD | COMMUNITY
Start: 2020-06-13 | End: 2020-08-27 | Stop reason: HOSPADM

## 2020-08-24 RX ORDER — NICOTINE POLACRILEX 4 MG
15 LOZENGE BUCCAL PRN
Status: DISCONTINUED | OUTPATIENT
Start: 2020-08-24 | End: 2020-08-27 | Stop reason: HOSPADM

## 2020-08-24 RX ORDER — FUROSEMIDE 40 MG/1
TABLET ORAL
Status: ON HOLD | COMMUNITY
End: 2020-08-24

## 2020-08-24 RX ORDER — BUDESONIDE AND FORMOTEROL FUMARATE DIHYDRATE 160; 4.5 UG/1; UG/1
2 AEROSOL RESPIRATORY (INHALATION) 2 TIMES DAILY
Status: DISCONTINUED | OUTPATIENT
Start: 2020-08-24 | End: 2020-08-27 | Stop reason: HOSPADM

## 2020-08-24 RX ORDER — OMEPRAZOLE 20 MG/1
CAPSULE, DELAYED RELEASE ORAL
Status: ON HOLD | COMMUNITY
End: 2020-08-24

## 2020-08-24 RX ORDER — LOSARTAN POTASSIUM 50 MG/1
50 TABLET ORAL DAILY
Status: DISCONTINUED | OUTPATIENT
Start: 2020-08-24 | End: 2020-08-27 | Stop reason: HOSPADM

## 2020-08-24 RX ORDER — METOPROLOL SUCCINATE 50 MG/1
50 TABLET, EXTENDED RELEASE ORAL 2 TIMES DAILY
COMMUNITY
Start: 2020-06-17 | End: 2020-09-08

## 2020-08-24 RX ORDER — ALBUTEROL SULFATE 90 UG/1
2 AEROSOL, METERED RESPIRATORY (INHALATION) EVERY 6 HOURS PRN
Status: DISCONTINUED | OUTPATIENT
Start: 2020-08-24 | End: 2020-08-27 | Stop reason: HOSPADM

## 2020-08-24 RX ORDER — MAGNESIUM SULFATE 1 G/100ML
1 INJECTION INTRAVENOUS PRN
Status: DISCONTINUED | OUTPATIENT
Start: 2020-08-24 | End: 2020-08-27 | Stop reason: HOSPADM

## 2020-08-24 RX ADMIN — Medication 10 ML: at 08:39

## 2020-08-24 RX ADMIN — METOPROLOL SUCCINATE 50 MG: 50 TABLET, EXTENDED RELEASE ORAL at 21:00

## 2020-08-24 RX ADMIN — ENOXAPARIN SODIUM 40 MG: 40 INJECTION SUBCUTANEOUS at 08:39

## 2020-08-24 RX ADMIN — AZITHROMYCIN MONOHYDRATE 500 MG: 250 TABLET ORAL at 08:40

## 2020-08-24 RX ADMIN — AMLODIPINE BESYLATE 10 MG: 10 TABLET ORAL at 15:02

## 2020-08-24 RX ADMIN — MAGNESIUM SULFATE HEPTAHYDRATE 1 G: 1 INJECTION, SOLUTION INTRAVENOUS at 00:44

## 2020-08-24 RX ADMIN — INSULIN LISPRO 10 UNITS: 100 INJECTION, SOLUTION INTRAVENOUS; SUBCUTANEOUS at 12:36

## 2020-08-24 RX ADMIN — INSULIN LISPRO 2 UNITS: 100 INJECTION, SOLUTION INTRAVENOUS; SUBCUTANEOUS at 21:53

## 2020-08-24 RX ADMIN — Medication 10 ML: at 21:04

## 2020-08-24 RX ADMIN — METFORMIN HYDROCHLORIDE 500 MG: 500 TABLET, EXTENDED RELEASE ORAL at 21:00

## 2020-08-24 RX ADMIN — METOPROLOL SUCCINATE 50 MG: 50 TABLET, EXTENDED RELEASE ORAL at 08:40

## 2020-08-24 RX ADMIN — BUDESONIDE AND FORMOTEROL FUMARATE DIHYDRATE 2 PUFF: 160; 4.5 AEROSOL RESPIRATORY (INHALATION) at 20:42

## 2020-08-24 RX ADMIN — INSULIN GLARGINE 60 UNITS: 100 INJECTION, SOLUTION SUBCUTANEOUS at 21:00

## 2020-08-24 RX ADMIN — MAGNESIUM SULFATE HEPTAHYDRATE 1 G: 1 INJECTION, SOLUTION INTRAVENOUS at 03:00

## 2020-08-24 RX ADMIN — INSULIN LISPRO 6 UNITS: 100 INJECTION, SOLUTION INTRAVENOUS; SUBCUTANEOUS at 17:17

## 2020-08-24 ASSESSMENT — ENCOUNTER SYMPTOMS
EYE PAIN: 0
VOMITING: 0
DIARRHEA: 0
COUGH: 1
SORE THROAT: 0
SINUS PAIN: 0
ABDOMINAL PAIN: 0
NAUSEA: 0
SHORTNESS OF BREATH: 1

## 2020-08-24 ASSESSMENT — PAIN SCALES - GENERAL
PAINLEVEL_OUTOF10: 0

## 2020-08-24 NOTE — CARE COORDINATION
Writer reached out to pt to offer PERDOMO [facility]. A man states pt is not home. A message was left to have patient call back. Office number left. [phone].

## 2020-08-24 NOTE — H&P
Syncope with frequent falls  Left ankle pain    History of present illness  77-year-old -American female with underlying history significant for congestive heart failure for which she is established with cardiology, chronic A. fib, chronic anticoagulation, COPD with active smoker is presented to the emergency room with ongoing history of frequent falls especially when she stands up. However, she denies complete passing out or loss of bowel or bladder dysfunction. She denies any respiratory distress or travel outside  On presentation the emergency room she was seen to have significant hypokalemia and hypomagnesemia with chest x-ray showing atelectasis and chronic COPD change  Past medical history  Congestive heart failure  Chronic A. fib  Hypertension  Hyperlipidemia  COPD-home O2 dependent  Osteopenia  Diabetes mellitus  GERD  Past surgical history noncontributory  Medications per list reviewed  Allergies per list reviewed  Social history positive for tobacco use, negative for alcohol or illicit drug use  Family history  Review of system all 12 systems reviewed per  History of present illness  Vitals afebrile hemodynamically stable  Systemic exam  HEENT unremarkable  Neck trachea central no JVD or carotid bruit  Lungs bilateral CTA few rhonchi's. Negative rales crepitus or accessory muscle use weakness  CVS A. fib. Rate controlled dynamic symmetrical pulses  Abdomen soft discomfort benign to palpation normoactive bowel sounds  CNS no acute focal sensorimotor deficit  Lower extremity no pedal edema good capillary refill  Skin no tenting no lesions  Labs  Results for Mears November (MRN [id number]) as of 8/24/2020 16:48   Ref.  Range 8/23/2020 21:17 8/24/2020 05:31   Sodium Latest Ref Range: 135 - 144 mmol/L  137   Potassium Latest Ref Range: 3.7 - 5.3 mmol/L  3.8   Chloride Latest Ref Range: 98 - 107 mmol/L  101   CO2 Latest Ref Range: 20 - 31 mmol/L  24   BUN Latest Ref Range: 8 - 23 mg/dL  10   Creatinine 16:48   Ref. Range 8/24/2020 09:33   Magnesium Latest Ref Range: 1.6 - 2.6 mg/dL 2.1   Chol/HDL Ratio Latest Ref Range: <5  2.3   Cholesterol, Fasting Latest Ref Range: <200 mg/dL 131   HDL Cholesterol Latest Ref Range: >40 mg/dL 56   LDL Cholesterol Latest Ref Range: 0 - 130 mg/dL 51   VLDL Latest Ref Range: 1 - 30 mg/dL NOT REPORTED   Triglyceride, Fasting Latest Ref Range: <150 mg/dL 120   Hemoglobin A1C Latest Ref Range: 4.0 - 6.0 % 7.2 (H)   eAG (mg/dL) Latest Units: mg/dL 160   TSH Latest Ref Range: 0.30 - 5.00 mIU/L 0.38     FINDINGS:    The cardiac silhouette is enlarged.  Vascular calcifications are noted along    the aortic arch.  There is pulmonary edema with low lung volumes and    bibasilar atelectasis.  The visualized osseous structures are unremarkable.              Impression    1. Cardiomegaly with pulmonary edema. 2. Low lung volumes with bibasilar atelectasis.             FINDINGS:    Lateral soft tissue swelling.  Hairline spiral fracture of the distal fibula    best visualized in the lateral view.  Distal tibia intact.  Ankle joint intact              Impression    Nondisplaced fracture of the distal fibula         Assessment and plan  68-year-old female with underlying history significant for congestive heart failure, chronic A. fib who is established with cardiologist presented with syncope and frequent falls. Currently she has well-controlled ventricular response rate, normotensive. Pending cardiology evaluation  Fall precaution is advised  PT/OT consultation  COPD with modest exacerbation. Continue respiratory protocol, she is home O2 dependent. Beta agonist and inhaled corticosteroid  Smoker. She is counseled. Placed on nicotine patch  Clinical sleep apnea. Once approval is ruled out, we will have respiratory place her on CPAP  Insulin-dependent diabetes mellitus with noncompliance with diet. A1c 7.4 however she has significant hyperglycemia.   Sliding scale insulin  Dynamically stable  Hypokalemia/hypomagnesemia on replacement protocol-improving  Nondisplaced left distal fibular fracture. Patient has been seen by podiatry and placed on immobilizer and pain is well controlled  Chronic A. fib. Rate controlled. Resume Coumadin titrated by pharmacy  GI prophylaxis  Medications and labs reviewed  Plan of care discussed with nursing staff  We will follow along  This note is created with a voice recognition program and while intend to generate a document that accurately reflects the content of the visit, no guarantee can be provided that every mistake has been identified and corrected by editing.

## 2020-08-24 NOTE — PLAN OF CARE
Problem: Falls - Risk of:  Goal: Will remain free from falls  Description: Will remain free from falls  Outcome: Ongoing   Fall risk assessment completed. Patient instructed to use call light. Bed locked and in lowest position, side rails up 2/4, call light and bedside table within reach, clutter removed, and non-skid footwear on when pt out of bed. Hourly rounds will continue. Problem: Breathing Pattern - Ineffective:  Goal: Ability to achieve and maintain a regular respiratory rate will improve  Description: Ability to achieve and maintain a regular respiratory rate will improve  Outcome: Ongoing   Respiratory assessment completed. Patient is able to achieve and maintain regular respiratory rate at this time. Patient oxygen saturation is 92-94% on 3L nasal canula which patient wears 24/7. Lung sounds are diminished with expiratory wheezes. Orders include oral antibiotics. Will continue to monitor.

## 2020-08-24 NOTE — PROGRESS NOTES
[facility]ernard[facility]. Syncope and collapse [R55] . Vitals:    08/24/20 1213   BP: 135/60   Pulse: 88   Resp: 16   Temp: 97.7 °F (36.5 °C)   SpO2: 94%   . Patients home meds are   Prior to Admission medications    Medication Sig Start Date End Date Taking? Authorizing Provider   furosemide (LASIX) 40 MG tablet 1 tablet    Historical Provider, MD   LANTUS SOLOSTAR 100 UNIT/ML injection pen  6/13/20   Historical Provider, MD   metoprolol succinate (TOPROL XL) 50 MG extended release tablet  6/17/20   Historical Provider, MD   omeprazole (PRILOSEC) 20 MG delayed release capsule 1 capsule 30 minutes before morning meal    Historical Provider, MD   metoprolol succinate (TOPROL XL) 50 MG extended release tablet Take 1 tablet by mouth 2 times daily Take 1 tab PO BID - PER CARDIOLOGY 8/21/20   Kamar Cleveland MD   metFORMIN (GLUCOPHAGE-XR) 500 MG extended release tablet TAKE ONE TABLET BY MOUTH TWICE A DAY 8/10/20   Kamar Cleveland MD   ammonium lactate (LAC-HYDRIN) 12 % lotion Apply topically daily. 7/31/20   Kamar Cleveland MD   furosemide (LASIX) 40 MG tablet TAKE ONE TABLET BY MOUTH DAILY 7/9/20   Kamar Cleveland MD   LANTUS SOLOSTAR 100 UNIT/ML injection pen INJECT 80 UNITS UNDER THE SKIN TWO TIMES A DAY 7/2/20   Kamar Cleveland MD   amLODIPine (NORVASC) 10 MG tablet TAKE ONE TABLET BY MOUTH DAILY 6/29/20   Kamar Cleveland MD   warfarin (COUMADIN) 5 MG tablet TAKE ONE TABLET BY MOUTH DAILY AS DIRECTED BY [facility] 6/29/20   Kamar Cleveland MD   potassium chloride (KLOR-CON M) 20 MEQ extended release tablet TAKE THREE TABLETS BY MOUTH DAILY.  PATIENT NEEDS APPOINTMENT 4/22/20   Kamar Cleveland MD   blood glucose test strips (JERZY CONTOUR TEST) strip USE 1 STRIP BY IN VITRO DAILY AS NEEDED 2/3/20   Kamar Cleveland MD   PROAIR  (93 Base) MCG/ACT inhaler INHALE TWO PUFFS BY MOUTH EVERY 6 HOURS AS NEEDED FOR WHEEZING 10/31/19   Kamar Cleveland MD   Vitamin D, Cholecalciferol, 400 units TABS Take 1 tablet by mouth 2 times daily 9/25/19   Ying Bradshaw MD   HUMALOG KWIKPEN 100 UNIT/ML pen INJECT 20 UNITS SUBCUTANEOUSLY DAILY 9/21/19   Ying Bradshaw MD   tiotropium (Rosendo Andrea) 18 MCG inhalation capsule Inhale 1 capsule into the lungs daily  Patient not taking: Reported on 7/31/2020 4/18/19   Ying Bradshaw MD   SYMBICORT 160-4.5 MCG/ACT AERO INHALE TWO PUFFS BY MOUTH TWICE A DAY  Patient not taking: Reported on 7/31/2020 4/4/19   Ying Bradshaw MD   Multiple Vitamin (DAILY-NESSA) TABS TAKE ONE TABLET BY MOUTH DAILY  Patient not taking: Reported on 7/31/2020 2/13/19   Ying Bradshaw MD   Insulin Pen Needle (PEN NEEDLES) 31G X 6 MM MISC Inject 1 box into the skin 2 times daily 12/27/18   Ying Bradshaw MD   losartan (COZAAR) 50 MG tablet Take 1 tablet by mouth daily 10/19/18   Ying Bradshaw MD   omeprazole (PRILOSEC) 20 MG delayed release capsule Take 1 capsule by mouth Daily  Patient not taking: Reported on 7/31/2020 3/26/18   Ying Bradshaw MD   urea (CARMOL) 20 % lotion Apply topically as needed.  7/31/17   Maciel Asp, DPM   ferrous fumarate-vitamin c (MOMO-SEQUELS) 65-25 MG TBCR CR tablet Take 1 tablet by mouth daily (with breakfast)  Patient not taking: Reported on 7/31/2020 7/18/17   Andre Jo MD   Multiple Vitamins-Minerals (MULTIVITAMIN WITH MINERALS) tablet Take 1 tablet by mouth daily  Patient not taking: Reported on 7/31/2020 8/24/16   Andre Jo MD   .  Recent Surgical History: None = 0     Assessment     Peak Flow (asthma only)    Predicted:    Personal Best:    PEF    % Predicted    Peak Flow : not applicable = 0    OIK1/TRICIA    FEV1 Predicted        FEV1      FEV1 % Predicted    FVC    IS volume    IBW    FIO2% 2 lpm  SPO2 94  RR 16  Breath Sounds: clear      · Bronchodilator assessment at level  Home meds  · Hyperinflation assessment at level   · Secretion Management assessment at level    ·   · []    Bronchodilator Assessment  BRONCHODILATOR ASSESSMENT SCORE  Score 0 1 2 3 4 5   Breath Sounds   [x]  Patient Baseline []  No Wheeze good aeration []  Faint, scattered wheezing, good aeration []  Expiratory Wheezing and or moderately diminished []  Insp/Exp wheeze and/or very diminished []  Insp/Exp and/ or marked distress   Respiratory Rate   [x]  Patient Baseline []  Less than 20 []  Less than 20 []  20-25 []  Greater than 25 []  Greater than 25   Peak flow % of Pred or PB [x]  NA   []  Greater than 90%  []  81-90% []  71-80% []  Less than or equal to 70%  or unable to perform []  Unable due to Respiratory Distress   Dyspnea re [x]  Patient Baseline []  No SOB []  No SOB []  SOB on exertion []  SOB min activity []  At rest/acute   e FEV% Predicted       [x]  NA []  Above 69%  []  Unable []  Above 60-69%  []  Unable []  Above 50-59%  []  Unable []  Above 35-49%  []  Unable []  Less than 35%  []  Unable                 []  Hyperinflation Assessment  Score 1 2 3   CXR and Breath Sounds   []  Clear []  No atelectasis  Basilar aeration []  Atelectasis or absent basilar breath sounds   Incentive Spirometry Volume  (Per IBW)   []  Greater than or equal to 15ml/Kg []  less than 15ml/Kg []  less than 15ml/Kg   Surgery within last 2 weeks []  None or general   []  Abdominal or thoracic surgery  []  Abdominal or thoracic   Chronic Pulmonary Historyre []  No []  Yes []  Yes     []  Secretion Management Assessment  Score 1 2 3   Bilateral Breath Sounds   []  Occasional Rhonchi []  Scattered Rhonchi []  Course Rhonchi and/or poor aeration   Sputum    []  Small amount of thin secretions []  Moderate amount of viscous secretions []  Copius, Viscious Yellow/ Secretions   CXR as reported by physician []  clear  []  Unavailable []  Infiltrates and/or consolidation  []  Unavailable []  Mucus Plugging and or lobar consolidation  []  Unavailable   Cough []  Strong, productive cough []  Weak productive cough []  No cough or weak non-productive cough

## 2020-08-24 NOTE — CONSULTS
Pharmacy Note - Warfarin Pharmacy to Dose Consult    Juan Antonio Ga is a 76 y.o. female for whom pharmacy has been consulted to manage inpatient warfarin therapy. Consulting Physician: Dr Kamar Cleveland  Reason for Admission: COPD exacerbation/Syncope/Closed fracture of tibia    Warfarin dose prior to admission: 10 mg Thursdays, 5 mg all other days of the week (Follows with Alhaji Cheung)  Warfarin indication: A-fib    Target INR range: 2-3     Past Medical History:   Diagnosis Date    Anemia     Chronic obstructive pulmonary disease ([facility] [address] 3/26/2018    [facility]     GERD (gastroesophageal reflux disease)     Hypertension     Other hyperlipidemia 1/70/7563    Systolic congestive heart failure ([facility] 12/17/2019      Warfarin-induced coagulopathy (HCC) D68.32, T45.515A    Chronic atrial fibrillation I48.20          Recent Labs     08/23/20  1336   INR 3.7     Recent Labs     08/23/20  1336 08/24/20  0531   HGB 10.6* 10.6*   HCT 36.6 37.7    368         Recent Labs     08/23/20  1336 08/24/20  0531   HCT 36.6 37.7    368         Current warfarin drug-drug interactions: azithromycin  Active orders for other anticoagulants: enoxaparin      Date             INR        Dose   8/24/2020        3.7    HOLD      Plan: INR is 3.7 today. HOLD dose tonight. Daily PT/INR is ordered while inpatient. Thank you for the consult. Will continue to follow.   Raenette Hodgkin, RPh  8/24/2020  8:58 AM

## 2020-08-24 NOTE — CONSULTS
[facility] Cardiology   Consult Note             Date:   8/24/2020  Patient name: Chapis Fish  Date of admission:  8/23/2020  1:23 PM  MRN:   [id number]  YOB: 1945    Reason for Admission:  syncope    CHIEF COMPLAINT:  I fell    History Obtained From:  patient    HISTORY OF PRESENT ILLNESS:      The patient is a 76 y.o.  female who is admitted to the hospital for management of recurrent syncope. Patient cannot recall if she loses consciousness but has been having multiple falls over the past several days. She has known systolic heart failure I do not know her ejection fraction. She used to follow with Dr. Everton Engel. She has a history of hypertension diabetes dyslipidemia    Past Medical History:   has a past medical history of Anemia, Chronic obstructive pulmonary disease ([address] Diabetes mellitus ([address] GERD (gastroesophageal reflux disease), Hypertension, Other hyperlipidemia, and Systolic congestive heart failure ([facility] [address] Past Surgical History:   has a past surgical history that includes Hysterectomy; Colonoscopy; Tubal ligation; Tonsillectomy; and [place] tooth extraction. Home Medications:    Prior to Admission medications    Medication Sig Start Date End Date Taking?  Authorizing Provider   furosemide (LASIX) 40 MG tablet 1 tablet    Historical Provider, MD   LANTUS SOLOSTAR 100 UNIT/ML injection pen  6/13/20   Historical Provider, MD   metoprolol succinate (TOPROL XL) 50 MG extended release tablet  6/17/20   Historical Provider, MD   omeprazole (PRILOSEC) 20 MG delayed release capsule 1 capsule 30 minutes before morning meal    Historical Provider, MD   metoprolol succinate (TOPROL XL) 50 MG extended release tablet Take 1 tablet by mouth 2 times daily Take 1 tab PO BID - PER CARDIOLOGY 8/21/20   Carol Onofre MD   metFORMIN (GLUCOPHAGE-XR) 500 MG extended release tablet TAKE ONE TABLET BY MOUTH TWICE A DAY 8/10/20   Carol Onofre MD   ammonium lactate (LAC-HYDRIN) 12 % lotion Apply topically daily. 7/31/20   Ying Bradshaw MD   furosemide (LASIX) 40 MG tablet TAKE ONE TABLET BY MOUTH DAILY 7/9/20   Ying Bradshaw MD   LANTUS SOLOSTAR 100 UNIT/ML injection pen INJECT 80 UNITS UNDER THE SKIN TWO TIMES A DAY 7/2/20   Ying Bradshaw MD   amLODIPine (NORVASC) 10 MG tablet TAKE ONE TABLET BY MOUTH DAILY 6/29/20   Ying Bradshaw MD   warfarin (COUMADIN) 5 MG tablet TAKE ONE TABLET BY MOUTH DAILY AS DIRECTED BY [facility] 6/29/20   Ying Bradshaw MD   potassium chloride (KLOR-CON M) 20 MEQ extended release tablet TAKE THREE TABLETS BY MOUTH DAILY.  PATIENT NEEDS APPOINTMENT 4/22/20   Ying Bradshaw MD   blood glucose test strips (JERZY CONTOUR TEST) strip USE 1 STRIP BY IN VITRO DAILY AS NEEDED 2/3/20   Ying Bradshaw MD   PROAIR  (90 Base) MCG/ACT inhaler INHALE TWO PUFFS BY MOUTH EVERY 6 HOURS AS NEEDED FOR WHEEZING 10/31/19   Ying Bradshaw MD   Vitamin D, Cholecalciferol, 400 units TABS Take 1 tablet by mouth 2 times daily 9/25/19   Ying Bradshaw MD   HUMALOG KWIKPEN 100 UNIT/ML pen INJECT 20 UNITS SUBCUTANEOUSLY DAILY 9/21/19   Ying Bradshaw MD   tiotropium (Rosendo Andrea) 18 MCG inhalation capsule Inhale 1 capsule into the lungs daily  Patient not taking: Reported on 7/31/2020 4/18/19   Ying Bradshaw MD   SYMBICORT 160-4.5 MCG/ACT AERO INHALE TWO PUFFS BY MOUTH TWICE A DAY  Patient not taking: Reported on 7/31/2020 4/4/19   Ying Bradshaw MD   Multiple Vitamin (DAILY-NESSA) TABS TAKE ONE TABLET BY MOUTH DAILY  Patient not taking: Reported on 7/31/2020 2/13/19   Ying Bradshaw MD   Insulin Pen Needle (PEN NEEDLES) 31G X 6 MM MISC Inject 1 box into the skin 2 times daily 12/27/18   Ying Bradshaw MD   losartan (COZAAR) 50 MG tablet Take 1 tablet by mouth daily 10/19/18   Ying Bradshaw MD   omeprazole (PRILOSEC) 20 MG delayed release capsule Take 1 capsule by mouth Daily  Patient not taking: Reported on 7/31/2020 3/26/18   Ying Bradshaw MD   urea (CARMOL) 20 % lotion Apply topically as needed. 7/31/17   Yanira Phoenix, DPM   ferrous fumarate-vitamin c (MOMO-SEQUELS) 65-25 MG TBCR CR tablet Take 1 tablet by mouth daily (with breakfast)  Patient not taking: Reported on 7/31/2020 7/18/17   Maria Luisa Navarro MD   Multiple Vitamins-Minerals (MULTIVITAMIN WITH MINERALS) tablet Take 1 tablet by mouth daily  Patient not taking: Reported on 7/31/2020 8/24/16   [address], MD       Allergies:  Patient has no known allergies. Social History:   reports that she has been smoking cigarettes. She started smoking about 55 years ago. She has a 30.00 pack-year smoking history. She has never used smokeless tobacco. She reports current alcohol use. She reports that she does not use drugs. Family History: family history includes Cancer in her mother; Diabetes in her mother; High Blood Pressure in her mother. REVIEW OF SYSTEMS:    · Very difficult to obtain since patient is a poor historian. PHYSICAL EXAM:    Physical Examination:    BP (!) 140/66   Pulse 89   Temp 97.9 °F (36.6 °C) (Oral)   Resp 16   Ht 5' 7\" (1.702 m)   Wt 223 lb 11.2 oz (101.5 kg)   LMP  (LMP Unknown)   SpO2 93%   BMI 35.04 kg/m²    Constitutional and General Appearance: alert, cooperative, no distress and appears stated age  HEENT: PERRL, no cervical lymphadenopathy. No masses palpable. Normal oral mucosa  Respiratory:  · Normal excursion and expansion without use of accessory muscles  · Resp Auscultation: Good respiratory effort. No for increased work of breathing.  On auscultation: clear to auscultation bilaterally  Cardiovascular:  · The apical impulse is not displaced  · Heart tones are crisp and normal. regular S1 and S2.  · Jugular venous pulsation Normal  · The carotid upstroke is normal in amplitude and contour without delay or bruit  · Peripheral pulses are symmetrical and full   Abdomen:  · No masses or tenderness  · Bowel sounds present  Extremities:  ·  No Cyanosis or Clubbing  ·  Lower extremity edema: No  ·  Skin: Warm and dry  Neurological:  · Alert and oriented. · Moves all extremities well  · No abnormalities of mood, affect, memory, mentation, or behavior are noted    DATA:    Diagnostics:      EKG: normal EKG, normal sinus rhythm, unchanged from previous tracings. ECHO: Pending    Labs:     CBC:   Recent Labs     08/23/20  1336 08/24/20  0531   WBC 15.4* 15.5*   HGB 10.6* 10.6*   HCT 36.6 37.7    368     BMP:   Recent Labs     08/23/20  1336 08/23/20  1811 08/24/20  0531     --  137   K 2.7* 3.2* 3.8   CO2 26  --  24   BUN 8  --  10   CREATININE 0.96*  --  0.73   LABGLOM 57*  --  >60   GLUCOSE 68*  --  312*     BNP: No results for input(s): BNP in the last 72 hours. PT/INR:   Recent Labs     08/23/20  1336   PROTIME 36.5*   INR 3.7     APTT:  Recent Labs     08/23/20  1336   APTT 57.8*     CARDIAC ENZYMES:No results for input(s): CKTOTAL, CKMB, CKMBINDEX, TROPONINI in the last 72 hours. FASTING LIPID PANEL:  Lab Results   Component Value Date    HDL 56 08/24/2020    [address] 83 01/08/2014    TRIG 159 07/31/2020     LIVER PROFILE:  Recent Labs     08/23/20  1336   AST 28   ALT 13   LABALBU 3.0*         IMPRESSION:    Patient Active Problem List   Diagnosis    GERD (gastroesophageal reflux disease)    Hypertension    Vertigo    Iron deficiency anemia due to chronic blood loss    Other hyperlipidemia    [facility] [address]    Influenza vaccination declined    Pneumococcal vaccination declined    Smoker    Uncontrolled type 1 diabetes mellitus with hyperglycemia ([facility] [address] [facility]    Edema of both lower extremities due to peripheral venous insufficiency    Oxygen dependent    Systolic congestive heart failure (HCC)    Warfarin-induced coagulopathy (HCC)    Chronic atrial fibrillation    Syncope and collapse    COPD with acute exacerbation (HCC)    Hypokalemia    Hypomagnesemia       RECOMMENDATIONS:  1.  Recurrent syncope  2. Chronic systolic heart failure  3 Diabetes  4. She has a history of chronic atrial fibrillation but her EKG shows sinus rhythm  Patient had syncope with no clear etiology. Due to the fact that she has developed injury, I will recommend loop recorder implantation to evaluate. Continue warfarin for stroke prophylaxis  Continue heart failure medications  Obtain 2D echocardiogram to evaluate her cardiac function    Discussed with patient and nursing.     Bernard Puga MD  [facility] cardiology

## 2020-08-24 NOTE — PROGRESS NOTES
of Care;Goals;Transfer Training;Functional Mobility Training;Gait Training;General Safety  Patient Education: Ed functional mobility, technique for NWB LLE & use of R/walker, safety awareness, prevention of sedentary complications  REQUIRES PT FOLLOW UP: Yes  Activity Tolerance  Activity Tolerance: Patient limited by pain       Patient Diagnosis(es): The primary encounter diagnosis was Syncope, unspecified syncope type. Diagnoses of Chronic obstructive pulmonary disease with acute exacerbation (HCC) and Closed extra-articular fracture of distal end of left tibia, initial encounter were also pertinent to this visit. has a past medical history of Anemia, Chronic obstructive pulmonary disease ([facility] [address] Diabetes mellitus ([facility] [address] GERD (gastroesophageal reflux disease), Hypertension, Other hyperlipidemia, and Systolic congestive heart failure ([facility] [address] has a past surgical history that includes Hysterectomy; Colonoscopy; Tubal ligation; Tonsillectomy; and [place] tooth extraction.     Restrictions  Restrictions/Precautions  Restrictions/Precautions: Weight Bearing, General Precautions, Fall Risk  Required Braces or Orthoses?: Yes  Lower Extremity Weight Bearing Restrictions  Left Lower Extremity Weight Bearing: Non Weight Bearing  Required Braces or Orthoses  Left Lower Extremity Brace: (posterior splint Left lower leg)  Position Activity Restriction  Other position/activity restrictions: NWB LLE, O2 NC@ 3 L/min, alarms, droplet + to r/o COVID,up as karla per PRAFUL Fuller  Vision/Hearing  Vision: Impaired  Vision Exceptions: Wears glasses for reading  Hearing: Within functional limits     Subjective  General  Patient assessed for rehabilitation services?: Yes  Pain Screening  Patient Currently in Pain: Denies          Orientation  Orientation  Overall Orientation Status: Within Normal Limits  Social/Functional History  Social/Functional History  Lives With: Spouse  Type of Home: Apartment  Home Layout: Two level  Home Access: Stairs to enter with rails  Entrance Stairs - Number of Steps: one step to enter & then 4 from inside  Entrance Stairs - Rails: Right  Bathroom Shower/Tub: Tub/Shower unit  Bathroom Toilet: Standard  Bathroom Equipment: Shower chair  Home Equipment: 4 wheeled walker, Heather Raker Help From: Family(Pt states her family is supportive especially her spouse and 25 yr old grand son.)  ADL Assistance: Independent  Homemaking Assistance: Independent(Pt states her spouse will assist as needed however she is mainly responsible for [place]. )  Homemaking Responsibilities: Yes  Ambulation Assistance: Independent(used rollator)  Transfer Assistance: Independent  Active : No  Mode of Transportation: Family(kids/grandkids)  Occupation: Retired  Type of occupation: cook  Leisure & Hobbies: spending time with grandkids  Additional Comments: Pt has supportive spouse & children/grandkids. Cognition   Cognition  Overall Cognitive Status: Exceptions  Arousal/Alertness: Appropriate responses to stimuli  Following Commands: Follows multistep commands with increased time; Follows multistep commands with repitition  Attention Span: Appears intact  Memory: Decreased short term memory  Safety Judgement: Decreased awareness of need for assistance;Decreased awareness of need for safety  Problem Solving: Assistance required to identify errors made;Assistance required to generate solutions;Assistance required to implement solutions;Assistance required to correct errors made;Decreased awareness of errors  Insights: Decreased awareness of deficits  Initiation: Requires cues for some  Sequencing: Requires cues for some    Objective     Observation/Palpation  Posture: Fair(with RW)  Observation: resting in bed, wearing posterior splint Left lower leg & propped over pillow, O2, [employer].   Pt with good adherence to LLE NWB in function after edu/demo and min verbal instruction/tactile assist.    AROM RLE (degrees)  RLE AROM: WFL  AROM LLE (degrees)  LLE AROM : WFL  LLE General AROM: deferred ankle  AROM RUE (degrees)  RUE General AROM: see OT assessment  AROM LUE (degrees)  LUE General AROM: see OT assessment  Strength LLE  Comment: 4/5 hip/knee  Strength RUE  Comment: see OT assessment  Strength LUE  Comment: see OT assessment  Tone RLE  RLE Tone: Normotonic  Tone LLE  LLE Tone: Normotonic  Motor Control  Gross Motor?: WFL  Sensation  Overall Sensation Status: WFL  Bed mobility  Rolling to Left: Minimal assistance  Supine to Sit: Moderate assistance;2 Person assistance  Scooting: Moderate assistance;2 Person assistance  Transfers  Sit to Stand: Moderate Assistance;2 Person Assistance  Stand to sit: Moderate Assistance;2 Person Assistance  Bed to Chair: Moderate assistance;2 Person Assistance  Stand Pivot Transfers: Moderate Assistance;2 Person Assistance  Lateral Transfers: Moderate Assistance;2 Person Assistance  Comment: Needed Ed + tactile assist for correct hand placement for safe sit/stand  Ambulation  Ambulation?: Yes  WB Status: NWB LLE  Ambulation 1  Surface: level tile  Device: Rolling Walker  Assistance: Moderate assistance;2 Person assistance  Quality of Gait: Ed on hop to pattern, needs side by side assist for safety & control  Distance: 2ft  Stairs/Curb  Stairs?: No     Balance  Sitting - Static: Good  Sitting - Dynamic: Good  Standing - Static: Fair;+(R/W)  Standing - Dynamic: Fair(R/W)  Exercises  Comments: Ed functional mobility, technique for NWB LLE & use of R/walker, safety awareness, prevention of sedentary complications     Plan   Plan  Times per week: 1-2x/D,6-7d/week  Current Treatment Recommendations: Strengthening, Balance Training, Functional Mobility Training, Transfer Training, Endurance Training, Gait Training, Stair training, Home Exercise Program, Safety Education & Training, Patient/Caregiver Education & Training  Safety Devices  Type of devices:  All fall risk precautions in place, Bed alarm in place, Call light within reach, Chair alarm in place, Gait belt, Patient at risk for falls, Left in chair, Nurse notified    G-Code       OutComes Score                                                  AM-PAC Score  AM-PAC Inpatient Mobility Raw Score : 11 (08/24/20 1134)  AM-PAC Inpatient T-Scale Score : 33.86 (08/24/20 1134)  Mobility Inpatient CMS 0-100% Score: 72.57 (08/24/20 1134)  Mobility Inpatient CMS G-Code Modifier : CL (08/24/20 1134)          Goals  Short term goals  Time Frame for Short term goals: 12 visits  Short term goal 1: Inc bed mobility & transfers to indep with adherence to NWB LLE;  Short term goal 2: Inc gait to amb 15ft with R/walker & NWB LLE;  Short term goal 3: Pt able to go up/down 4 steps safely with NWB LLE  Short term goal 4: Pt able to tolerate 30-40 min of activity to include 15-20 reps of ex & functional mobility including 5 minutes of standing to facilitate activity tolerance to [facility]; Short term goal 5: Inc strength to [facility] & standing balance to good with device & mainenance of LLE NWB to facilitate pt independence for performance of ADL's & functional mobility, & reduce fall risk; Short term goal 6: Ed pt on home ex's, safety & energy principles & issue written home program;       Therapy Time   Individual Concurrent Group Co-treatment   Time In       1104   Time Out       1134   Minutes       30+10=40        Co-treatment with OT warranted secondary to decreased safety and independence requiring 2 skilled therapy professionals to address individual discipline's goals. PT addressing pre gait trunk strengthening, weight shifting prior to transfers, transfer training and postural control in sitting.   Additional 10 minutes for chart review          [address], PT

## 2020-08-24 NOTE — CARE COORDINATION
Case Management Initial Discharge Plan  Therese Adameshree,         Readmission Risk              Risk of Unplanned Readmission:        17             Met with:patient to discuss discharge plans. Information verified: address, contacts, phone number, , insurance Yes  PCP: Payal Killian MD  Date of last visit:     Insurance Provider: [facility] medicare     Discharge Planning  Current Residence:  [address]   Living Arrangements:  Spouse/Significant Other       Home is an apt. She has 1 step to enter the building and then 4 steps up to get to her floor where her apt is located. No elevator     Support Systems:  Spouse/Significant Other, Children, Family Members       Current Services PTA:  Home 02 on exertion Agency: [facility]       Patient able to perform ADL's:Assisted  DME in home:  02 ,walker, cane, shower chair    DME used to aid ambulation prior to admission:   Myrtle Buys   DME used during admission:  TBD     Potential Assistance Needed:  [address]: [place] [address]    Potential Assistance Purchasing Medications:  No  Does patient want to participate in local refill/ meds to beds program?  No    Patient agreeable to home care: Yes  Freedom of choice provided:  yes      Type of Home Care Services:  None  Patient expects to be discharged to:  Home    Prior SNF/Rehab Placement and Facility: none   Agreeable to SNF/Rehab: No  [place] of choice provided: n/a   Evaluation: n/a    Expected Discharge date:  20  Follow Up Appointment: Best Day/ Time: Monday AM    Transportation provider: depends on POC  Transportation arrangements needed for discharge: No    Discharge Plan:   Patient lives at home with her spouse. Her daughter Shivani Castaneda also helps. Patient has been admitted s/p fall and hypoxia . Her sats were quite low on arrival ( 83%)  She states she has home 02 thru [facility] but wears it only when she wants. Call to [facility] to verify her rx. They verified that her rx is for 3 liters all the time. Patient also goes to [facility] anticoagulation clinic  For her a fib. Her current dosing is 10 mg every Thursday and then 5 mg every other day. .her last visit  was  8/6    Patient also has a closed non displaced fx of distal fibula of the left ankle. She will be NWB on discharge and has total of 5 steps to get into her apt. Discussed with patient potential for home care vs snf. She stated she has had home care in the past but unsure of agency. She stated her daughter Michelle Banks may know. She is also a rule out COVID . Await results of covid and therapy evaluation to assist in determining final poc.      Electronically signed by Jarad Baird RN on 8/24/20 at 2:15 PM EDT

## 2020-08-24 NOTE — CONSULTS
Consultation Note  Podiatric Medicine and Surgery     Subjective     Chief Complaint: Ankle pain, left     HPI:  Juan Antonio Ga is a 76 y.o. female seen at [facility] for closed nondisplaced fracture of distal fibula, left ankle. Patient is admitted to the hospital for work-up of syncopal episodes and multiple falls with possible pneumonia. Patient is a poor historian. Patient states that she falls on a regular basis. Patient states she cannot recall if she loses consciousness. She denies hitting her head. She relates that she has fallen multiple times over the past several days at some point she noted increasing pain to her left ankle. She states the pain progressed to the point of the inability to bear weight on the left lower extremity. She denies any previous trauma or surgical intervention of the left ankle. X-rays left ankle were obtained in the ED which demonstrated a closed nondisplaced fracture of the distal fibula. Patient admits to paresthesias bilateral lower extremity. PCP is Kamar Cleveland MD    ROS:   Review of Systems   Constitutional: Negative for chills and fever. HENT: Negative for sinus pain and sore throat. Eyes: Negative for pain. Respiratory: Positive for cough and shortness of breath. Cardiovascular: Negative for chest pain. Gastrointestinal: Negative for abdominal pain, diarrhea, nausea and vomiting. Musculoskeletal: Positive for arthralgias, joint swelling and myalgias. Skin: Negative for wound. Neurological: Positive for syncope, weakness and numbness. Past Medical History   has a past medical history of Anemia, Chronic obstructive pulmonary disease ([address] Diabetes mellitus ([address] GERD (gastroesophageal reflux disease), Hypertension, Other hyperlipidemia, and Systolic congestive heart failure ([address] Past Surgical History   has a past surgical history that includes Hysterectomy; Colonoscopy; Tubal ligation;  Tonsillectomy; and [place] tooth extraction. Medications  Prior to Admission medications    Medication Sig Start Date End Date Taking? Authorizing Provider   furosemide (LASIX) 40 MG tablet 1 tablet    Historical Provider, MD   LANTUS SOLOSTAR 100 UNIT/ML injection pen  6/13/20   Historical Provider, MD   metoprolol succinate (TOPROL XL) 50 MG extended release tablet  6/17/20   Historical Provider, MD   omeprazole (PRILOSEC) 20 MG delayed release capsule 1 capsule 30 minutes before morning meal    Historical Provider, MD   metoprolol succinate (TOPROL XL) 50 MG extended release tablet Take 1 tablet by mouth 2 times daily Take 1 tab PO BID - PER CARDIOLOGY 8/21/20   Bhumi Colindres MD   metFORMIN (GLUCOPHAGE-XR) 500 MG extended release tablet TAKE ONE TABLET BY MOUTH TWICE A DAY 8/10/20   Bhumi Colindres MD   ammonium lactate (LAC-HYDRIN) 12 % lotion Apply topically daily. 7/31/20   Bhumi Colindres MD   furosemide (LASIX) 40 MG tablet TAKE ONE TABLET BY MOUTH DAILY 7/9/20   MD CYN Wright SOLOSTAR 100 UNIT/ML injection pen INJECT 80 UNITS UNDER THE SKIN TWO TIMES A DAY 7/2/20   Bhumi Colindres MD   amLODIPine (NORVASC) 10 MG tablet TAKE ONE TABLET BY MOUTH DAILY 6/29/20   Bhumi Colnidres MD   warfarin (COUMADIN) 5 MG tablet TAKE ONE TABLET BY MOUTH DAILY AS DIRECTED BY Located within [facility] 6/29/20   Bhumi Colindres MD   potassium chloride (KLOR-CON M) 20 MEQ extended release tablet TAKE THREE TABLETS BY MOUTH DAILY.  PATIENT NEEDS APPOINTMENT 4/22/20   Bhumi Colindres MD   blood glucose test strips (JERZY CONTOUR TEST) strip USE 1 STRIP BY IN VITRO DAILY AS NEEDED 2/3/20   Bhumi Colindres MD   PROAIR  (49 Base) MCG/ACT inhaler INHALE TWO PUFFS BY MOUTH EVERY 6 HOURS AS NEEDED FOR WHEEZING 10/31/19   Bhumi Colindres MD   Vitamin D, Cholecalciferol, 400 units TABS Take 1 tablet by mouth 2 times daily 9/25/19   Bhumi Colindres MD   HUMALOG KWIKPEN 100 UNIT/ML pen INJECT 20 UNITS SUBCUTANEOUSLY DAILY 9/21/19   Bhumi Colindres MD   tiotropium (Izzy Lyman) 18 MCG inhalation capsule Inhale 1 capsule into the lungs daily  Patient not taking: Reported on 7/31/2020 4/18/19   Baron Kelly MD   SYMBICORT 160-4.5 MCG/ACT AERO INHALE TWO PUFFS BY MOUTH TWICE A DAY  Patient not taking: Reported on 7/31/2020 4/4/19   Baron Kelly MD   Multiple Vitamin (DAILY-NESSA) TABS TAKE ONE TABLET BY MOUTH DAILY  Patient not taking: Reported on 7/31/2020 2/13/19   Baron Kelly MD   Insulin Pen Needle (PEN NEEDLES) 31G X 6 MM MISC Inject 1 box into the skin 2 times daily 12/27/18   Baron Kelly MD   losartan (COZAAR) 50 MG tablet Take 1 tablet by mouth daily 10/19/18   Baron Kelly MD   omeprazole (PRILOSEC) 20 MG delayed release capsule Take 1 capsule by mouth Daily  Patient not taking: Reported on 7/31/2020 3/26/18   Baron Kelly MD   urea (CARMOL) 20 % lotion Apply topically as needed. 7/31/17   Bassem Gu DPM   ferrous fumarate-vitamin c (MOMO-SEQUELS) 65-25 MG TBCR CR tablet Take 1 tablet by mouth daily (with breakfast)  Patient not taking: Reported on 7/31/2020 7/18/17   Margaret Lyle MD   Multiple Vitamins-Minerals (MULTIVITAMIN WITH MINERALS) tablet Take 1 tablet by mouth daily  Patient not taking: Reported on 7/31/2020 8/24/16   Nandini Mckeon MD    Scheduled Meds:  Princess Dietz insulin lispro  0-12 Units Subcutaneous TID WC    insulin lispro  0-6 Units Subcutaneous Nightly    warfarin (COUMADIN) daily dosing (placeholder)   Other RX Placeholder    sodium chloride flush  10 mL Intravenous 2 times per day    azithromycin  500 mg Oral Daily    metoprolol succinate  50 mg Oral BID     Continuous Infusions:   dextrose       PRN Meds:.glucose, dextrose, glucagon (rDNA), dextrose, magnesium sulfate, sodium chloride flush, acetaminophen, potassium chloride **OR** potassium alternative oral replacement **OR** potassium chloride, albuterol    Allergies  has No Known Allergies.     Family History  family history includes Cancer in her mother; Diabetes in her mother; High Blood Pressure in her mother. Social History   reports that she has been smoking cigarettes. She started smoking about 55 years ago. She has a 30.00 pack-year smoking history. She has never used smokeless tobacco.   reports current alcohol use. reports no history of drug use. Objective     Vitals:  Patient Vitals for the past 8 hrs:   BP Temp Temp src Pulse Resp SpO2   20 1213 135/60 97.7 °F (36.5 °C) Oral 88 16 94 %   20 0727 (!) 146/68 98.1 °F (36.7 °C) Oral 89 16 92 %   20 0445 (!) 144/74 98.4 °F (36.9 °C) -- 78 16 94 %     Average, Min, and Max for last 24 hours Vitals:  TEMPERATURE:  Temp  Av.3 °F (36.8 °C)  Min: 97.7 °F (36.5 °C)  Max: 98.7 °F (37.1 °C)    RESPIRATIONS RANGE: Resp  Av.6  Min: 16  Max: 24    PULSE RANGE: Pulse  Av.4  Min: 78  Max: 114    BLOOD PRESSURE RANGE:  Systolic (13QEY), GHO:960 , Min:135 , WEJ:671   ; Diastolic (85MYA), EDDIE:02, Min:60, Max:78      PULSE OXIMETRY RANGE: SpO2  Av.2 %  Min: 83 %  Max: 100 %  I&O:  I/O last 3 completed shifts: In: 358 [P.O.:358]  Out: -     CBC:  Recent Labs     20  1336 20  0531   WBC 15.4* 15.5*   HGB 10.6* 10.6*   HCT 36.6 37.7    368   CRP 39.5*  --         BMP:  Recent Labs     20  1336 20  1811 20  0531     --  137   K 2.7* 3.2* 3.8     --  101   CO2 26  --  24   BUN 8  --  10   CREATININE 0.96*  --  0.73   GLUCOSE 68*  --  312*   CALCIUM 8.6  --  8.3*        Coags:  Recent Labs     20   APTT 57.8*   PROT 8.5*   INR 3.7       Lab Results   Component Value Date    LABA1C 7.7 (H) 2020     No results found for: SEDRATE  Lab Results   Component Value Date    CRP 39.5 (H) 2020         Physical Exam:    Vascular: DP and PT pulses are weakly palpable, bilateral. CFT brisk to all digits. Neuro: Saph/sural/SP/DP/plantar sensation diminished to light touch.     Musculoskeletal: Muscle strength testing deferred due to post injury state. Gross deformity is absent at the level of the ankle. Ecchymosis is present over  lateral malleolus. Mild beatriz-malleolar edema is noted about the ankle joint. There is no skin tenting. There is moderate pain with palpation of the lateral aspect of the fibula. No further pain is noted there is no pain to the syndesmosis. There is mild pain elicited with external rotation and range of motion testing of the ankle. There is no pain to palpation of the fifth metatarsal base, medial aspect of the midfoot, or anterior process calcaneus. Comments are soft compressible. There is no pain with compression of calf. Dermatologic: No fractures blisters. No open fractures or other wounds noted, bilateral.  Other dermatologic findings noted above. Imaging:   XR CHEST PORTABLE   Final Result   1. Cardiomegaly with pulmonary edema. 2. Low lung volumes with bibasilar atelectasis. XR ANKLE LEFT (MIN 3 VIEWS)   Final Result   Nondisplaced fracture of the distal fibula             Assessment     Daquan Wong is a 76 y.o. female with   1. Closed nondisplaced fracture of distal fibula, left ankle  2. COPD  3. Diabetes type 1 with associated peripheral neuropathy    Principal Problem:    Syncope and collapse  Active Problems:    Uncontrolled type 1 diabetes mellitus with hyperglycemia (HCC)    Oxygen dependent    Systolic congestive heart failure (HCC)    Warfarin-induced coagulopathy (HCC)    Chronic atrial fibrillation    COPD with acute exacerbation (HCC)    Hypokalemia    Hypomagnesemia  Resolved Problems:    * No resolved hospital problems. *        Plan     · Patient examined and evaluated at bedside   · Treatment options discussed in detail with the patient  · Radiographs left ankle reviewed and discussed in detail with patient--closed nondisplaced fracture distal fibula, no overt dislocations, other osseous normalities.   · Educated patient on the utmost importance of nonweightbearing to the left lower extremity to him ensure best potential outcome for healing and function of the left ankle. · No surgical intervention warranted at this time. This is a isolated fibular fracture therefore the stability of the ankle joint is maintained and we will pursue conservative treatment at this time. Podiatry will sign off. Thank you for the consultation. Please reconsult or perfect serve should any concerns arise. · New short leg posterior splint applied to left lower extremity. Dressing to stay intact until follow-up on outpatient basis. · Strict nonweightbearing to left lower extremity. Recommend PT and OT evaluation recommendation for nonweightbearing status. · Patient to follow-up with Dr. Mendel Gee within 1 week of discharge.             Modesta Schreiber DPM   Podiatric Medicine & Surgery   8/24/2020 at 12:16 PM

## 2020-08-24 NOTE — PROGRESS NOTES
10 MG tablet, TAKE ONE TABLET BY MOUTH DAILY  warfarin (COUMADIN) 5 MG tablet, TAKE ONE TABLET BY MOUTH DAILY AS DIRECTED BY [facility]ICO[facility]ATION [facility] (Patient taking differently: 10mg on Thursday and 5mg on all other days)  potassium chloride (KLOR-CON M) 20 MEQ extended release tablet, TAKE THREE TABLETS BY MOUTH DAILY.  PATIENT NEEDS APPOINTMENT  blood glucose test strips (JERZY CONTOUR TEST) strip, USE 1 STRIP BY IN VITRO DAILY AS NEEDED  PROAIR  (90 Base) MCG/ACT inhaler, INHALE TWO PUFFS BY MOUTH EVERY 6 HOURS AS NEEDED FOR WHEEZING  Vitamin D, Cholecalciferol, 400 units TABS, Take 1 tablet by mouth 2 times daily  HUMALOG KWIKPEN 100 UNIT/ML pen, INJECT 20 UNITS SUBCUTANEOUSLY DAILY  Multiple Vitamin (DAILY-NESSA) TABS, TAKE ONE TABLET BY MOUTH DAILY  Insulin Pen Needle (PEN NEEDLES) 31G X 6 MM MISC, Inject 1 box into the skin 2 times daily

## 2020-08-24 NOTE — FLOWSHEET NOTE
Patient in isolation; no family present. Writer prays for patient in hallway. Spiritual Care will follow as needed.      08/24/20 1048   Encounter Summary   Services provided to: Patient   Referral/Consult From: Kely   Continue Visiting   (8/24/20 COVID+)   Complexity of Encounter Low   Length of Encounter 15 minutes   Routine   Type Initial   Assessment Unable to respond   Intervention Prayer

## 2020-08-24 NOTE — PROGRESS NOTES
diagnosis was Syncope, unspecified syncope type. Diagnoses of Chronic obstructive pulmonary disease with acute exacerbation (HCC) and Closed extra-articular fracture of distal end of left tibia, initial encounter were also pertinent to this visit. has a past medical history of Anemia, Chronic obstructive pulmonary disease ([address] Diabetes mellitus ([address] GERD (gastroesophageal reflux disease), Hypertension, Other hyperlipidemia, and Systolic congestive heart failure ([address] has a past surgical history that includes Hysterectomy; Colonoscopy; Tubal ligation; Tonsillectomy; and [place] tooth extraction. PER ED notes: 80-year-old female with history of COPD and diabetes presents emergency room for recurrent falls and pain to the left ankle. Patient is a poor historian; she is at times describing possible brief syncope with these falls however at other times states that she does not pass out. Patient states that this has been intermittent over the last several weeks and seems to occur with standing. Patient is here with her daughter who states that when she fell today she started having a lot of pain in the left ankle. Patient's oxygen was 83% on room air when she initially came in. Patient states that she was on oxygen long-term but has had some improvement in her hypoxia and it no longer is using the home oxygen on a consistent basis. She does report increased cough but is unsure as to how long this is been ongoing. She has not had any fevers at home that she is aware of.       Restrictions  Restrictions/Precautions  Restrictions/Precautions: Weight Bearing, General Precautions, Fall Risk  Required Braces or Orthoses?: Yes  Lower Extremity Weight Bearing Restrictions  Left Lower Extremity Weight Bearing: Non Weight Bearing  Required Braces or Orthoses  Left Lower Extremity Brace: (posterior splint Left lower leg)  Position Activity Restriction  Other position/activity restrictions: NWB LLE due to nondisplaced fx of dital fibula, O2 NC@ 3 L/min, alarms, droplet + to r/o COVID, up as karla per [employer]     Subjective   General  Chart Reviewed: Yes  Patient assessed for rehabilitation services?: Yes  Family / Caregiver Present: No  Patient Currently in Pain: Denies  Pain Assessment  Pain Assessment: 0-10  Pain Level: 0  Pre Treatment Pain Screening  Comments / Details: Pt states she has intermittent LLE painl; RN in room and aware. Social/Functional History  Social/Functional History  Lives With: Spouse  Type of Home: Apartment  Home Layout: Two level  Home Access: Stairs to enter with rails  Entrance Stairs - Number of Steps: one step to enter & then 4 from inside  Entrance Stairs - Rails: Right  Bathroom Shower/Tub: Tub/Shower unit  Bathroom Toilet: Standard  Bathroom Equipment: Shower chair  Home Equipment: 4 wheeled walker, [employer] Help From: Family(Pt states her family is supportive especially her spouse and 25 yr old grand son.)  ADL Assistance: Independent  Homemaking Assistance: Independent(Pt states her spouse will assist as needed however she is mainly responsible for IADLS. )  Homemaking Responsibilities: Yes  Ambulation Assistance: Independent(used rollator)  Transfer Assistance: Independent  Active : No  Mode of Transportation: Family(kids/grandkids)  Occupation: Retired  Type of occupation: cook  Leisure & Hobbies: spending time with grandkids  Additional Comments: Pt has supportive spouse & children/grandkids. Objective   Vision: Impaired(Pt states she has intermittent blurry vision)  Vision Exceptions: Wears glasses for reading  Hearing: Within functional limits    Orientation  Overall Orientation Status: Within Functional Limits  Observation/Palpation  Posture: Fair(with RW)  Observation: resting in bed, wearing posterior splint Left lower leg & propped over pillow, O2, Kim@[employer].   Pt with good adherence to LLE NWB in function after OT edu/demo and min verbal instruction/tactile assist.  Balance  Sitting Balance: Stand by assistance  Standing Balance: Moderate assistance(x2 for safety/balance and adherence to LLE NWB.)  Standing Balance  Time: stand karla< 30 seconds with RW for functional tasks  Functional Mobility  Functional - Mobility Device: Rolling Walker  Activity: (bed to bedside chair)  Assist Level: Moderate assistance(x2 for safety/balance)  Functional Mobility Comments: MOD verbal instruction/tactile asist for weight shifting, LLE NWB and importance of adherence to MD orders/WB status, extending BUE's on AD for increased support, RW safety and awareness/assist with lines to increase overall safety/balance. Toilet Transfers  Toilet Transfers Comments: N/T and pt with no needs. ADL  Feeding: Setup  Grooming: Setup;Stand by assistance(seated)  UE Bathing: Setup;Stand by assistance  LE Bathing: Setup;Dependent/Total  UE Dressing: Setup;Minimal assistance(with donning hosp gown)  LE Dressing: Setup;Dependent/Total(pt was able to rosey R sock with set up/SBA long sitting in bed.)  Toileting: Setup;Dependent/Total  Additional Comments: *Pt is DEP at this time with 2 staff assist when up with RW for safety/balance and adherence to LLE NWB. Tone RUE  RUE Tone: Normotonic  Tone LUE  LUE Tone: Normotonic  Coordination  Coordination and Movement description: Fine motor impairments     Bed mobility  Rolling to Left: Minimal assistance  Supine to Sit: Moderate assistance;2 Person assistance  Sit to Supine: Unable to assess(Pt agreed to sit up in chair)  Comment: MOD verbal instruction/tactile assist for hand placement on bed rail and pursed lip breathing. Transfers  Stand Step Transfers: Moderate assistance;2 Person assistance(for safety/balance and use of RW)  Sit to stand:  Moderate assistance;2 Person assistance  Stand to sit: Moderate assistance;2 Person assistance  Transfer Comments: MOD verbal instruction/tactile assist for B hand placement, RW safety, weight functional reaching, environmental safety/scanning, fall prevention, functional mobility for ADL transfers, ability to sequence and follow directions and functional UE strength. Goals  Short term goals  Time Frame for Short term goals: by discharge, pt to demo  Short term goal 1: all bed mob tasks with use of rail as needed to min assist/CG. Short term goal 2: increase in BUE strength by a 1/2 grade to assist with self care and be I with BUE HEP with use of hand outs as needed. Short term goal 3: UB ADL to set up and LB ADL to mod assist of 1 with use of AD/AE as needed. Short term goal 4: toileting tasks with use of BSC/AD and grab bar as needed to mod assist of 1. Short term goal 5: ADL transfers and functional mob with AD as needed to min assist of 1 and good adherence to LLE NWB. Long term goals  Long term goal 1: Pt to stand with CG and AD karla > 4 mins as able to reduce falls with self care. Long term goal 2: Pt to be I with EC/WS and fall preventjon tech as well as DME/AE and AD recommendations with use of hand outs as needed. Patient Goals   Patient goals : Get home soon!        Therapy Time   Individual Concurrent Group Co-treatment   Time In 1104(plus 10 min chart review/RN communication)         Time Out  [place], [place]

## 2020-08-25 ENCOUNTER — APPOINTMENT (OUTPATIENT)
Dept: GENERAL RADIOLOGY | Age: 75
DRG: 261 | End: 2020-08-25
Payer: MEDICARE

## 2020-08-25 LAB
ABSOLUTE EOS #: 0 K/UL (ref 0–0.4)
ABSOLUTE IMMATURE GRANULOCYTE: 0.15 K/UL (ref 0–0.3)
ABSOLUTE LYMPH #: 1.98 K/UL (ref 1–4.8)
ABSOLUTE MONO #: 0.91 K/UL (ref 0.2–0.8)
ANION GAP SERPL CALCULATED.3IONS-SCNC: 9 MMOL/L (ref 9–17)
BASOPHILS # BLD: 0 %
BASOPHILS ABSOLUTE: 0 K/UL (ref 0–0.2)
BUN BLDV-MCNC: 18 MG/DL (ref 8–23)
BUN/CREAT BLD: 21 (ref 9–20)
CALCIUM SERPL-MCNC: 8.5 MG/DL (ref 8.6–10.4)
CHLORIDE BLD-SCNC: 104 MMOL/L (ref 98–107)
CO2: 27 MMOL/L (ref 20–31)
CREAT SERPL-MCNC: 0.86 MG/DL (ref 0.5–0.9)
CULTURE: ABNORMAL
DIFFERENTIAL TYPE: ABNORMAL
EOSINOPHILS RELATIVE PERCENT: 0 % (ref 1–4)
GFR AFRICAN AMERICAN: >60 ML/MIN
GFR NON-AFRICAN AMERICAN: >60 ML/MIN
GFR SERPL CREATININE-BSD FRML MDRD: ABNORMAL ML/MIN/{1.73_M2}
GFR SERPL CREATININE-BSD FRML MDRD: ABNORMAL ML/MIN/{1.73_M2}
GLUCOSE BLD-MCNC: 135 MG/DL (ref 65–105)
GLUCOSE BLD-MCNC: 146 MG/DL (ref 70–99)
GLUCOSE BLD-MCNC: 151 MG/DL (ref 65–105)
GLUCOSE BLD-MCNC: 159 MG/DL (ref 65–105)
GLUCOSE BLD-MCNC: 196 MG/DL (ref 65–105)
HCT VFR BLD CALC: 34.8 % (ref 36.3–47.1)
HEMOGLOBIN: 9.8 G/DL (ref 11.9–15.1)
IMMATURE GRANULOCYTES: 1 %
INR BLD: 3.4
LYMPHOCYTES # BLD: 13 % (ref 24–44)
Lab: ABNORMAL
MAGNESIUM: 2.1 MG/DL (ref 1.6–2.6)
MCH RBC QN AUTO: 23.3 PG (ref 25.2–33.5)
MCHC RBC AUTO-ENTMCNC: 28.2 G/DL (ref 28.4–34.8)
MCV RBC AUTO: 82.7 FL (ref 82.6–102.9)
MONOCYTES # BLD: 6 % (ref 1–7)
MORPHOLOGY: ABNORMAL
MORPHOLOGY: ABNORMAL
NRBC AUTOMATED: 0.1 PER 100 WBC
PDW BLD-RTO: 18.8 % (ref 11.8–14.4)
PLATELET # BLD: 379 K/UL (ref 138–453)
PLATELET ESTIMATE: ABNORMAL
PMV BLD AUTO: 9.8 FL (ref 8.1–13.5)
POTASSIUM SERPL-SCNC: 3.4 MMOL/L (ref 3.7–5.3)
PROTHROMBIN TIME: 34.2 SEC (ref 11.5–14.2)
RBC # BLD: 4.21 M/UL (ref 3.95–5.11)
RBC # BLD: ABNORMAL 10*6/UL
SARS-COV-2, NAA: NOT DETECTED
SEG NEUTROPHILS: 80 % (ref 36–66)
SEGMENTED NEUTROPHILS ABSOLUTE COUNT: 12.16 K/UL (ref 1.8–7.7)
SODIUM BLD-SCNC: 140 MMOL/L (ref 135–144)
SPECIMEN DESCRIPTION: ABNORMAL
WBC # BLD: 15.2 K/UL (ref 3.5–11.3)
WBC # BLD: ABNORMAL 10*3/UL

## 2020-08-25 PROCEDURE — 94640 AIRWAY INHALATION TREATMENT: CPT

## 2020-08-25 PROCEDURE — 97530 THERAPEUTIC ACTIVITIES: CPT

## 2020-08-25 PROCEDURE — 80048 BASIC METABOLIC PNL TOTAL CA: CPT

## 2020-08-25 PROCEDURE — 6370000000 HC RX 637 (ALT 250 FOR IP): Performed by: FAMILY MEDICINE

## 2020-08-25 PROCEDURE — 97110 THERAPEUTIC EXERCISES: CPT

## 2020-08-25 PROCEDURE — 6360000002 HC RX W HCPCS: Performed by: FAMILY MEDICINE

## 2020-08-25 PROCEDURE — 2580000003 HC RX 258: Performed by: FAMILY MEDICINE

## 2020-08-25 PROCEDURE — 85610 PROTHROMBIN TIME: CPT

## 2020-08-25 PROCEDURE — 83735 ASSAY OF MAGNESIUM: CPT

## 2020-08-25 PROCEDURE — 97535 SELF CARE MNGMENT TRAINING: CPT

## 2020-08-25 PROCEDURE — 82947 ASSAY GLUCOSE BLOOD QUANT: CPT

## 2020-08-25 PROCEDURE — 2580000003 HC RX 258: Performed by: EMERGENCY MEDICINE

## 2020-08-25 PROCEDURE — 85025 COMPLETE CBC W/AUTO DIFF WBC: CPT

## 2020-08-25 PROCEDURE — 36415 COLL VENOUS BLD VENIPUNCTURE: CPT

## 2020-08-25 PROCEDURE — 99233 SBSQ HOSP IP/OBS HIGH 50: CPT | Performed by: FAMILY MEDICINE

## 2020-08-25 PROCEDURE — 2060000000 HC ICU INTERMEDIATE R&B

## 2020-08-25 PROCEDURE — 97116 GAIT TRAINING THERAPY: CPT

## 2020-08-25 RX ADMIN — PANTOPRAZOLE SODIUM 20 MG: 20 TABLET, DELAYED RELEASE ORAL at 06:01

## 2020-08-25 RX ADMIN — METOPROLOL SUCCINATE 50 MG: 50 TABLET, EXTENDED RELEASE ORAL at 09:01

## 2020-08-25 RX ADMIN — POTASSIUM CHLORIDE 40 MEQ: 20 TABLET, EXTENDED RELEASE ORAL at 14:05

## 2020-08-25 RX ADMIN — METFORMIN HYDROCHLORIDE 500 MG: 500 TABLET, EXTENDED RELEASE ORAL at 09:01

## 2020-08-25 RX ADMIN — INSULIN LISPRO 1 UNITS: 100 INJECTION, SOLUTION INTRAVENOUS; SUBCUTANEOUS at 22:43

## 2020-08-25 RX ADMIN — METOPROLOL SUCCINATE 50 MG: 50 TABLET, EXTENDED RELEASE ORAL at 22:42

## 2020-08-25 RX ADMIN — TIOTROPIUM BROMIDE INHALATION SPRAY 2 PUFF: 3.12 SPRAY, METERED RESPIRATORY (INHALATION) at 09:45

## 2020-08-25 RX ADMIN — Medication 10 ML: at 22:44

## 2020-08-25 RX ADMIN — AMLODIPINE BESYLATE 10 MG: 10 TABLET ORAL at 09:01

## 2020-08-25 RX ADMIN — METFORMIN HYDROCHLORIDE 500 MG: 500 TABLET, EXTENDED RELEASE ORAL at 22:43

## 2020-08-25 RX ADMIN — FUROSEMIDE 40 MG: 40 TABLET ORAL at 09:01

## 2020-08-25 RX ADMIN — INSULIN LISPRO 2 UNITS: 100 INJECTION, SOLUTION INTRAVENOUS; SUBCUTANEOUS at 12:30

## 2020-08-25 RX ADMIN — AZITHROMYCIN MONOHYDRATE 500 MG: 500 INJECTION, POWDER, LYOPHILIZED, FOR SOLUTION INTRAVENOUS at 09:55

## 2020-08-25 RX ADMIN — BUDESONIDE AND FORMOTEROL FUMARATE DIHYDRATE 2 PUFF: 160; 4.5 AEROSOL RESPIRATORY (INHALATION) at 19:47

## 2020-08-25 RX ADMIN — BUDESONIDE AND FORMOTEROL FUMARATE DIHYDRATE 2 PUFF: 160; 4.5 AEROSOL RESPIRATORY (INHALATION) at 09:39

## 2020-08-25 RX ADMIN — INSULIN GLARGINE 60 UNITS: 100 INJECTION, SOLUTION SUBCUTANEOUS at 22:43

## 2020-08-25 RX ADMIN — INSULIN LISPRO 2 UNITS: 100 INJECTION, SOLUTION INTRAVENOUS; SUBCUTANEOUS at 18:07

## 2020-08-25 RX ADMIN — CEFTRIAXONE SODIUM 1 G: 1 INJECTION, POWDER, FOR SOLUTION INTRAMUSCULAR; INTRAVENOUS at 09:01

## 2020-08-25 ASSESSMENT — PAIN SCALES - GENERAL
PAINLEVEL_OUTOF10: 0

## 2020-08-25 NOTE — PROGRESS NOTES
metFORMIN  500 mg Oral BID    budesonide-formoterol  2 puff Inhalation BID    tiotropium  2 puff Inhalation Daily    pantoprazole  20 mg Oral QAM AC    insulin glargine  60 Units Subcutaneous Nightly    nicotine  1 patch Transdermal Daily    sodium chloride flush  10 mL Intravenous 2 times per day    metoprolol succinate  50 mg Oral BID       IV Infusions (if any):   dextrose         Diagnostics:   Telemetry: Sinus      Labs:   CBC:   Recent Labs     08/24/20  0531 08/25/20  0611   WBC 15.5* 15.2*   HGB 10.6* 9.8*   HCT 37.7 34.8*    379     BMP:   Recent Labs     08/24/20  0531 08/25/20  0611    140   K 3.8 3.4*   CO2 24 27   BUN 10 18   CREATININE 0.73 0.86   LABGLOM >60 >60   GLUCOSE 312* 146*     BNP: No results for input(s): BNP in the last 72 hours. PT/INR:   Recent Labs     08/23/20  1336 08/25/20  0611   PROTIME 36.5* 34.2*   INR 3.7 3.4     APTT:  Recent Labs     08/23/20  1336   APTT 57.8*     CARDIAC ENZYMES:No results for input(s): CKTOTAL, CKMB, CKMBINDEX, TROPONINI in the last 72 hours.   FASTING LIPID PANEL:  Lab Results   Component Value Date    HDL 56 08/24/2020    [address] 83 01/08/2014    TRIG 159 07/31/2020     LIVER PROFILE:  Recent Labs     08/23/20  1336   AST 28   ALT 13   LABALBU 3.0*       ASSESSMENT:    Patient Active Problem List   Diagnosis    GERD (gastroesophageal reflux disease)    Hypertension    Vertigo    Iron deficiency anemia due to chronic blood loss    Other hyperlipidemia    Chronic obstructive [facility] disease [facility]    Influenza vaccination declined    Pneumococcal vaccination declined    Smoker    Uncontrolled type 1 diabetes mellitus with hyperglycemia ([facility]    Edema of both lower extremities due to peripheral venous insufficiency    Oxygen dependent    Systolic congestive heart failure (HCC)    Warfarin-induced coagulopathy (HCC)    Chronic atrial fibrillation    Syncope and collapse    COPD with acute exacerbation ([facility]    Hypokalemia    Hypomagnesemia       PLAN:    1. Syncope. Await echocardiogram results. 2. Atrial fibrillation. No episodes of atrial fibrillation. Continue warfarin for stroke prophylaxis  3. Patient will need outpatient event recorder or loop implant to evaluate syncope and atrial fibrillation. Please see orders. Discussed with patient and nursing.     Serafin Farmer MD

## 2020-08-25 NOTE — PLAN OF CARE
Problem: Falls - Risk of:  Goal: Will remain free from falls  Description: Will remain free from falls. Fall risk assessment completed. Patient instructed to use call light. Bed locked and in lowest position, side rails up 2/4, call light and bedside table within reach, clutter removed, and non-skid footwear on when pt out of bed. Hourly rounds will continue. Bed/chair alarm in use.    Outcome: Ongoing     Problem: Breathing Pattern - Ineffective:  Goal: Ability to achieve and maintain a regular respiratory rate will improve  Description: Ability to achieve and maintain a regular respiratory rate will improve  Outcome: Ongoing     Problem: Skin Integrity:  Goal: Will show no infection signs and symptoms  Description: Will show no infection signs and symptoms  Outcome: Ongoing

## 2020-08-25 NOTE — CARE COORDINATION
SW called pt room to discuss discharge planning, SW explained the therapy team recommendations regarding SNF, pt is undecided and requested for SW to call her daughter Isabellasandi Cruz. SW called Isabella Cruz and left a message on her voicemail regarding assisting with discharge planning. SW await return call from pt daughter Isabella Cruz.

## 2020-08-25 NOTE — PROGRESS NOTES
Occupational Therapy  Facility/Department: [facility] PROGRESSIVE CARE  Daily Treatment Note  NAME: Jeffery Casanova  : 1945  MRN: [id number]    RN Steve Ewing reports patient is medically stable for therapy treatment this date. Chart reviewed prior to treatment and patient is agreeable for therapy. All lines intact and patient positioned comfortably at end of treatment. All patient needs addressed prior to ending therapy session. Date of Service: 2020    Discharge Recommendations:  Subacute/Skilled Nursing Facility  OT Equipment Recommendations  Equipment Needed: Yes  Mobility Devices: [place]: Rolling  ADL Assistive Devices: Grab Bars - shower;Long-handled [place]oe [place];Long-handled Sponge;Reacher    Assessment   Performance deficits / Impairments: Decreased functional mobility ; Decreased safe awareness;Decreased strength;Decreased high-level IADLs;Decreased endurance;Decreased ADL status; Decreased balance;Decreased vision/visual deficit; Decreased coordination  Assessment: Recommend skilled OT POC to increase functional I and safety as able to return home with assist.  Pt could benefit from OT at [facility] setting. Prognosis: Good  OT Education: OT Role;Plan of Care;Energy Conservation;Transfer Training  Patient Education: call light use/fall prevention, safety in function, weight shifting, pursed lip breathing, recommendations for continued therapy, importance of LLE NWB and adherence to MD orders, safety in function, proper LLE position  REQUIRES OT FOLLOW UP: Yes  Activity Tolerance  Activity Tolerance: Patient limited by fatigue;Patient Tolerated treatment well  Activity Tolerance: fair minus; pt fatigues easily  Safety Devices  Safety Devices in place: Yes  Type of devices: Call light within reach; Chair alarm in place; Left in chair;Patient at risk for falls;Gait belt;Nurse notified(PT in with pt upon exit.)         Patient Diagnosis(es): The primary encounter diagnosis was Syncope, unspecified syncope type. Diagnoses of Chronic obstructive pulmonary disease with acute exacerbation (HCC) and Closed extra-articular fracture of distal end of left tibia, initial encounter were also pertinent to this visit. has a past medical history of Anemia, Chronic obstructive pulmonary disease ([facility] Diabetes mellitus ([facility] GERD (gastroesophageal reflux disease), Hypertension, Other hyperlipidemia, and Systolic congestive heart failure ([facility] has a past surgical history that includes Hysterectomy; Colonoscopy; Tubal ligation; Tonsillectomy; and [place] tooth extraction. Restrictions  Restrictions/Precautions  Restrictions/Precautions: Weight Bearing, General Precautions, Fall Risk  Required Braces or Orthoses?: Yes  Lower Extremity Weight Bearing Restrictions  Left Lower Extremity Weight Bearing: Non Weight Bearing  Required Braces or Orthoses  Left Lower Extremity Brace: (posterior splint Left lower leg)  Position Activity Restriction  Other position/activity restrictions: NWB LLE, O2 NC@ 3 L/min, alarms, droplet + to r/o COVID,up as karla per RN  Subjective   General  Chart Reviewed: Yes  Patient assessed for rehabilitation services?: Yes  Family / Caregiver Present: No  Vital Signs  Patient Currently in Pain: Denies   Orientation  Orientation  Overall Orientation Status: Within Functional Limits  Objective    ADL  Grooming: Setup;Stand by assistance(to wash B hands and face seated EOB)  UE Bathing: Unable to assess(Per RN, pt washed up with nursing this am for UB.)  LE Bathing: Setup;Contact guard assistance(supine in bed and pt was able to wash front beatriz area. Pt was able to roll onto side and washed posterior beatriz area as well supine in bed and good use of rail as needed.   Pt was seated EOB for B thighs/RLE washing and SBA for safety.)  UE Dressing: Setup;Minimal assistance(for hosp gown adjustment in back to increase pt's modesty)  LE Dressing: Setup;Maximum assistance(to doff/rosey brief supine in bed)  Toileting: Unable to assess(Pt stated no needs during session.)   *Pt was edu on pursed lip breathing tech and pacing self, resting as needed, completing functional tasks sitting or supine vs standing all for EC/WS tech. Pt with fair carry over. Balance  Sitting Balance: Stand by assistance  Standing Balance: Moderate assistance(x2 for safety/balance and adherence to LLE NWB.)  Standing Balance  Time: stand karla< 1 min with RW for functional tasks  Functional Mobility  Functional - Mobility Device: Rolling Walker  Activity: (bed to door and back to bedside chair with increased time; pt with good adherence to LLE NWB while hopping.)  Assist Level: Moderate assistance(x2 for safety and balance and to adhere to LLE NWB)  Functional Mobility Comments: MOD verbal instruction/tactile asist and demo for weight shifting, LLE NWB/proper position, extending BUE's on AD for increased support, pacing, RW safety and awareness/assist with lines to increase overall safety/balance. Toilet Transfers  Toilet Transfers Comments: N/T and pt with no needs. Bed mobility  Rolling to Left: Minimal assistance  Supine to Sit: Minimal assistance;Stand by assistance(x2 attempts and 1st one min assist with support for LLE and with increased time and 2nd one SBA for safety and verbal instruction to slow down movements)  Sit to Supine: Stand by assistance(to complete doff/rosey brief)  Comment: Pt with good use of rail and needed verbal instruction for pursed lip breathing tech. Transfers  Stand Step Transfers: Moderate assistance;2 Person assistance(for safety/balance and use of RW)  Sit to stand:  Moderate assistance;2 Person assistance(Pt completed x2 sit to stands from EOB and bedside chair)  Stand to sit: Moderate assistance;2 Person assistance  Transfer Comments: MAX-MOD verbal instruction/tactile assist and demo for B hand placement, nose over toes as able, RW safety, weight shifting, squaring self/AD up to surface prior to sitting and kepeing AD with pt at all times, LLE NWB and proper positon as well as awareness/assist with lines to increase safety/reduce falls as able. Cognition  Overall Cognitive Status: Exceptions  Arousal/Alertness: Appropriate responses to stimuli  Following Commands: Follows multistep commands with increased time; Follows multistep commands with repitition  Attention Span: Appears intact  Memory: Decreased short term memory  Safety Judgement: Decreased awareness of need for assistance;Decreased awareness of need for safety  Problem Solving: Assistance required to identify errors made;Assistance required to generate solutions;Assistance required to implement solutions;Assistance required to correct errors made;Decreased awareness of errors  Insights: Decreased awareness of deficits  Initiation: Requires cues for some  Sequencing: Requires cues for some                                         Plan   Plan  Times per week: 4-5x/week 1x/day as karla  Current Treatment Recommendations: Strengthening, Functional Mobility Training, Safety Education & Training, Positioning, Balance Training, Pain Management, Endurance Training, Patient/Caregiver Education & Training, Equipment Evaluation, Education, & procurement, Self-Care / ADL, Home Management Training                                                    AM-PAC Score   16    *Co-treatment with PT warranted secondary to decreased safety and independence requiring 2 skilled therapy professionals to address individual discipline's goals. OT addressing preparation for ADL transfer, sitting and standing balance for increased ADL performance, sitting/activity tolerance, functional reaching, environmental safety/scanning, fall prevention, functional mobility for ADL transfers, ability to sequence and follow directions and functional UE strength.     Goals  Short term goals  Time Frame for Short term goals: by discharge, pt to demo  Short term goal 1: all bed mob tasks with use of rail as needed to min assist/CG. Short term goal 2: increase in BUE strength by a 1/2 grade to assist with self care and be I with BUE HEP with use of hand outs as needed. Short term goal 3: UB ADL to set up and LB ADL to mod assist of 1 with use of AD/AE as needed. Short term goal 4: toileting tasks with use of BSC/AD and grab bar as needed to mod assist of 1. Short term goal 5: ADL transfers and functional mob with AD as needed to min assist of 1 and good adherence to LLE NWB. Long term goals  Long term goal 1: Pt to stand with CG and AD karla > 4 mins as able to reduce falls with self care. Long term goal 2: Pt to be I with EC/WS and fall preventjon tech as well as DME/AE and AD recommendations with use of hand outs as needed. Patient Goals   Patient goals : Get home soon!        Therapy Time   Individual Concurrent Group Co-treatment   Time In 1110         Time Out 1139         Minutes [address] [place] [address]

## 2020-08-25 NOTE — PROGRESS NOTES
Pharmacy recommended DC antibiotics however in view of her persistent symptoms I would continue Rocephin azithromycin, nebulizers, supplemental O2 and incentive spirometry. She is home O2 dependent however noncompliant  Clinical sleep apnea she will need outpatient sleep study  Smoking. Once again she is counseled, quit date. She is on nicotine patch  Frequent falls. PT OT on board. Syncope. Cardiology on board awaiting echocardiogram report  History of chronic A. fib. Currently normal sinus rhythm, rate controlled  Chronic anticoagulation on Coumadin titrated by pharmacy  Insulin-dependent diabetes mellitus with A1c of 7.2. She is noncompliant with ADA 1800 diet. Cover with sliding scale continue Lantus  She will benefit from SNF  We will follow along  Plan of care discussed with patient, questions answered  This note is created with a voice recognition program and while intend to generate a document that accurately reflects the content of the visit, no guarantee can be provided that every mistake has been identified and corrected by editing.     Adam Chowdary MD 8/25/2020 4:43 PM

## 2020-08-25 NOTE — PROGRESS NOTES
Pharmacy Note -*Inpatient* Warfarin Consult daily follow-up    Pharmacy to Dose Inpatient Warfarin per Dr. Feliberto Meredith  Indication: AFib       Recent Labs     08/23/20  1336 08/25/20  0611   INR 3.7 3.4     Recent Labs     08/23/20  1336 08/24/20  0531 08/25/20  0611   HGB 10.6* 10.6* 9.8*   HCT 36.6 37.7 34.8*    368 [id number]     Significant drug interactions: azithromycin, rocephin  Active orders for other anticoagulants: none     Date INR Dose (mg) Significant Drug Interactions Other Anticoagulants (Y/N)   8/24 3.7  HOLD azithromycin  N   8/25 3.4  Azithromycin,rocephin n                Plan:   No coumadin today . Inr = 3.4 . Repeat inr tomorrow. Daily PT/INR is ordered while inpatient. Thank you for the consult. Will continue to follow.   Cameron Rodriguez    8/25/2020 10:40 AM

## 2020-08-25 NOTE — PROGRESS NOTES
ANTIMICROBIAL STEWARDSHIP  Upon review of the patient's chart, the committee has the following recommendation for your consideration:    Indication: Moderate COPD exacerbation  Day of Antimicrobial Therapy: 3    Recommendation:  Discontinue antibiotics. Switch to oral if continuing. Total therapy days of 5 days for COPD if continue. Rationale:  Procalcitonin does not support bacterial lung infection. Treatment of COPD exacerbation should not require 2 antibiotics. Discontinue azithromycin. Patient is taking oral medications. Oral route should be appropriate. Reasonable beta-lactam oral sub for ceftriaxone would be cefdinir. afebrile >48 hours. .  Recent Labs     08/24/20  0531 08/25/20  0611   WBC 15.5* 15.2*     Recent Labs     08/23/20  1336   PROCAL 0.06       Unnecessary or inappropriate antimicrobial use increases the risk of subsequent infections with resistant bacterial infections and drug-associated toxicities including C. difficile infection. Thank you. Henri Alex, PharmD  8/25/2020  11:24 AM    The Antimicrobial Stewardship Committee at [facility] is led by  Bea Chaudhry MD, Infectious Diseases Section Chair.

## 2020-08-25 NOTE — PROGRESS NOTES
Physical Therapy  Facility/Department: [facility] PROGRESSIVE CARE  Daily Treatment Note  NAME: Malinda Miles  : 1945  MRN: [id number]    Date of Service: 2020    Discharge Recommendations:  Subacute/Skilled Nursing Facility        Assessment   Body structures, Functions, Activity limitations: Decreased functional mobility ; Decreased safe awareness;Decreased balance;Decreased endurance;Decreased strength  Assessment: Pt with less deficits noted in bed mobility, but still mod2 assist with transfers, ambulation, balance, and has limits of endurance this session. Pt requires continued IP PT & D/C to [address] to maximize independence with functional mobility, balance, safety awareness & activity tolerance  Prognosis: Good  Decision Making: Medium Complexity  Exam: functional mobility, activity tolerance, Balance, & [employer] AM-PAC 6 Clicks Basic Mobility  Clinical Presentation: evolving  PT Education: Plan of Care;Goals;Transfer Training;Functional Mobility Training;Gait Training;General Safety  Patient Education: Ed functional mobility, technique for NWB LLE & use of R/walker, safety awareness, breathing techniques, LE ex's & sit to stands  REQUIRES PT FOLLOW UP: Yes  Activity Tolerance  Activity Tolerance: Patient limited by pain     Patient Diagnosis(es): The primary encounter diagnosis was Syncope, unspecified syncope type. Diagnoses of Chronic obstructive pulmonary disease with acute exacerbation (HCC) and Closed extra-articular fracture of distal end of left tibia, initial encounter were also pertinent to this visit. has a past medical history of Anemia, Chronic obstructive pulmonary disease ([address] Diabetes mellitus ([address] GERD (gastroesophageal reflux disease), Hypertension, Other hyperlipidemia, and Systolic congestive heart failure ([address] has a past surgical history that includes Hysterectomy; Colonoscopy; Tubal ligation;  Tonsillectomy; and [place] tooth safety.)  Sit to Supine: Stand by assistance(to complete doff/rosey brief)  Scootin Person assistance;Minimal assistance  Comment: better use of rail and needed cues for pursed lip breathing. Transfers  Sit to Stand: Moderate Assistance;2 Person Assistance  Stand to sit: Moderate Assistance;2 Person Assistance  Bed to Chair: Moderate assistance;2 Person Assistance  Stand Pivot Transfers: Moderate Assistance;2 Person Assistance  Lateral Transfers: Moderate Assistance;2 Person Assistance  Comment: Needed Ed + tactile assist for consistent hand placement for safe sit/stand & to keep walker close at ALL times  Ambulation  Ambulation?: Yes  WB Status: NWB LLE  Ambulation 1  Surface: level tile  Device: [address]: Moderate assistance;2 Person assistance  Quality of Gait: completed with hop to pattern, needs cues/assist to keep walker close at ALL times  Distance: 15ft  Stairs/Curb  Stairs?: No     Balance  Sitting - Static: Good  Sitting - Dynamic: Good  Standing - Static: Fair;+(R/W)  Standing - Dynamic: Fair(R/W)  Exercises  Comments: Ed functional mobility, technique for NWB LLE & use of R/walker, safety awareness, LE ex's & sit to stands x 4    All lines intact, call light within reach, and patient positioned comfortably at end of treatment. All patient needs addressed prior to ending therapy session. G-Code     OutComes Score                                                     AM-PAC Score  -PAC Inpatient Mobility Raw Score : 14 (20)  -PAC Inpatient T-Scale Score : 38.1 (20)  Mobility Inpatient CMS 0-100% Score: 61.29 (20)  Mobility Inpatient CMS G-Code Modifier : CL (20)          Goals  Short term goals  Time Frame for Short term goals: 12 visits  Short term goal 1: Inc bed mobility & transfers to indep with adherence to NWB LLE;  Short term goal 2:  Inc gait to amb 30ft with R/walker & NWB LLE;  Short term goal 3: Pt able to go up/down 4 steps safely with NWB LLE  Short term goal 4: Pt able to tolerate 30-40 min of activity to include 15-20 reps of ex & functional mobility including 5 minutes of standing to facilitate activity tolerance to [facility]; Short term goal 5: Inc strength to [facility] & standing balance to good with device & mainenance of LLE NWB to facilitate pt independence for performance of ADL's & functional mobility, & reduce fall risk; Short term goal 6: Ed pt on home ex's, safety & energy principles & issue written home program;    Plan    Plan  Times per week: 1-2x/D,6-7d/week  Current Treatment Recommendations: Strengthening, Balance Training, Functional Mobility Training, Transfer Training, Endurance Training, Gait Training, Stair training, Home Exercise Program, Safety Education & Training, Patient/Caregiver Education & Training  Safety Devices  Type of devices:  All fall risk precautions in place, Bed alarm in place, Call light within reach, Chair alarm in place, Gait belt, Patient at risk for falls, Left in chair, Nurse notified     Therapy Time   Individual Concurrent Group Co-treatment   Time In 1139     1110   Time Out 1151     1139   Minutes 12     [facility], PT

## 2020-08-26 ENCOUNTER — APPOINTMENT (OUTPATIENT)
Dept: MRI IMAGING | Age: 75
DRG: 261 | End: 2020-08-26
Payer: MEDICARE

## 2020-08-26 ENCOUNTER — APPOINTMENT (OUTPATIENT)
Dept: CT IMAGING | Age: 75
DRG: 261 | End: 2020-08-26
Payer: MEDICARE

## 2020-08-26 ENCOUNTER — APPOINTMENT (OUTPATIENT)
Dept: GENERAL RADIOLOGY | Age: 75
DRG: 261 | End: 2020-08-26
Payer: MEDICARE

## 2020-08-26 LAB
ABSOLUTE EOS #: 0.11 K/UL (ref 0–0.4)
ABSOLUTE IMMATURE GRANULOCYTE: 0.11 K/UL (ref 0–0.3)
ABSOLUTE LYMPH #: 2.26 K/UL (ref 1–4.8)
ABSOLUTE MONO #: 0.9 K/UL (ref 0.2–0.8)
ANION GAP SERPL CALCULATED.3IONS-SCNC: 11 MMOL/L (ref 9–17)
BASOPHILS # BLD: 0 %
BASOPHILS ABSOLUTE: 0 K/UL (ref 0–0.2)
BUN BLDV-MCNC: 18 MG/DL (ref 8–23)
BUN/CREAT BLD: 20 (ref 9–20)
CALCIUM SERPL-MCNC: 8.3 MG/DL (ref 8.6–10.4)
CHLORIDE BLD-SCNC: 105 MMOL/L (ref 98–107)
CO2: 26 MMOL/L (ref 20–31)
CREAT SERPL-MCNC: 0.9 MG/DL (ref 0.5–0.9)
DIFFERENTIAL TYPE: ABNORMAL
EOSINOPHILS RELATIVE PERCENT: 1 % (ref 1–4)
GFR AFRICAN AMERICAN: >60 ML/MIN
GFR NON-AFRICAN AMERICAN: >60 ML/MIN
GFR SERPL CREATININE-BSD FRML MDRD: ABNORMAL ML/MIN/{1.73_M2}
GFR SERPL CREATININE-BSD FRML MDRD: ABNORMAL ML/MIN/{1.73_M2}
GLUCOSE BLD-MCNC: 135 MG/DL (ref 65–105)
GLUCOSE BLD-MCNC: 153 MG/DL (ref 65–105)
GLUCOSE BLD-MCNC: 216 MG/DL (ref 65–105)
GLUCOSE BLD-MCNC: 44 MG/DL (ref 65–105)
GLUCOSE BLD-MCNC: 68 MG/DL (ref 70–99)
GLUCOSE BLD-MCNC: 69 MG/DL (ref 65–105)
GLUCOSE BLD-MCNC: 74 MG/DL (ref 65–105)
HCT VFR BLD CALC: 33.6 % (ref 36.3–47.1)
HEMOGLOBIN: 9.5 G/DL (ref 11.9–15.1)
IMMATURE GRANULOCYTES: 1 %
INR BLD: 2.5
LYMPHOCYTES # BLD: 20 % (ref 24–44)
MAGNESIUM: 1.8 MG/DL (ref 1.6–2.6)
MCH RBC QN AUTO: 23.3 PG (ref 25.2–33.5)
MCHC RBC AUTO-ENTMCNC: 28.3 G/DL (ref 28.4–34.8)
MCV RBC AUTO: 82.4 FL (ref 82.6–102.9)
MONOCYTES # BLD: 8 % (ref 1–7)
MORPHOLOGY: ABNORMAL
MORPHOLOGY: ABNORMAL
NRBC AUTOMATED: 0.2 PER 100 WBC
PDW BLD-RTO: 18.6 % (ref 11.8–14.4)
PLATELET # BLD: 362 K/UL (ref 138–453)
PLATELET ESTIMATE: ABNORMAL
PMV BLD AUTO: 9.6 FL (ref 8.1–13.5)
POTASSIUM SERPL-SCNC: 3.5 MMOL/L (ref 3.7–5.3)
PROTHROMBIN TIME: 27.2 SEC (ref 11.5–14.2)
RBC # BLD: 4.08 M/UL (ref 3.95–5.11)
RBC # BLD: ABNORMAL 10*6/UL
SEG NEUTROPHILS: 70 % (ref 36–66)
SEGMENTED NEUTROPHILS ABSOLUTE COUNT: 7.92 K/UL (ref 1.8–7.7)
SODIUM BLD-SCNC: 142 MMOL/L (ref 135–144)
WBC # BLD: 11.3 K/UL (ref 3.5–11.3)
WBC # BLD: ABNORMAL 10*3/UL

## 2020-08-26 PROCEDURE — 97110 THERAPEUTIC EXERCISES: CPT

## 2020-08-26 PROCEDURE — 80048 BASIC METABOLIC PNL TOTAL CA: CPT

## 2020-08-26 PROCEDURE — 83735 ASSAY OF MAGNESIUM: CPT

## 2020-08-26 PROCEDURE — 82947 ASSAY GLUCOSE BLOOD QUANT: CPT

## 2020-08-26 PROCEDURE — 6360000004 HC RX CONTRAST MEDICATION: Performed by: PSYCHIATRY & NEUROLOGY

## 2020-08-26 PROCEDURE — 97535 SELF CARE MNGMENT TRAINING: CPT

## 2020-08-26 PROCEDURE — 97116 GAIT TRAINING THERAPY: CPT

## 2020-08-26 PROCEDURE — 2580000003 HC RX 258: Performed by: EMERGENCY MEDICINE

## 2020-08-26 PROCEDURE — 71046 X-RAY EXAM CHEST 2 VIEWS: CPT

## 2020-08-26 PROCEDURE — 85025 COMPLETE CBC W/AUTO DIFF WBC: CPT

## 2020-08-26 PROCEDURE — 94640 AIRWAY INHALATION TREATMENT: CPT

## 2020-08-26 PROCEDURE — 6360000002 HC RX W HCPCS: Performed by: FAMILY MEDICINE

## 2020-08-26 PROCEDURE — 2580000003 HC RX 258: Performed by: PSYCHIATRY & NEUROLOGY

## 2020-08-26 PROCEDURE — 99233 SBSQ HOSP IP/OBS HIGH 50: CPT | Performed by: FAMILY MEDICINE

## 2020-08-26 PROCEDURE — 6370000000 HC RX 637 (ALT 250 FOR IP): Performed by: FAMILY MEDICINE

## 2020-08-26 PROCEDURE — 70498 CT ANGIOGRAPHY NECK: CPT

## 2020-08-26 PROCEDURE — 2580000003 HC RX 258: Performed by: FAMILY MEDICINE

## 2020-08-26 PROCEDURE — 70551 MRI BRAIN STEM W/O DYE: CPT

## 2020-08-26 PROCEDURE — 85610 PROTHROMBIN TIME: CPT

## 2020-08-26 PROCEDURE — 36415 COLL VENOUS BLD VENIPUNCTURE: CPT

## 2020-08-26 PROCEDURE — 97530 THERAPEUTIC ACTIVITIES: CPT

## 2020-08-26 PROCEDURE — 99223 1ST HOSP IP/OBS HIGH 75: CPT | Performed by: PSYCHIATRY & NEUROLOGY

## 2020-08-26 PROCEDURE — 2700000000 HC OXYGEN THERAPY PER DAY

## 2020-08-26 PROCEDURE — 94761 N-INVAS EAR/PLS OXIMETRY MLT: CPT

## 2020-08-26 PROCEDURE — 2060000000 HC ICU INTERMEDIATE R&B

## 2020-08-26 RX ORDER — WARFARIN SODIUM 5 MG/1
5 TABLET ORAL
Status: DISPENSED | OUTPATIENT
Start: 2020-08-26 | End: 2020-08-27

## 2020-08-26 RX ORDER — INSULIN GLARGINE 100 [IU]/ML
50 INJECTION, SOLUTION SUBCUTANEOUS NIGHTLY
Status: DISCONTINUED | OUTPATIENT
Start: 2020-08-26 | End: 2020-08-27

## 2020-08-26 RX ORDER — 0.9 % SODIUM CHLORIDE 0.9 %
80 INTRAVENOUS SOLUTION INTRAVENOUS ONCE
Status: COMPLETED | OUTPATIENT
Start: 2020-08-26 | End: 2020-08-26

## 2020-08-26 RX ORDER — SODIUM CHLORIDE 0.9 % (FLUSH) 0.9 %
10 SYRINGE (ML) INJECTION ONCE
Status: COMPLETED | OUTPATIENT
Start: 2020-08-26 | End: 2020-08-26

## 2020-08-26 RX ADMIN — AZITHROMYCIN MONOHYDRATE 500 MG: 500 INJECTION, POWDER, LYOPHILIZED, FOR SOLUTION INTRAVENOUS at 11:31

## 2020-08-26 RX ADMIN — SODIUM CHLORIDE 80 ML: 9 INJECTION, SOLUTION INTRAVENOUS at 17:23

## 2020-08-26 RX ADMIN — INSULIN GLARGINE 50 UNITS: 100 INJECTION, SOLUTION SUBCUTANEOUS at 21:07

## 2020-08-26 RX ADMIN — BUDESONIDE AND FORMOTEROL FUMARATE DIHYDRATE 2 PUFF: 160; 4.5 AEROSOL RESPIRATORY (INHALATION) at 09:31

## 2020-08-26 RX ADMIN — LOSARTAN POTASSIUM 50 MG: 50 TABLET, FILM COATED ORAL at 09:08

## 2020-08-26 RX ADMIN — Medication 10 ML: at 21:05

## 2020-08-26 RX ADMIN — METOPROLOL SUCCINATE 50 MG: 50 TABLET, EXTENDED RELEASE ORAL at 09:08

## 2020-08-26 RX ADMIN — PANTOPRAZOLE SODIUM 20 MG: 20 TABLET, DELAYED RELEASE ORAL at 05:05

## 2020-08-26 RX ADMIN — Medication 10 ML: at 09:09

## 2020-08-26 RX ADMIN — BUDESONIDE AND FORMOTEROL FUMARATE DIHYDRATE 2 PUFF: 160; 4.5 AEROSOL RESPIRATORY (INHALATION) at 20:14

## 2020-08-26 RX ADMIN — Medication 10 ML: at 17:23

## 2020-08-26 RX ADMIN — METOPROLOL SUCCINATE 50 MG: 50 TABLET, EXTENDED RELEASE ORAL at 21:06

## 2020-08-26 RX ADMIN — TIOTROPIUM BROMIDE INHALATION SPRAY 2 PUFF: 3.12 SPRAY, METERED RESPIRATORY (INHALATION) at 09:31

## 2020-08-26 RX ADMIN — AMLODIPINE BESYLATE 10 MG: 10 TABLET ORAL at 09:08

## 2020-08-26 RX ADMIN — INSULIN LISPRO 2 UNITS: 100 INJECTION, SOLUTION INTRAVENOUS; SUBCUTANEOUS at 21:07

## 2020-08-26 RX ADMIN — CEFTRIAXONE SODIUM 1 G: 1 INJECTION, POWDER, FOR SOLUTION INTRAMUSCULAR; INTRAVENOUS at 10:39

## 2020-08-26 RX ADMIN — POTASSIUM CHLORIDE 40 MEQ: 20 TABLET, EXTENDED RELEASE ORAL at 09:08

## 2020-08-26 RX ADMIN — IOPAMIDOL 75 ML: 755 INJECTION, SOLUTION INTRAVENOUS at 17:23

## 2020-08-26 RX ADMIN — METFORMIN HYDROCHLORIDE 500 MG: 500 TABLET, EXTENDED RELEASE ORAL at 21:06

## 2020-08-26 RX ADMIN — FUROSEMIDE 40 MG: 40 TABLET ORAL at 09:08

## 2020-08-26 ASSESSMENT — PAIN SCALES - GENERAL
PAINLEVEL_OUTOF10: 0

## 2020-08-26 NOTE — PROGRESS NOTES
Progress Note    Subjective: Follow-up on COPD, leukocytosis, chronic A. fib, frequent falls, nondisplaced fibular fracture  The patient is awake and alert. No problems overnight. Denies chest pain, angina, and dyspnea. Denies abdominal pain. Tolerating diet. No nausea or vomiting. Admit Date:  8/23/2020    Patient Seen, Chart, Labs, Radiology studies, and Consults reviewed. Objective:   BP (!) 144/60   Pulse 77   Temp 98.1 °F (36.7 °C) (Oral)   Resp 22   Ht 5' 7\" (1.702 m)   Wt 223 lb 11.2 oz (101.5 kg)   LMP  (LMP Unknown)   SpO2 94%   BMI 35.04 kg/m²       Intake/Output Summary (Last 24 hours) at 8/26/2020 1234  Last data filed at 8/26/2020 0932  Gross per 24 hour   Intake 250 ml   Output 450 ml   Net -200 ml     General appearance - alert, well appearing, and in no distress and oriented to person, place, and time  Heart:  RRR, no murmurs, gallops, or rubs, extrasystoles. Lungs:  CTA bilaterally, no wheeze, rales or rhonchi, good air entry, good respiratory effort  Abd: bowel sounds present, nontender, nondistended, no masses, no rigidity, no guarding, no peritoneal signs.   Integumentary: Skin good color, good turgor, no rashes, no acute lesions  Upper Extrem:  No clubbing bilateral hands, no cyanosis or edema  Lower Extrem:no cyanosis or edema, no calf tenderness to lower extremities  Neurological - alert, oriented, normal speech, no focal findings or movement disorder noted, neck supple without rigidity, motor and sensory grossly normal bilaterally      Medications:    warfarin  5 mg Oral Once    cefTRIAXone (ROCEPHIN) IV  1 g Intravenous Q24H    azithromycin  500 mg Intravenous Q24H    insulin lispro  0-12 Units Subcutaneous TID WC    insulin lispro  0-6 Units Subcutaneous Nightly    warfarin (COUMADIN) daily dosing (placeholder)   Other RX Placeholder    amLODIPine  10 mg Oral Daily    furosemide  40 mg Oral Daily    losartan  50 mg Oral Daily    metFORMIN  500 mg Oral BID    budesonide-formoterol  2 puff Inhalation BID    tiotropium  2 puff Inhalation Daily    pantoprazole  20 mg Oral QAM AC    insulin glargine  60 Units Subcutaneous Nightly    nicotine  1 patch Transdermal Daily    sodium chloride flush  10 mL Intravenous 2 times per day    metoprolol succinate  50 mg Oral BID       Data:  CBC:   Recent Labs     08/24/20  0531 08/25/20  0611 08/26/20  0525   WBC 15.5* 15.2* 11.3   RBC 4.50 4.21 4.08   HGB 10.6* 9.8* 9.5*   HCT 37.7 34.8* 33.6*   MCV 83.8 82.7 82.4*   RDW 18.8* 18.8* 18.6*    379 362     BMP:   Recent Labs     08/24/20  0531 08/25/20  0611 08/26/20  0525    140 142   K 3.8 3.4* 3.5*    104 105   CO2 24 27 26   BUN 10 18 18   CREATININE 0.73 0.86 0.90     BNP: No results for input(s): BNP in the last 72 hours. PT/INR:   Recent Labs     08/23/20  1336 08/25/20  0611 08/26/20  0525   PROTIME 36.5* 34.2* 27.2*   INR 3.7 3.4 2.5     APTT:   Recent Labs     08/23/20  1336   APTT 57.8*     CARDIAC ENZYMES: No results for input(s): CKMB, CKMBINDEX, TROPONINI in the last 72 hours. Invalid input(s): CKTOTAL;3  FASTING LIPID PANEL:  Lab Results   Component Value Date    CHOL 154 07/31/2020    HDL 56 08/24/2020    TRIG 159 (H) 07/31/2020     LIVER PROFILE:   Recent Labs     08/23/20  1336   AST 28   ALT 13   BILITOT 0.35   ALKPHOS 98        Assessment/Plan:  Principal Problem:    Syncope and collapse  Active Problems:    Uncontrolled type 1 diabetes mellitus with hyperglycemia (HCC)    Oxygen dependent    Systolic congestive heart failure (HCC)    Warfarin-induced coagulopathy (HCC)    Chronic atrial fibrillation    COPD with acute exacerbation (HCC)    Hypokalemia    Hypomagnesemia  Resolved Problems:    * No resolved hospital problems. *  COPD exacerbation with chest x-rays showed atelectasis possible infiltrate.   She is on Rocephin and azithromycin, supplemental O2, nebulizer, incentive spirometry  Leukocytosis resolved  Insulin-dependent diabetes mellitus. A few episodes of uneventful hypoglycemia. Decrease Lantus from 60 units to 50 units, sliding scale  History of chronic A. fib. Currently normal sinus rhythm, rate controlled. She is on Coumadin, INR therapeutic. Cardiology on board  2D echo shows well routine left ventricular systolic function and ejection fraction  Nondisplaced fibular fracture in cast as per podiatry. Partial with mobilization with a walker. PT/OT on board  Frequent falls. MRI is unremarkable. Pending neurology input  Hypokalemia and hypomagnesemia resolved. Continue replacement protocol  DVT/GI prophylaxis  Medically stable to be discharged to SNF once precertified  Plan of care discussed with patient and the nursing staff  This note is created with a voice recognition program and while intend to generate a document that accurately reflects the content of the visit, no guarantee can be provided that every mistake has been identified and corrected by editing.     Hannah Moreno MD 8/26/2020 12:34 PM

## 2020-08-26 NOTE — PROGRESS NOTES
Chronic obstructive pulmonary disease with acute exacerbation (HCC) and Closed extra-articular fracture of distal end of left tibia, initial encounter were also pertinent to this visit. has a past medical history of Anemia, Chronic obstructive pulmonary disease ([facility] [address] Diabetes mellitus ([facility] [address] GERD (gastroesophageal reflux disease), Hypertension, Other hyperlipidemia, and Systolic congestive heart failure ([address] [facility] [address] has a past surgical history that includes Hysterectomy; Colonoscopy; Tubal ligation; Tonsillectomy; and [place] tooth extraction. Restrictions  Restrictions/Precautions  Restrictions/Precautions: Weight Bearing, General Precautions, Fall Risk  Required Braces or Orthoses?: Yes  Lower Extremity Weight Bearing Restrictions  Left Lower Extremity Weight Bearing: Non Weight Bearing  Required Braces or Orthoses  Left Lower Extremity Brace: (posterior splint Left lower leg)  Position Activity Restriction  Other position/activity restrictions: NWB LLE, O2 NC@ 3 L/min, alarms,up as karla per RN  Subjective   General  Chart Reviewed: Yes  Patient assessed for rehabilitation services?: Yes  Family / Caregiver Present: No  Pre Treatment Pain Screening  Comments / Details: Pt denies pain however states she had a coughing spell prior to writer arrival.  Vital Signs  Patient Currently in Pain: Denies   Orientation  Orientation  Overall Orientation Status: Within Functional Limits  Objective    ADL  UE Bathing: Setup;Stand by assistance(for sponge bath seated EOB)  LE Bathing: Setup;Maximum assistance(Pt was able to wash front beatriz area supine in bed with set up/SBA and assist needed with posterior beatriz area. Pt was able to wash RLE and L thigh with set up/SBA seated EOB.)  UE Dressing: Setup;Minimal assistance(to rosey clean hosp)  LE Dressing: Setup;Maximum assistance(Pt was able to rosey R sock seated EOB with set up/SBA.   Max assist to rosey brief supine in bed.)  Toileting: Maximum assistance(for brief change/donning clean one supine in bed as pt had incontinent episode with coughing spell.)  Additional Comments: *Pt was edu to pace self and rest/take breaks as needed as well as pursed lip breathing tech with self care tasks. Balance  Sitting Balance: Stand by assistance  Standing Balance: Moderate assistance(x2 for safety/balance and adherence to LLE NWB.)  Standing Balance  Time: stand karla < 30 seconds this date with RW for functional tasks due to fatigue/some SOB. Functional Mobility  Functional - Mobility Device: Rolling Walker  Activity: (bed to bedside chair with increased time and pt with good adhrence to LLE NWB.)  Assist Level: Moderate assistance(x2 for safety and balance and to adhere to LLE NWB)  Functional Mobility Comments: MOD verbal instruction/tactile asist and demo for weight shifting, LLE NWB/proper position, extending BUE's on AD for increased support, pacing, RW safety/keeping close to body, scanning room and awareness/assist with lines to increase overall safety. Toilet Transfers  Toilet Transfers Comments: N/T and pt with urine incontinence in bed. Bed mobility  Supine to Sit: Minimal assistance(for LLE support)  Sit to Supine: (Pt agreed to sit up in chair)  Comment: Pt with good use of rail however needed verbal instruction for pursed lip breathing tech vs holding breath. Transfers  Stand Step Transfers: Moderate assistance;2 Person assistance(for safety/balance and use of RW)  Sit to stand:  Moderate assistance;2 Person assistance(Pt completed x1 sit to stands from EOB and x3 from bedside chair)  Stand to sit: Moderate assistance;2 Person assistance  Transfer Comments: MAX-MOD verbal instruction/tactile assist and demo for B hand placement, nose over toes as able, RW safety/keeping close to body and with pt all times, weight shifting, squaring self/AD up to surface prior to sitting and keeping AD with pt at all times, LLE NWB and proper positon as well as awareness/assist with lines to increase safety/reduce falls. Cognition  Overall Cognitive Status: Exceptions  Arousal/Alertness: Appropriate responses to stimuli  Following Commands: Follows multistep commands with increased time; Follows multistep commands with repitition  Attention Span: Appears intact  Memory: Decreased short term memory  Safety Judgement: Decreased awareness of need for assistance;Decreased awareness of need for safety  Problem Solving: Assistance required to identify errors made;Assistance required to generate solutions;Assistance required to implement solutions;Assistance required to correct errors made;Decreased awareness of errors  Insights: Decreased awareness of deficits  Initiation: Requires cues for some  Sequencing: Requires cues for some                                         Plan   Plan  Times per week: 4-5x/week 1x/day as karla  Current Treatment Recommendations: Strengthening, Functional Mobility Training, Safety Education & Training, Positioning, Balance Training, Pain Management, Endurance Training, Patient/Caregiver Education & Training, Equipment Evaluation, Education, & procurement, Self-Care / ADL, Home Management Training                                                    AM-PAC Score   16    *Co-treatment with PT warranted secondary to decreased safety and independence requiring 2 skilled therapy professionals to address individual discipline's goals. OT addressing preparation for ADL transfer, sitting and standing balance for increased ADL performance, sitting/activity tolerance, functional reaching, environmental safety/scanning, fall prevention, functional mobility for ADL transfers, ability to sequence and follow directions and functional UE strength. Goals  Short term goals  Time Frame for Short term goals: by discharge, pt to demo  Short term goal 1: all bed mob tasks with use of rail as needed to min assist/CG.   Short term goal 2: increase in BUE strength by a 1/2 grade to assist with self care and be I with BUE HEP with use of hand outs as needed. Short term goal 3: UB ADL to set up and LB ADL to mod assist of 1 with use of AD/AE as needed. Short term goal 4: toileting tasks with use of BSC/AD and grab bar as needed to mod assist of 1. Short term goal 5: ADL transfers and functional mob with AD as needed to min assist of 1 and good adherence to LLE NWB. Long term goals  Long term goal 1: Pt to stand with CG and AD karla > 4 mins as able to reduce falls with self care. Long term goal 2: Pt to be I with EC/WS and fall preventjon tech as well as DME/AE and AD recommendations with use of hand outs as needed. Patient Goals   Patient goals : Get home soon!        Therapy Time   Individual Concurrent Group Co-treatment   Time In [address] [place] [address]         Time Out 1119         [facility] [address]

## 2020-08-26 NOTE — PLAN OF CARE
Problem: Falls - Risk of:  Goal: Will remain free from falls  Description: Will remain free from falls  8/26/2020 1452 by Eloise Gaspar RN  Outcome: Ongoing   Patient is fall risk per fall scale. Falling star on door. Fall sticker on armband. Hourly rounding performed. Personal belongings and call light within reach. Bed in low position. Restraint alternatives in place. Problem: Breathing Pattern - Ineffective:  Goal: Ability to achieve and maintain a regular respiratory rate will improve  Description: Ability to achieve and maintain a regular respiratory rate will improve  8/26/2020 1452 by Eloise Gaspar RN  Outcome: Ongoing   Oxygen administered as needed. Pulse oximetry levels WNL. No cyanosis noted. Lung sounds wheezing- throughout. Repositioned to encourage proper ventilation. Problem: Skin Integrity:  Goal: Will show no infection signs and symptoms  Description: Will show no infection signs and symptoms  8/26/2020 1452 by Eloise Gaspar RN  Outcome: Ongoing   Skin No rashes or nodules noted. . Mucus membranes moist. Patient turned and repositioned as needed. Heels elevated as needed. Dressings changed as needed and per orders. Continue to monitor skin for breakdown.

## 2020-08-26 NOTE — PROGRESS NOTES
Pharmacy Note -*Inpatient* Warfarin Consult daily follow-up    Pharmacy to Dose Inpatient Warfarin per Dr. [address]  Indication: A-fib      Recent Labs     08/23/20  1336 08/25/20  0611 08/26/20  0525   INR 3.7 3.4 2.5     Recent Labs     08/24/20  0531 08/25/20  0611 08/26/20  0525   HGB 10.6* 9.8* 9.5*   HCT 37.7 34.8* 33.6*    379 362     Significant drug interactions: azithromycin, ceftriaxone  Active orders for other anticoagulants: none    Date INR Dose (mg) Significant Drug Interactions Other Anticoagulants (Y/N)   8/24 3.7  HOLD azithromycin  N   8/25 3.4   -0- Azithromycin,rocephin n   8/26 2.5     \"   \" N     Plan:  Warfarin held x 2 days; interaction with azithromycin/ceftriaxone - Should roughly balance out. Will give home dose of 5 mg today and recheck INR in am.    Daily PT/INR is ordered while inpatient. Thank you for the consult. Will continue to follow.   Tiffanie Camacho RPh    8/26/2020 7:48 AM

## 2020-08-26 NOTE — PROGRESS NOTES
Notified House Supervisor regarding Dr Mehreen Jalloh wanting to implant loop recorder tomorrow. Cath lab to be notified per house supervisor and to get in contact with Dr Mehreen Jalloh. Dr Mehreen Jalloh updated while on unit.

## 2020-08-26 NOTE — CONSULTS
[facility] Neurology   IN-PATIENT SERVICE      NEUROLOGY CONSULT  NOTE            Date:   8/26/2020  Patient name:  Tobias Bravo  Date of admission:  8/23/2020  YOB: 1945      Chief Complaint:     Chief Complaint   Patient presents with    Ankle Pain    Fall     many falls    Other     Weight gain, pulse ox in triate 83%, is suppose to wear oxygen but doesnt all the time, yaquelin says she falls and passes out alot.  Leg Swelling       Reason for Consult:      Falls    History of Present Illness: The patient is a 76 y.o. female who presents with Ankle Pain; Fall (many falls); Other (Weight gain, pulse ox in triate 83%, is suppose to wear oxygen but doesnt all the time, yaquelin says she falls and passes out alot.); and Leg Swelling  . The patient was seen and examined and the chart was reviewed. Patient presents the ED on 8/23 after falling at home. She suffered a fracture of the left fibula during the fall. She was also noted to be hypoxic, 83% on room air. She has history of COPD on home oxygen. She is a daily smoker. She has been treated for a COPD exacerbation. She is also been treated for CHF exacerbation. Cardiology has been on board, recommending loop recorder implantation for episodes of suspected syncope. She underwent MRI of the brain this morning ordered by primary team which is negative for acute process, there are prominent chronic ischemic white matter changes. She states she has fallen a few times over the last several weeks. She is unclear why she is falling. She states she is usually standing up looking outside the window and all of a sudden she is on the floor. She states it happens pretty suddenly. There is no associated lightheadedness or dizziness preceding the fall. She does not believe that she loses consciousness because during the fall she attempts to cushion her fall. She is not sure if her legs give out.   She usually does not use any ambulation device at home although has a walker in case. She denies any bowel or bladder issues. She denies any significant back pain. She has had some episodes of hypoglycemia while in the hospital.  This morning her glucose was 44. She has also been positive for orthostatic hypotension with a drop of approximately 30 systolic from lying to standing. Past Medical History:     Past Medical History:   Diagnosis Date    Anemia     Chronic obstructive pulmonary disease ([facility] [address] 3/26/2018    Diabetes mellitus ([facility]     GERD (gastroesophageal reflux disease)     Hypertension     Other hyperlipidemia 6/59/1564    Systolic congestive heart failure ([facility] 12/17/2019        Past Surgical History:     Past Surgical History:   Procedure Laterality Date    COLONOSCOPY      HYSTERECTOMY      TONSILLECTOMY      TUBAL LIGATION      WISDOM TOOTH EXTRACTION          Medications Prior to Admission:     Prior to Admission medications    Medication Sig Start Date End Date Taking?  Authorizing Provider   LANTUS SOLOSTAR 100 UNIT/ML injection pen  6/13/20  Yes Historical Provider, MD   metoprolol succinate (TOPROL XL) 50 MG extended release tablet Take 50 mg by mouth 2 times daily  6/17/20  Yes Historical Provider, MD   metFORMIN (GLUCOPHAGE-XR) 500 MG extended release tablet TAKE ONE TABLET BY MOUTH TWICE A DAY 8/10/20  Yes Antonio Box MD   furosemide (LASIX) 40 MG tablet TAKE ONE TABLET BY MOUTH DAILY 7/9/20  Yes Antonio Box MD   LANTUS SOLOSTAR 100 UNIT/ML injection pen INJECT 80 UNITS UNDER THE SKIN TWO TIMES A DAY 7/2/20  Yes Antonio Box MD   amLODIPine (NORVASC) 10 MG tablet TAKE ONE TABLET BY MOUTH DAILY 6/29/20  Yes Antonio Box MD   warfarin (COUMADIN) 5 MG tablet TAKE ONE TABLET BY MOUTH DAILY AS DIRECTED BY [facility]  Patient taking differently: 10mg on Thursday and 5mg on all other days 6/29/20  Yes Antonio Box MD   potassium chloride (KLOR-CON M) 20 MEQ extended release tablet TAKE THREE TABLETS BY MOUTH DAILY. PATIENT NEEDS APPOINTMENT 4/22/20  Yes Hugo Go MD   blood glucose test strips (JERZY CONTOUR TEST) strip USE 1 STRIP BY IN VITRO DAILY AS NEEDED 2/3/20  Yes Hugo Go MD   PROAIR  (80 Base) MCG/ACT inhaler INHALE TWO PUFFS BY MOUTH EVERY 6 HOURS AS NEEDED FOR WHEEZING 10/31/19  Yes Hugo Go MD   Vitamin D, Cholecalciferol, 400 units TABS Take 1 tablet by mouth 2 times daily 9/25/19  Yes Hugo Go MD   HUMALOG KWIKPEN 100 UNIT/ML pen INJECT 20 UNITS SUBCUTANEOUSLY DAILY 9/21/19  Yes Hugo Go MD   Multiple Vitamin (DAILY-NESSA) TABS TAKE ONE TABLET BY MOUTH DAILY 2/13/19  Yes Hugo Go MD   Insulin Pen Needle (PEN NEEDLES) 31G X 6 MM MISC Inject 1 box into the skin 2 times daily 12/27/18  Yes Hugo Go MD        Allergies:     Patient has no known allergies. Social History:     Tobacco:    reports that she has been smoking cigarettes. She started smoking about 55 years ago. She has a 30.00 pack-year smoking history. She has never used smokeless tobacco.  Alcohol:      reports current alcohol use. Drug Use:  reports no history of drug use. Family History:     Family History   Problem Relation Age of Onset    High Blood Pressure Mother     Cancer Mother     Diabetes Mother        Review of Systems:       Constitutional Negative for fever and chills   HEENT Negative for ear discharge, ear pain, nosebleed. Negative for photophobia, headache. Musculoskeletal Negative for joint pain, negative for myalgia   Respiratory  positive for cough, positive for dyspnea. Negative for hemoptysis and sputum. Cardiovascular Negative for palpitations, chest pain. Negative for orthopnea, claudication. Gastrointestinal Negative for nausea, vomiting. Negative for abdominal pain, diarrhea, blood in stool   Genitourinary  Negative for dysuria, hematuria. Negative for suprapubic pain. Negative for bladder incontinence.     Skin Negative for rash or itching modalities distally to proximally in lower extremities     Cerebellar Intact finger-nose-finger testing. Intact heel-shin testing. No dysdiadochokinesia present. Reflex function 1/4 symmetric throughout . Downgoing plantar response bilaterally. (-)Dougherty's sign bilaterally    Gait                   not assessed due to fibular fracture           Diagnostics:      Laboratory Testing:  CBC:   Recent Labs     08/24/20  0531 08/25/20  0611 08/26/20  0525   WBC 15.5* 15.2* 11.3   HGB 10.6* 9.8* 9.5*    379 362     BMP:    Recent Labs     08/24/20  0531 08/25/20  0611 08/26/20  0525    140 142   K 3.8 3.4* 3.5*    104 105   CO2 24 27 26   BUN 10 18 18   CREATININE 0.73 0.86 0.90   GLUCOSE 312* 146* 68*         Lab Results   Component Value Date    CHOL 154 07/31/2020    LDLCHOLESTEROL 51 08/24/2020    HDL 56 08/24/2020    TRIG 159 (H) 07/31/2020    ALT 13 08/23/2020    AST 28 08/23/2020    TSH 0.38 08/24/2020    INR 2.5 08/26/2020    LABA1C 7.2 (H) 08/24/2020    LABMICR 2,157 (H) 07/31/2020         Imaging/Diagnostics:      MRI brain: No acute process. Prominent chronic ischemic white matter changes. I personally reviewed all of the above medications, clinical laboratory, imaging and other diagnostic tests. Impression:      1. Multiple falls, no associated loss of consciousness. Hypoglycemia, orthostatic hypotension likely contributing to episodes. 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 Will rule out vertebrobasilar insufficiency. 2. Marked cerebral chronic ischemic white matter changes    Plan:      CTA head and neck   Maintain normoglycemia, normotension   Continue Coumadin   PT OT   We will follow       Thank you for this very interesting consultation.       Electronically signed by Indu Long DO on 8/26/2020 at 2:02 PM      Chava Moran  Neurology

## 2020-08-26 NOTE — DISCHARGE INSTR - COC
Continuity of Care Form    Patient Name: Julio Cesar Sandy   :  1945  MRN:  [id number]    Admit date:  2020  Discharge date:  2020    Code Status Order: Full Code   Advance Directives:   [facility] Documentation     Date/Time Healthcare Directive Type of Healthcare Directive Copy in [facility] [address] Agent's Name Healthcare Agent's Phone Number    [phone]  No, patient does not have an advance directive for healthcare treatment -- -- -- -- --          Admitting Physician:  Chante Vann MD  PCP: Chante Vann MD    Discharging Nurse: Adrian Pruitt [facility] Unit/Room#: 1017/1017-02  Discharging Unit Phone Number: [phone]    Emergency Contact:   Extended Emergency Contact Information  Primary Emergency Contact: Blayne Rojas [address] Phone: [phone]  Work Phone: [phone]  Mobile Phone: [phone]  Relation: Child  Secondary Emergency Contact: [facility]  Mobile Phone: [phone]  Relation: Child    Past Surgical History:  Past Surgical History:   Procedure Laterality Date    COLONOSCOPY      HYSTERECTOMY      TONSILLECTOMY      TUBAL LIGATION      WISDOM TOOTH EXTRACTION         Immunization History: There is no immunization history on file for this patient.     Active Problems:  Patient Active Problem List   Diagnosis Code    GERD (gastroesophageal reflux disease) K21.9    Hypertension I10    Vertigo R42    Iron deficiency anemia due to chronic blood loss D50.0    Other hyperlipidemia E78.49    Chronic obstructive pulmonary disease (HCC) J44.9    Influenza vaccination declined Z28.21    Pneumococcal vaccination declined Z35.24    Smoker F17.200    Uncontrolled type 1 diabetes mellitus with hyperglycemia ([facility] E10.65    Edema of both lower extremities due to peripheral venous insufficiency I87.2    Oxygen dependent T14.60    Systolic congestive heart failure (HCC) I50.20    Warfarin-induced coagulopathy (HCC) D68.32, T45.515A    Chronic atrial fibrillation I48.20    Syncope and collapse R55    COPD with acute exacerbation (HCC) J44.1    Hypokalemia E87.6    Hypomagnesemia E83.42       Isolation/Infection:   Isolation          No Isolation        Patient Infection Status     Infection Onset Added Last Indicated Last Indicated By Review Planned Expiration Resolved Resolved By    None active    Resolved    COVID-19 Rule Out 08/23/20 08/23/20 08/23/20 COVID-19 (Ordered)   08/25/20 Rule-Out Test Resulted          Nurse Assessment:  Last Vital Signs: BP (!) 144/60   Pulse 77   Temp 98.1 °F (36.7 °C) (Oral)   Resp 22   Ht 5' 7\" (1.702 m)   Wt 223 lb 11.2 oz (101.5 kg)   LMP  (LMP Unknown)   SpO2 94%   BMI 35.04 kg/m²     Last documented pain score (0-10 scale): Pain Level: 0  Last Weight:   Wt Readings from Last 1 Encounters:   08/23/20 223 lb 11.2 oz (101.5 kg)     Mental Status:  oriented and alert    IV Access:  - None    Nursing Mobility/ADLs:  Walking   Assisted  Transfer  Assisted  Bathing  Assisted  Dressing  Assisted  Toileting  Independent  Feeding  [address]  Independent  Med Delivery   whole    Wound Care Documentation and Therapy:        Elimination:  Continence:   · Bowel: Yes  · Bladder: Yes, stress incontinence   Urinary Catheter: None   Colostomy/Ileostomy/Ileal Conduit: No       Date of Last BM: 08/27/2020    Intake/Output Summary (Last 24 hours) at 8/26/2020 1238  Last data filed at 8/26/2020 0932  Gross per 24 hour   Intake 250 ml   Output 450 ml   Net -200 ml     I/O last 3 completed shifts: In: 450 [P.O.:450]  Out: 300 [Urine:300]    Safety Concerns:      At Risk for Falls    Impairments/Disabilities:      Speech    Nutrition Therapy:  Current Nutrition Therapy:   - Oral Diet:  Carb Control 4 carbs/meal (1800kcals/day)    Routes of Feeding: Oral  Liquids: No Restrictions  Daily Fluid Restriction: no  Last Modified Barium Swallow with Video (Video Swallowing Test): not done    Treatments at the Time of Hospital Discharge:   Respiratory Treatments: See MAR   Oxygen Therapy:  3  Ventilator:    - No ventilator support    Rehab Therapies: Physical Therapy and Occupational Therapy  Weight Bearing Status/Restrictions: Non-weight bearing on left leg  Other Medical Equipment (for information only, NOT a DME order):  cane, walker, bath bench and bedside commode  Other Treatments:   1. Skilled RN assessment   2. Med reconciliation   3. Loop recorder placed 8/27. Equipment with patient. To follow up with Cardiology     Patient's personal belongings (please select all that are sent with patient):  Glasses    RN SIGNATURE:  Electronically signed by Diane Nguyen RN on 8/27/20 at 5:06 PM EDT    CASE MANAGEMENT/SOCIAL WORK SECTION    Inpatient Status Date: ***    Readmission Risk Assessment Score:  Readmission Risk              Risk of Unplanned Readmission:        18           Discharging to Facility/ Agency   · Name: [address] [place] [address]   · PXUXOKS:[id number] [place]   · Phone:[phone]  · Fax:[phone]    Dialysis Facility (if applicable)   · Name:  · Address:  · Dialysis Schedule:  · Phone:  · Fax:    / signature: Electronically signed by INGRID Rudd, JARETW on 8/27/20 at 4:54 PM EDT    PHYSICIAN SECTION    Prognosis: Good    Condition at Discharge: Stable    Rehab Potential (if transferring to Rehab): Good    Recommended Labs or Other Treatments After Discharge:     Physician Certification: I certify the above information and transfer of Kayla Forde  is necessary for the continuing treatment of the diagnosis listed and that she requires [facility] for less 30 days.      Update Admission H&P: No change in H&P    PHYSICIAN SIGNATURE:  Electronically signed by Ela Shaikh MD on 8/26/20 at 12:39 PM EDT

## 2020-08-26 NOTE — PROGRESS NOTES
Occupational Therapy  DATE: 2020    NAME: Jase Reynoso  MRN: [id number]   : 1945    Patient not seen this date for Occupational Therapy due to:  [] Blood transfusion in progress  [x] Cancel by RN- hold tx this am per PRAFUL Mckay as pt's sugar was low and requested to check back later as able  [] Hemodialysis  []  Refusal by Patient   [] Spine Precautions   [] Strict Bedrest  [] Surgery  [] Testing      [] Other        [] PT being discontinued at this time. Patient independent. No further needs. [] PT being discontinued at this time as the patient has been transferred to hospice care. No further needs.     Junior Kessler, OT

## 2020-08-26 NOTE — PROGRESS NOTES
Patients blood sugar noted to be 44 per POC testing, patient alert and oriented in bed, NAD. Patient c/o some dizziness but otherwise stable. Orange juice with sugar given. Will re-check in 15 minutes.

## 2020-08-26 NOTE — PROGRESS NOTES
[facility]   Section of Cardiology  Progress Note      Date:  8/26/2020  Patient: Sergo Mccall  Admission:  8/23/2020  1:23 PM  Admit DX: Syncope and collapse [R55]  Age:  76 y.o., 1945                           LOS: 3 days     Reason for evaluation:   Recurrent syncope      SUBJECTIVE:     The patient was seen and examined. Notes and labs reviewed. There were not complications over night. Patient's cardiac review of systems: negative. The patient is generally feeling unchanged. OBJECTIVE:    BP (!) 147/58   Pulse 86   Temp 98.4 °F (36.9 °C) (Oral)   Resp 18   Ht 5' 7\" (1.702 m)   Wt 223 lb 11.2 oz (101.5 kg)   LMP  (LMP Unknown)   SpO2 93%   BMI 35.04 kg/m²     Intake/Output Summary (Last 24 hours) at 8/26/2020 1645  Last data filed at 8/26/2020 0932  Gross per 24 hour   Intake 250 ml   Output 450 ml   Net -200 ml       EXAM:   CONSTITUTIONAL:  awake, alert, cooperative, no apparent distress, and appears stated age. HEENT: Normal jugular venous pulsations, no carotid bruits. Head is atraumatic, normocephalic. Eyes and oral mucosa are normal.  LUNGS: Good respiratory effort. No for increased work of breathing. On auscultation: clear to auscultation bilaterally  CARDIOVASCULAR:  Normal apical impulse, regular rate and rhythm, normal S1 and S2, no S3 or S4, and no murmur or rub noted. ABDOMEN: Soft, nontender, nondistended. Bowel sounds present. No masses or tenderness. SKIN: Warm and dry. EXTREMITIES: No lower extremity edema. Motor movement grossly intact. No cyanosis or clubbing.     Current Inpatient Medications:   warfarin  5 mg Oral Once    insulin glargine  50 Units Subcutaneous Nightly    cefTRIAXone (ROCEPHIN) IV  1 g Intravenous Q24H    azithromycin  500 mg Intravenous Q24H    insulin lispro  0-12 Units Subcutaneous TID     insulin lispro  0-6 Units Subcutaneous Nightly    warfarin (COUMADIN) daily dosing (placeholder)   Other RX Placeholder    amLODIPine  10 mg Oral Daily    furosemide  40 mg Oral Daily    losartan  50 mg Oral Daily    metFORMIN  500 mg Oral BID    budesonide-formoterol  2 puff Inhalation BID    tiotropium  2 puff Inhalation Daily    pantoprazole  20 mg Oral QAM AC    nicotine  1 patch Transdermal Daily    sodium chloride flush  10 mL Intravenous 2 times per day    metoprolol succinate  50 mg Oral BID       IV Infusions (if any):   dextrose         Diagnostics:   Telemetry: Sinus  ECHO: pending. Labs:   CBC:   Recent Labs     08/25/20  0611 08/26/20  0525   WBC 15.2* 11.3   HGB 9.8* 9.5*   HCT 34.8* 33.6*    362     BMP:   Recent Labs     08/25/20  0611 08/26/20  0525    142   K 3.4* 3.5*   CO2 27 26   BUN 18 18   CREATININE 0.86 0.90   LABGLOM >60 >60   GLUCOSE 146* 68*     BNP: No results for input(s): BNP in the last 72 hours. PT/INR:   Recent Labs     08/25/20  0611 08/26/20  0525   PROTIME 34.2* 27.2*   INR 3.4 2.5     APTT:No results for input(s): APTT in the last 72 hours. CARDIAC ENZYMES:No results for input(s): CKTOTAL, CKMB, CKMBINDEX, TROPONINI in the last 72 hours. FASTING LIPID PANEL:  Lab Results   Component Value Date    HDL 56 08/24/2020    LDLCALC 83 01/08/2014    TRIG 159 07/31/2020     LIVER PROFILE:No results for input(s): AST, ALT, LABALBU in the last 72 hours.     ASSESSMENT:    Patient Active Problem List   Diagnosis    GERD (gastroesophageal reflux disease)    Hypertension    Vertigo    Iron deficiency anemia due to chronic blood loss    Other hyperlipidemia    [facility] disease [facility]    Influenza vaccination declined    Pneumococcal vaccination declined    Smoker    Uncontrolled type 1 diabetes mellitus with hyperglycemia ([facility]    Edema of both lower extremities due to peripheral venous insufficiency    Oxygen dependent    Systolic congestive heart failure (HCC)    Warfarin-induced coagulopathy (HCC)    Chronic atrial fibrillation    Syncope and collapse    COPD with acute exacerbation (HCC)    Hypokalemia    Hypomagnesemia       PLAN:    1. Patient has recurrent syncope with no prodromal symptoms. She had physical injury and testing has been inconclusive. I will recommend loop recorder implantation. Patient understood and wished to proceed. All her questions were answered to her satisfaction. Please see orders. Discussed with patient and nursing.     Dixie Swann MD

## 2020-08-26 NOTE — PLAN OF CARE
Problem: Falls - Risk of:  Goal: Will remain free from falls  Description: Will remain free from falls  8/26/2020 0329 by Benjamín Lozano RN  Outcome: Ongoing  Note: Bed locked and in low position. Call light and personal items within reach. Instructed patient to call for help when need to be OOB. Hourly rounding.     8/25/2020 1837 by Shayna Saldana RN  Outcome: Ongoing  Goal: Absence of physical injury  Description: Absence of physical injury  Outcome: Ongoing     Problem: Breathing Pattern - Ineffective:  Goal: Ability to achieve and maintain a regular respiratory rate will improve  Description: Ability to achieve and maintain a regular respiratory rate will improve  8/26/2020 0329 by Benjamín Lozano RN  Outcome: Ongoing  8/25/2020 1837 by Shayna Saldana RN  Outcome: Ongoing     Problem: Skin Integrity:  Goal: Will show no infection signs and symptoms  Description: Will show no infection signs and symptoms  8/26/2020 0329 by Benjamín Lozano RN  Outcome: Ongoing  8/25/2020 1837 by Shayna Saldana RN  Outcome: Ongoing  Goal: Absence of new skin breakdown  Description: Absence of new skin breakdown  Outcome: Ongoing

## 2020-08-27 ENCOUNTER — TELEPHONE (OUTPATIENT)
Dept: PODIATRY | Age: 75
End: 2020-08-27

## 2020-08-27 VITALS
RESPIRATION RATE: 22 BRPM | WEIGHT: 223.7 LBS | SYSTOLIC BLOOD PRESSURE: 144 MMHG | BODY MASS INDEX: 35.11 KG/M2 | DIASTOLIC BLOOD PRESSURE: 58 MMHG | OXYGEN SATURATION: 93 % | TEMPERATURE: 98.4 F | HEART RATE: 81 BPM | HEIGHT: 67 IN

## 2020-08-27 LAB
ABSOLUTE EOS #: 0.15 K/UL (ref 0–0.44)
ABSOLUTE IMMATURE GRANULOCYTE: 0.07 K/UL (ref 0–0.3)
ABSOLUTE LYMPH #: 2.05 K/UL (ref 1.1–3.7)
ABSOLUTE MONO #: 0.8 K/UL (ref 0.1–1.2)
ANION GAP SERPL CALCULATED.3IONS-SCNC: 12 MMOL/L (ref 9–17)
BASOPHILS # BLD: 0 % (ref 0–2)
BASOPHILS ABSOLUTE: 0.05 K/UL (ref 0–0.2)
BUN BLDV-MCNC: 11 MG/DL (ref 8–23)
BUN/CREAT BLD: 16 (ref 9–20)
CALCIUM SERPL-MCNC: 8.9 MG/DL (ref 8.6–10.4)
CHLORIDE BLD-SCNC: 104 MMOL/L (ref 98–107)
CO2: 25 MMOL/L (ref 20–31)
CREAT SERPL-MCNC: 0.7 MG/DL (ref 0.5–0.9)
DIFFERENTIAL TYPE: ABNORMAL
EOSINOPHILS RELATIVE PERCENT: 1 % (ref 1–4)
GFR AFRICAN AMERICAN: >60 ML/MIN
GFR NON-AFRICAN AMERICAN: >60 ML/MIN
GFR SERPL CREATININE-BSD FRML MDRD: NORMAL ML/MIN/{1.73_M2}
GFR SERPL CREATININE-BSD FRML MDRD: NORMAL ML/MIN/{1.73_M2}
GLUCOSE BLD-MCNC: 111 MG/DL (ref 65–105)
GLUCOSE BLD-MCNC: 155 MG/DL (ref 65–105)
GLUCOSE BLD-MCNC: 57 MG/DL (ref 65–105)
GLUCOSE BLD-MCNC: 75 MG/DL (ref 65–105)
GLUCOSE BLD-MCNC: 80 MG/DL (ref 70–99)
HCT VFR BLD CALC: 36.6 % (ref 36.3–47.1)
HEMOGLOBIN: 10.7 G/DL (ref 11.9–15.1)
IMMATURE GRANULOCYTES: 1 %
INR BLD: 1.7
LV EF: 65 %
LVEF MODALITY: NORMAL
LYMPHOCYTES # BLD: 18 % (ref 24–43)
MCH RBC QN AUTO: 23.6 PG (ref 25.2–33.5)
MCHC RBC AUTO-ENTMCNC: 29.2 G/DL (ref 28.4–34.8)
MCV RBC AUTO: 80.6 FL (ref 82.6–102.9)
MONOCYTES # BLD: 7 % (ref 3–12)
NRBC AUTOMATED: 0 PER 100 WBC
PDW BLD-RTO: 18.7 % (ref 11.8–14.4)
PLATELET # BLD: 387 K/UL (ref 138–453)
PLATELET ESTIMATE: ABNORMAL
PMV BLD AUTO: 10.1 FL (ref 8.1–13.5)
POTASSIUM SERPL-SCNC: 3.8 MMOL/L (ref 3.7–5.3)
PROTHROMBIN TIME: 19.6 SEC (ref 11.5–14.2)
RBC # BLD: 4.54 M/UL (ref 3.95–5.11)
RBC # BLD: ABNORMAL 10*6/UL
SEG NEUTROPHILS: 73 % (ref 36–65)
SEGMENTED NEUTROPHILS ABSOLUTE COUNT: 8.62 K/UL (ref 1.5–8.1)
SODIUM BLD-SCNC: 141 MMOL/L (ref 135–144)
WBC # BLD: 11.7 K/UL (ref 3.5–11.3)
WBC # BLD: ABNORMAL 10*3/UL

## 2020-08-27 PROCEDURE — 99231 SBSQ HOSP IP/OBS SF/LOW 25: CPT | Performed by: PSYCHIATRY & NEUROLOGY

## 2020-08-27 PROCEDURE — 99239 HOSP IP/OBS DSCHRG MGMT >30: CPT | Performed by: FAMILY MEDICINE

## 2020-08-27 PROCEDURE — 2500000003 HC RX 250 WO HCPCS

## 2020-08-27 PROCEDURE — 85025 COMPLETE CBC W/AUTO DIFF WBC: CPT

## 2020-08-27 PROCEDURE — 33285 INSJ SUBQ CAR RHYTHM MNTR: CPT | Performed by: INTERNAL MEDICINE

## 2020-08-27 PROCEDURE — 36415 COLL VENOUS BLD VENIPUNCTURE: CPT

## 2020-08-27 PROCEDURE — 82947 ASSAY GLUCOSE BLOOD QUANT: CPT

## 2020-08-27 PROCEDURE — 80048 BASIC METABOLIC PNL TOTAL CA: CPT

## 2020-08-27 PROCEDURE — 2580000003 HC RX 258: Performed by: FAMILY MEDICINE

## 2020-08-27 PROCEDURE — 0JH602Z INSERTION OF MONITORING DEVICE INTO CHEST SUBCUTANEOUS TISSUE AND FASCIA, OPEN APPROACH: ICD-10-PCS | Performed by: INTERNAL MEDICINE

## 2020-08-27 PROCEDURE — 6360000002 HC RX W HCPCS: Performed by: FAMILY MEDICINE

## 2020-08-27 PROCEDURE — 97116 GAIT TRAINING THERAPY: CPT

## 2020-08-27 PROCEDURE — 97110 THERAPEUTIC EXERCISES: CPT

## 2020-08-27 PROCEDURE — 85610 PROTHROMBIN TIME: CPT

## 2020-08-27 PROCEDURE — 6370000000 HC RX 637 (ALT 250 FOR IP): Performed by: FAMILY MEDICINE

## 2020-08-27 PROCEDURE — C1764 EVENT RECORDER, CARDIAC: HCPCS

## 2020-08-27 PROCEDURE — 2580000003 HC RX 258: Performed by: EMERGENCY MEDICINE

## 2020-08-27 PROCEDURE — 93306 TTE W/DOPPLER COMPLETE: CPT

## 2020-08-27 PROCEDURE — 97530 THERAPEUTIC ACTIVITIES: CPT

## 2020-08-27 RX ORDER — INSULIN GLARGINE 100 [IU]/ML
50 INJECTION, SOLUTION SUBCUTANEOUS NIGHTLY
Qty: 5 PEN | Refills: 0 | Status: SHIPPED | OUTPATIENT
Start: 2020-08-27 | End: 2020-10-20

## 2020-08-27 RX ORDER — BUDESONIDE AND FORMOTEROL FUMARATE DIHYDRATE 160; 4.5 UG/1; UG/1
AEROSOL RESPIRATORY (INHALATION)
Qty: 1 INHALER | Refills: 2 | Status: SHIPPED | OUTPATIENT
Start: 2020-08-27 | End: 2021-03-08

## 2020-08-27 RX ORDER — INSULIN GLARGINE 100 [IU]/ML
40 INJECTION, SOLUTION SUBCUTANEOUS NIGHTLY
Status: DISCONTINUED | OUTPATIENT
Start: 2020-08-27 | End: 2020-08-27 | Stop reason: HOSPADM

## 2020-08-27 RX ORDER — TIOTROPIUM BROMIDE 18 UG/1
18 CAPSULE ORAL; RESPIRATORY (INHALATION) DAILY
Qty: 30 CAPSULE | Refills: 5 | Status: SHIPPED | OUTPATIENT
Start: 2020-08-27 | End: 2022-05-17 | Stop reason: SDUPTHER

## 2020-08-27 RX ORDER — WARFARIN SODIUM 5 MG/1
5 TABLET ORAL
Status: COMPLETED | OUTPATIENT
Start: 2020-08-27 | End: 2020-08-27

## 2020-08-27 RX ORDER — LOSARTAN POTASSIUM 50 MG/1
50 TABLET ORAL DAILY
Qty: 30 TABLET | Refills: 3 | Status: SHIPPED | OUTPATIENT
Start: 2020-08-27

## 2020-08-27 RX ADMIN — AZITHROMYCIN MONOHYDRATE 500 MG: 500 INJECTION, POWDER, LYOPHILIZED, FOR SOLUTION INTRAVENOUS at 10:31

## 2020-08-27 RX ADMIN — INSULIN LISPRO 2 UNITS: 100 INJECTION, SOLUTION INTRAVENOUS; SUBCUTANEOUS at 17:14

## 2020-08-27 RX ADMIN — PANTOPRAZOLE SODIUM 20 MG: 20 TABLET, DELAYED RELEASE ORAL at 05:09

## 2020-08-27 RX ADMIN — TIOTROPIUM BROMIDE INHALATION SPRAY 2 PUFF: 3.12 SPRAY, METERED RESPIRATORY (INHALATION) at 09:32

## 2020-08-27 RX ADMIN — METOPROLOL SUCCINATE 50 MG: 50 TABLET, EXTENDED RELEASE ORAL at 09:26

## 2020-08-27 RX ADMIN — LOSARTAN POTASSIUM 50 MG: 50 TABLET, FILM COATED ORAL at 09:25

## 2020-08-27 RX ADMIN — Medication 10 ML: at 09:26

## 2020-08-27 RX ADMIN — BUDESONIDE AND FORMOTEROL FUMARATE DIHYDRATE 2 PUFF: 160; 4.5 AEROSOL RESPIRATORY (INHALATION) at 09:32

## 2020-08-27 RX ADMIN — FUROSEMIDE 40 MG: 40 TABLET ORAL at 09:25

## 2020-08-27 RX ADMIN — CEFTRIAXONE SODIUM 1 G: 1 INJECTION, POWDER, FOR SOLUTION INTRAMUSCULAR; INTRAVENOUS at 09:27

## 2020-08-27 RX ADMIN — METFORMIN HYDROCHLORIDE 500 MG: 500 TABLET, EXTENDED RELEASE ORAL at 09:25

## 2020-08-27 RX ADMIN — WARFARIN SODIUM 5 MG: 5 TABLET ORAL at 17:14

## 2020-08-27 RX ADMIN — AMLODIPINE BESYLATE 10 MG: 10 TABLET ORAL at 09:25

## 2020-08-27 ASSESSMENT — PAIN SCALES - GENERAL
PAINLEVEL_OUTOF10: 0

## 2020-08-27 NOTE — PROGRESS NOTES
Physical Therapy  Facility/Department: [facility] PROGRESSIVE CARE  Daily Treatment Note  NAME: Magno Patel  : 1945  MRN: [id number]    Date of Service: 2020    Discharge Recommendations:  Subacute/Skilled Nursing Facility        Assessment   Body structures, Functions, Activity limitations: Decreased functional mobility ; Decreased safe awareness;Decreased balance;Decreased endurance;Decreased strength  Assessment: Pt with better in in bed mobility, but still mod2 assist with transfers, ambulation, balance, and has limits of endurance this session. Pt requires continued IP PT & D/C to [address] to maximize independence with functional mobility, balance, safety awareness & activity tolerance  Prognosis: Good  Decision Making: Medium Complexity  Exam: functional mobility, activity tolerance, Balance, & [employer] AM-PAC 6 Clicks Basic Mobility  Clinical Presentation: evolving  REQUIRES PT FOLLOW UP: Yes  Activity Tolerance  Activity Tolerance: Patient limited by pain     Patient Diagnosis(es): The primary encounter diagnosis was Syncope, unspecified syncope type. Diagnoses of Chronic obstructive pulmonary disease with acute exacerbation ([address] Closed extra-articular fracture of distal end of left tibia, initial encounter, COPD with acute exacerbation ([address] Uncontrolled type 1 diabetes mellitus with hyperglycemia ([address] Uncontrolled type 2 diabetes mellitus without complication, without long-term current use of insulin ([address] and Essential hypertension were also pertinent to this visit. has a past medical history of Anemia, Chronic obstructive pulmonary disease ([address] Diabetes mellitus ([address] GERD (gastroesophageal reflux disease), Hypertension, Other hyperlipidemia, and Systolic congestive heart failure ([address] has a past surgical history that includes Hysterectomy; Colonoscopy; Tubal ligation;  Tonsillectomy; and [place] tooth extraction. Restrictions  Restrictions/Precautions  Restrictions/Precautions: Weight Bearing, General Precautions, Fall Risk  Required Braces or Orthoses?: Yes  Lower Extremity Weight Bearing Restrictions  Left Lower Extremity Weight Bearing: Non Weight Bearing  Required Braces or Orthoses  Left Lower Extremity Brace: (posterior splint Left lower leg)  Position Activity Restriction  Other position/activity restrictions: NWB LLE, O2 NC@ 3 L/min, alarms, up as karla per PRAFUL Fuller  Subjective   General  Chart Reviewed: Yes  Additional Pertinent Hx: COPD  Response To Previous Treatment: Patient with no complaints from previous session. Subjective  Subjective: Pt agreeable to PT  General Comment  Comments: RN okays PT  Pain Screening  Patient Currently in Pain: Denies  Vital Signs  BP Location: Left Arm  Level of Consciousness: Alert  Patient Currently in Pain: Denies  Oxygen Therapy  O2 Device: Nasal cannula       Orientation  Orientation  Overall Orientation Status: Within Normal Limits  Cognition     Cognition  Overall Cognitive Status: Exceptions  Arousal/Alertness: Appropriate responses to stimuli  Following Commands: Follows multistep commands with increased time; Follows multistep commands with repitition  Attention Span: Appears intact  Memory: Decreased short term memory  Safety Judgement: Decreased awareness of need for assistance;Decreased awareness of need for safety  Problem Solving: Assistance required to identify errors made;Assistance required to generate solutions;Assistance required to implement solutions;Assistance required to correct errors made;Decreased awareness of errors  Insights: Decreased awareness of deficits  Initiation: Requires cues for some  Sequencing: Requires cues for some  Objective   Bed mobility  Supine to Sit: Minimal assistance(for LLE support)  Sit to Supine: (Pt agreed to sit up in chair)  Scootin Person assistance;Minimal assistance  Comment: good use of bedrail  Transfers  Sit to Stand: Moderate Assistance;2 Person Assistance  Stand to sit: Moderate Assistance;2 Person Assistance  Bed to Chair: Moderate assistance;2 Person Assistance  Stand Pivot Transfers: Moderate Assistance;2 Person Assistance  Lateral Transfers: Moderate Assistance;2 Person Assistance  Comment: Needed Ed + tactile assist for consistent hand placement for safe sit/stand & to keep walker close at ALL times  Ambulation  Ambulation?: Yes  WB Status: NWB LLE  Ambulation 1  Surface: level tile  Device: [address]: Moderate assistance;2 Person assistance  Quality of Gait: completed with hop to pattern, needs cues/assist to keep walker close at ALL times  Distance: 10ft  Stairs/Curb  Stairs?: No     Balance  Sitting - Static: Good  Sitting - Dynamic: Good  Standing - Static: Fair;+(R/W)  Standing - Dynamic: Fair(R/W)  Exercises  Comments: Ed functional mobility, technique for NWB LLE & use of R/walker, safety awareness, LE ex's x 15  &   sit to stands x 4(needed extended rest breaks for sit to stands)       All lines intact, call light within reach, and patient positioned comfortably at end of treatment. All patient needs addressed prior to ending therapy session. G-Code     OutComes Score                                                     AM-PAC Score    AM-PAC Inpatient Mobility Raw Score : 14  AM-PAC Inpatient T-Scale Score : 38.1  Mobility Inpatient CMS 0-100% Score: 61.29  Mobility Inpatient CMS G-Code Modifier: CL                Goals  Short term goals  Time Frame for Short term goals: 12 visits  Short term goal 1: Inc bed mobility & transfers to indep with adherence to NWB LLE;  Short term goal 2:  Inc gait to amb 30ft with R/walker & NWB LLE;  Short term goal 3: Pt able to go up/down 4 steps safely with NWB LLE  Short term goal 4: Pt able to tolerate 30-40 min of activity to include 15-20 reps of ex & functional mobility including 5 minutes of standing to facilitate activity tolerance to [facility]; Short term goal 5: Inc strength to [facility] & standing balance to good with device & mainenance of LLE NWB to facilitate pt independence for performance of ADL's & functional mobility, & reduce fall risk; Short term goal 6: Ed pt on home ex's, safety & energy principles & issue written home program;    Plan    Plan  Times per week: 1-2x/D,6-7d/week  Current Treatment Recommendations: Strengthening, Balance Training, Functional Mobility Training, Transfer Training, Endurance Training, Gait Training, Stair training, Home Exercise Program, Safety Education & Training, Patient/Caregiver Education & Training  Safety Devices  Type of devices: All fall risk precautions in place, Bed alarm in place, Call light within reach, Gait belt, Patient at risk for falls, Nurse notified, Left in bed  Restraints  Initially in place: No     Therapy Time   Individual Concurrent Group Co-treatment   Time In [phone]   Time Out [phone]   Minutes 9      27        Co-treatment with OT warranted secondary to decreased safety and independence requiring 2 skilled therapy professionals to address individual discipline's goals. PT addressing pre gait trunk strengthening, weight shifting prior to transfers, transfer training and postural control in sitting.       Arnie Carrero, PT

## 2020-08-27 NOTE — PROGRESS NOTES
Attempted to call report to [facility]   Nurse unavailable   Number left,  stated the nurse will give writer call back

## 2020-08-27 NOTE — TELEPHONE ENCOUNTER
----- Message from Gwen Gardner DPM sent at 8/24/2020 12:27 PM EDT -----  Regarding: please schedule appointment  Patient is currently admitted to [facility] with fibular fracture of the left ankle. Patient will need to follow up in our clinic within one week of discharge, likely 2 weeks from now. She can follow any attending. Her primary caretaker is her daughter her phone number should be in the chart.

## 2020-08-27 NOTE — DISCHARGE SUMMARY
Acute on chronic respiratory failure  Acute exacerbation of COPD-home O2 dependent  Atelectasis  Leukocytosis  Smoker  Frequent falls  Degenerative polyarthralgia  History of chronic A. fib. Rate controlled  Hypertension  Hyperlipidemia  Coagulopathy on Coumadin  Hypomagnesemia, and hypokalemia  Consults  Cardiology, PT/OT, neurology  Procedures  Loop recorder implantation  Detail  49-year-old -American female was admitted with acute on chronic respiratory failure. She is home O2 dependent however she is noncompliant with daily use of oxygen or smoke cessation. She was empirically treated with Rocephin and azithromycin. Culture showing gram-positive cocci in clusters. She was also provided with nebulizer, supplemental O2, incentive spirometry as per respiratory services. She responded to the management. Leukocytosis resolved and she has been ambulating without any event  Hypokalemia hypomagnesemia replaced  History of chronic A. fib. Currently normal sinus rhythm, rate controlled. She was evaluated by cardiology and in view of underlying risk factor she underwent loop recorder implantation. She is on Coumadin INR is 1.7 titrated by pharmacy  Hemodynamically stable  I had a long discussion with this individual regarding the importance of smoke cessation, quit date alternative to consider  Clinical sleep apnea. We will consider outpatient sleep study and coordination with sleep lab  Med list reviewed, medications for discharge reconciled, SONIA signed, discharge orders made  Discharge plan discussed with nursing staff  Okay to discharge to SNF  More than 30 minutes were spent organizing this discharge  This note is created with a voice recognition program and while intend to generate a document that accurately reflects the content of the visit, no guarantee can be provided that every mistake has been identified and corrected by editing.

## 2020-08-27 NOTE — PROGRESS NOTES
Physician Progress Note      PATIENT:               Jackson Gaspar  CSN #:                  [id number]  :                       1945  ADMIT DATE:       2020 1:23 PM  [address] [place] DATE:  RESPONDING  PROVIDER #:        Carlee Lorenzo MD          QUERY TEXT:    Pt admitted with syncope. Pt noted to be O2 dependent. If possible, please   document in the progress notes and discharge summary if you are evaluating   and/or treating any of the following: The medical record reflects the following:  Risk Factors: 76 yr old history of COPD  Clinical Indicators: noted that she is dependent on 02 at home  Treatment: maintained N/C O2 3-4 LPM since admit    thank you, Lucia Ross RN, CDS  [phone]  Options provided:  -- Chronic respiratory failure with hypoxia  -- Chronic respiratory failure with hypercapnia  -- Other - I will add my own diagnosis  -- Disagree - Not applicable / Not valid  -- Disagree - Clinically unable to determine / Unknown  -- Refer to Clinical Documentation Reviewer    PROVIDER RESPONSE TEXT:    This patient has chronic respiratory failure with hypoxia.     Query created by: Hayden Laboy on 2020 9:22 AM      Electronically signed by:  Carlee Lorenzo MD 2020 9:33 AM

## 2020-08-27 NOTE — PLAN OF CARE
Problem: Falls - Risk of:  Goal: Will remain free from falls  Description: Will remain free from falls  8/27/2020 1003 by Jenny Cox RN  Outcome: Ongoing  Siderails up x 2  Hourly rounding. Call light in reach. Instructed to call for assist before attempting out of bed. Remains free from falls and accidental injury at this time. Floor free from obstacles, and bed is locked and in lowest position. Adequate lighting provided. Bed alarm on. Fall sticker on wristband.  Fall Sign posted in doorway       Problem: Falls - Risk of:  Goal: Absence of physical injury  Description: Absence of physical injury  8/27/2020 1003 by Jenny Cox RN  Outcome: Ongoing     Problem: Breathing Pattern - Ineffective:  Goal: Ability to achieve and maintain a regular respiratory rate will improve  Description: Ability to achieve and maintain a regular respiratory rate will improve  8/27/2020 1003 by Jenny Cox RN  Outcome: Ongoing     Problem: Skin Integrity:  Goal: Will show no infection signs and symptoms  Description: Will show no infection signs and symptoms  8/27/2020 1003 by Jenny Cox RN  Outcome: Ongoing     Problem: Skin Integrity:  Goal: Absence of new skin breakdown  Description: Absence of new skin breakdown  8/27/2020 1003 by Jenny Cox RN  Outcome: Ongoing

## 2020-08-27 NOTE — PLAN OF CARE
Problem: Falls - Risk of:  Goal: Will remain free from falls  Description: Will remain free from falls  8/26/2020 2356 by Sera Colon RN  Outcome: Ongoing  8/26/2020 1452 by David Trinidad RN  Outcome: Ongoing

## 2020-08-27 NOTE — TELEPHONE ENCOUNTER
Pt is currently admitted.     Will check pts chart periodically to see if pt has been d/c to call and make f/u appt

## 2020-08-27 NOTE — OP NOTE
Operative Note      Patient: Naveen Mckeon  YOB: 1945  MRN: [id number]     PROCEDURE:  Loop Recorder Implantation    PERFORMED BY:  Jez Lawton M.D. INDICATIONS:Syncope   Brief history: Patient presented with recurrent syncope with no prodromal symptoms. She had significant injury and loop recorder implantation was recommended to evaluate for the etiology of syncope  The risks, indications, benefits, as well as alternatives of the procedure  had been discussed with the patient. The risks include bleeding,  infection, and chronic pain at the site. The patient understood and wished  to proceed. The area of the left parasternal region was infiltrated with 1% lidocaine. A 1cm incision was made and the loop recorder was implanted in the left  parasternal region. This was a [employer], serial number  [id number]. Sensing was 0.4mV. The patient tolerated the procedure well without any apparent complications. EBL-N/A    IMPRESSION:  Successful implantation of a loop recorder for syncope.         Electronically signed by Jez Lawton MD on 8/27/2020 at 8:39 AM

## 2020-08-27 NOTE — PROGRESS NOTES
[facility] discharged [facility]. Report called to facility by  Agatha Jacobs completed and signed per writer and physician for [facility].    Discharge packet given to EMS to deliver to RN receiving patient       Telemetry monitor returned

## 2020-08-27 NOTE — PROGRESS NOTES
Pharmacy Note -*Inpatient* Warfarin Consult daily follow-up    Pharmacy to Dose Inpatient Warfarin per Dr. Pamela Muniz  Indication: Afib       Recent Labs     08/25/20  0611 08/26/20  0525 08/27/20  0637   INR 3.4 2.5 1.7     Recent Labs     08/25/20  0611 08/26/20  0525 08/27/20  0637   HGB 9.8* 9.5* 10.7*   HCT 34.8* 33.6* 36.6    [phone]     Significant drug interactions: zithromax,rocephin  Active orders for other anticoagulants: none    Date INR Dose (mg) Significant Drug Interactions Other Anticoagulants (Y/N)   8/24 3.7  HOLD azithromycin  N   8/25 3.4   -0- Azithromycin,rocephin n   8/26 2.5 held    \"   \" N   8/27 1.7   \" n                  Plan:   Coumadin 5mg today. Repeat inr in am.               Daily PT/INR is ordered while inpatient. Thank you for the consult. Will continue to follow.   Hansa Mckee  [facility]   8/27/2020 11:01 AM

## 2020-08-27 NOTE — PROGRESS NOTES
Physical Therapy  Facility/Department: [facility] PRO[facility]ESSIVE CARE  Daily Treatment Note  NAME: General Small  : 1945  MRN: [id number]    Date of Service: 2020    Discharge Recommendations:  Subacute/Skilled Nursing Facility   PT Equipment Recommendations  Equipment Needed: No    Assessment   Body structures, Functions, Activity limitations: Decreased functional mobility ; Decreased safe awareness;Decreased balance;Decreased endurance;Decreased strength  Assessment: Pt with less deficits noted in bed mobility, but still mod assist with transfers, ambulation, balance, and has limits of endurance this session. Pt requires continued IP PT & D/C to [address] to maximize independence with functional mobility, balance, safety awareness & activity tolerance  Prognosis: Good  Clinical Presentation: evolving  PT Education: Plan of Care;Goals;Transfer Training;Functional Mobility Training;Gait Training;General Safety  Patient Education: Ed functional mobility, technique for NWB LLE & use of R/walker, safety awareness, breathing techniques, LE ex's & sit to stands  Activity Tolerance  Activity Tolerance: Patient Tolerated treatment well;Patient limited by pain; Patient limited by fatigue     Patient Diagnosis(es): The primary encounter diagnosis was Syncope, unspecified syncope type. Diagnoses of Chronic obstructive pulmonary disease with acute exacerbation ([address] Closed extra-articular fracture of distal end of left tibia, initial encounter, COPD with acute exacerbation ([address] Uncontrolled type 1 diabetes mellitus with hyperglycemia ([address] Uncontrolled type 2 diabetes mellitus without complication, without long-term current use of insulin ([address] and Essential hypertension were also pertinent to this visit.      has a past medical history of Anemia, Chronic obstructive pulmonary disease ([address] Diabetes mellitus ([address] GERD (gastroesophageal reflux disease), Hypertension, Other hyperlipidemia, and Systolic congestive heart failure ([facility] has a past surgical history that includes Hysterectomy; Colonoscopy; Tubal ligation; Tonsillectomy; and [place] tooth extraction. Restrictions  Restrictions/Precautions  Restrictions/Precautions: Weight Bearing, General Precautions, Fall Risk  Required Braces or Orthoses?: Yes  Lower Extremity Weight Bearing Restrictions  Left Lower Extremity Weight Bearing: Non Weight Bearing  Required Braces or Orthoses  Left Lower Extremity Brace: (posterior splint Left lower leg)  Position Activity Restriction  Other position/activity restrictions: NWB LLE, O2 NC@ 3 L/min, alarms, up as karla per PRAFUL Fuller  Subjective   General  Chart Reviewed: Yes  Additional Pertinent Hx: COPD  Response To Previous Treatment: Patient with no complaints from previous session. Family / Caregiver Present: No  Subjective  Subjective: Pt agreeable to PT  General Comment  Comments: PRAFUL Delacruz, reports patient medically stable for PT treatment. Pain Screening  Patient Currently in Pain: No  Pain Assessment  Pain Assessment: 0-10  Pain Level: 0  Vital Signs  Patient Currently in Pain: No       Orientation  Orientation  Overall Orientation Status: Within Normal Limits  Cognition   Cognition  Overall Cognitive Status: Exceptions  Arousal/Alertness: Appropriate responses to stimuli  Following Commands: Follows multistep commands with increased time; Follows multistep commands with repitition  Attention Span: Appears intact  Memory: Decreased short term memory  Safety Judgement: Decreased awareness of need for assistance;Decreased awareness of need for safety  Problem Solving: Assistance required to identify errors made;Assistance required to generate solutions;Assistance required to implement solutions;Assistance required to correct errors made;Decreased awareness of errors  Insights: Decreased awareness of deficits  Initiation: Requires cues for some  Sequencing: Requires cues for some  Objective   Bed mobility  Rolling to Left: Contact guard assistance  Rolling to Right: Contact guard assistance  Supine to Sit: Contact guard assistance  Scooting: Contact guard assistance  Comment: Improved bed mobility today. Required HOB elevated, use of bed railings and VCs for tech and pursed lip breathing. Transfers  Sit to Stand: Moderate Assistance  Stand to sit: Moderate Assistance  Bed to Chair: Moderate assistance  Stand Pivot Transfers: Moderate Assistance  Lateral Transfers: Moderate Assistance  Comment: Needed Ed + tactile assist for consistent hand placement for safe sit/stand & to keep walker close at ALL times  Ambulation  Ambulation?: Yes  WB Status: NWB LLE  Ambulation 1  Surface: level tile  O2: 3L O2 per NC  Device: [address]: Moderate assistance  Quality of Gait: completed with hop to pattern, needs cues/assist to keep walker close at ALL times. SOB noted upon return to chair VCs for pursed lip breathing with fair carryover, quick recovery within 1 min. Gait Deviations: Decreased step length;Decreased step height  Distance: 10' x 2  Stairs/Curb  Stairs?: No     Balance  Sitting - Static: Good  Sitting - Dynamic: Good  Standing - Static: Fair;+  Standing - Dynamic: Fair  Exercises  Comments: Seated jessica LE AROM x 15 reps, supine jessica LE AROM x 10 reps with verbal and physical demonstration of understanding. G-Code     OutComes Score                                                     AM-PAC Score             Goals  Short term goals  Time Frame for Short term goals: 12 visits  Short term goal 1: Inc bed mobility & transfers to indep with adherence to NWB LLE;  Short term goal 2:  Inc gait to amb 30ft with R/walker & NWB LLE;  Short term goal 3: Pt able to go up/down 4 steps safely with NWB LLE  Short term goal 4: Pt able to tolerate 30-40 min of activity to include 15-20 reps of ex & functional mobility including 5 minutes of standing to facilitate activity tolerance to WFL;  Short term goal 5: Inc strength to [facility] & standing balance to good with device & mainenance of LLE NWB to facilitate pt independence for performance of ADL's & functional mobility, & reduce fall risk; Short term goal 6: Ed pt on home ex's, safety & energy principles & issue written home program;    Plan    Plan  Times per week: 1-2x/D,6-7d/week  Current Treatment Recommendations: Strengthening, Balance Training, Functional Mobility Training, Transfer Training, Endurance Training, Gait Training, Stair training, Home Exercise Program, Safety Education & Training, Patient/Caregiver Education & Training  Safety Devices  Type of devices:  All fall risk precautions in place, Bed alarm in place, Call light within reach, Gait belt, Patient at risk for falls, Nurse notified, Left in bed  Restraints  Initially in place: No     Therapy Time   Individual Concurrent Group Co-treatment   Time In 0938         Time Out [address]

## 2020-08-27 NOTE — PROGRESS NOTES
[facility] Neurology   IN-PATIENT SERVICE      NEUROLOGY PROGRESS  NOTE            Date:   8/27/2020  Patient name:  Raymond Swain  Date of admission:  8/23/2020  YOB: 1945      Interval History:     No current complaints. She is working with therapy, ambulating with walker. Had loop recorder placed earlier today by cardiology. CTA head and neck with no evidence of vertebrobasilar insufficiency  Awaiting rehab placement    History of Present Illness: The patient is a 76 y.o. female who presents with Ankle Pain; Fall (many falls); Other (Weight gain, pulse ox in triate 83%, is suppose to wear oxygen but doesnt all the time, yaquelin says she falls and passes out alot.); and Leg Swelling  . The patient was seen and examined and the chart was reviewed. Patient presents the ED on 8/23 after falling at home. She suffered a fracture of the left fibula during the fall. She was also noted to be hypoxic, 83% on room air. She has history of COPD on home oxygen. She is a daily smoker. She has been treated for a COPD exacerbation. She is also been treated for CHF exacerbation. Cardiology has been on board, recommending loop recorder implantation for episodes of suspected syncope. She underwent MRI of the brain this morning ordered by primary team which is negative for acute process, there are prominent chronic ischemic white matter changes.     She states she has fallen a few times over the last several weeks. She is unclear why she is falling. She states she is usually standing up looking outside the window and all of a sudden she is on the floor. She states it happens pretty suddenly. There is no associated lightheadedness or dizziness preceding the fall. She does not believe that she loses consciousness because during the fall she attempts to cushion her fall. She is not sure if her legs give out. She usually does not use any ambulation device at home although has a walker in case.   She denies any bowel or bladder issues. She denies any significant back pain.     She has had some episodes of hypoglycemia while in the hospital.  This morning her glucose was 44. She has also been positive for orthostatic hypotension with a drop of approximately 30 systolic from lying to standing.     Past Medical History:     Past Medical History:   Diagnosis Date    Anemia     Chronic obstructive pulmonary disease ([facility] 3/26/2018    Diabetes mellitus ([facility]     GERD (gastroesophageal reflux disease)     Hypertension     Other hyperlipidemia     Systolic congestive heart failure ([facility] 2019        Past Surgical History:     Past Surgical History:   Procedure Laterality Date    COLONOSCOPY      HYSTERECTOMY      TONSILLECTOMY      TUBAL LIGATION      WISDOM TOOTH EXTRACTION          Medications during admission:      insulin glargine  40 Units Subcutaneous Nightly    cefTRIAXone (ROCEPHIN) IV  1 g Intravenous Q24H    azithromycin  500 mg Intravenous Q24H    insulin lispro  0-12 Units Subcutaneous TID WC    insulin lispro  0-6 Units Subcutaneous Nightly    warfarin (COUMADIN) daily dosing (placeholder)   Other RX Placeholder    amLODIPine  10 mg Oral Daily    furosemide  40 mg Oral Daily    losartan  50 mg Oral Daily    metFORMIN  500 mg Oral BID    budesonide-formoterol  2 puff Inhalation BID    tiotropium  2 puff Inhalation Daily    pantoprazole  20 mg Oral QAM AC    nicotine  1 patch Transdermal Daily    sodium chloride flush  10 mL Intravenous 2 times per day    metoprolol succinate  50 mg Oral BID         Physical Exam:   BP (!) 158/62   Pulse 85   Temp 97.9 °F (36.6 °C) (Oral)   Resp 16   Ht 5' 7\" (1.702 m)   Wt 223 lb 11.2 oz (101.5 kg)   LMP  (LMP Unknown)   SpO2 93%   BMI 35.04 kg/m²   Temp (24hrs), Av.3 °F (36.8 °C), Min:97.9 °F (36.6 °C), Max:98.8 °F (37.1 °C)          Neurological examination:    Mental status    Alert and oriented x 3; following all commands; speech is fluent, no dysarthria, aphasia.       Cranial nerves    II - visual fields intact to confrontation; pupils reactive  III, IV, VI - extraocular muscles intact; no ASIA; no nystagmus; no ptosis   V - normal facial sensation                                                               VII - normal facial symmetry                                                             VIII - intact hearing                                                                             IX, X - symmetrical palate elevation                                               XI - symmetrical shoulder shrug                                                       XII - midline tongue without atrophy or fasciculation      Motor function  Strength: 5/5 RUE, 5/5 RLE, 5/5 LUE  Normal bulk and tone. No tremors                       Sensory function  decreased in all modalities distally to proximally in lower extremities      Cerebellar Intact finger-nose-finger testing. Intact heel-shin testing. No dysdiadochokinesia present.       Reflex function 1/4 symmetric throughout . Downgoing plantar response bilaterally. (-)Dougherty's sign bilaterally    Gait                    walking with therapist, using walker. Slow.   Nonweightbearing on left lower extremity.                    Diagnostics:      Laboratory Testing:  CBC:   Recent Labs     08/25/20  0611 08/26/20  0525 08/27/20  0637   WBC 15.2* 11.3 11.7*   HGB 9.8* 9.5* 10.7*    362 387     BMP:    Recent Labs     08/25/20  0611 08/26/20  0525 08/27/20  0637    142 141   K 3.4* 3.5* 3.8    105 104   CO2 27 26 25   BUN 18 18 11   CREATININE 0.86 0.90 0.70   GLUCOSE 146* 68* 80         Lab Results   Component Value Date    CHOL 154 07/31/2020    LDLCHOLESTEROL 51 08/24/2020    HDL 56 08/24/2020    TRIG 159 (H) 07/31/2020    ALT 13 08/23/2020    AST 28 08/23/2020    TSH 0.38 08/24/2020    INR 1.7 08/27/2020    LABA1C 7.2 (H) 08/24/2020    LABMICR 2,157 (H) 07/31/2020

## 2020-08-28 ENCOUNTER — TELEPHONE (OUTPATIENT)
Dept: INTERNAL MEDICINE CLINIC | Age: 75
End: 2020-08-28

## 2020-08-28 NOTE — TELEPHONE ENCOUNTER
FYI   Patient is in [facility] and while there she will be seeing the Dr on staff until discharge

## 2020-08-29 LAB
CULTURE: NORMAL
Lab: NORMAL
SPECIMEN DESCRIPTION: NORMAL

## 2020-08-31 ENCOUNTER — TELEPHONE (OUTPATIENT)
Dept: PHARMACY | Age: 75
End: 2020-08-31

## 2020-08-31 NOTE — TELEPHONE ENCOUNTER
Lona from [facility] in [place] called stating patient was just transitioned there from her recent stay at [facility]. The facility will manage her INR while she is there - and they will call us when patient is going to be discharged home to resume INR management in clinic.

## 2020-09-02 ENCOUNTER — OFFICE VISIT (OUTPATIENT)
Dept: PODIATRY | Age: 75
End: 2020-09-02
Payer: MEDICARE

## 2020-09-02 VITALS
DIASTOLIC BLOOD PRESSURE: 71 MMHG | SYSTOLIC BLOOD PRESSURE: 142 MMHG | WEIGHT: 223 LBS | BODY MASS INDEX: 35 KG/M2 | HEART RATE: 74 BPM | HEIGHT: 67 IN

## 2020-09-02 PROCEDURE — 3051F HG A1C>EQUAL 7.0%<8.0%: CPT | Performed by: PODIATRIST

## 2020-09-02 PROCEDURE — 99203 OFFICE O/P NEW LOW 30 MIN: CPT | Performed by: PODIATRIST

## 2020-09-02 PROCEDURE — 11721 DEBRIDE NAIL 6 OR MORE: CPT | Performed by: PODIATRIST

## 2020-09-02 PROCEDURE — 99202 OFFICE O/P NEW SF 15 MIN: CPT | Performed by: STUDENT IN AN ORGANIZED HEALTH CARE EDUCATION/TRAINING PROGRAM

## 2020-09-02 NOTE — LETTER
[facility] Podiatry [facility]  [address]  [facility] [address] [address]  Phone: [phone]  Fax: [phone]    Rajan Thorpe        September 2, 2020     Patient: Tobias Bravo   YOB: 1945   Date of Visit: 9/2/2020       To Whom It May Concern: It is my medical opinion that Reema Pena continue the following:    · Strict nonweight bearing to the left lower extremity using a wheelchair, walker, or crutches  · Continue PT on right lower extremity  · Follow up in [facility] podiatry clinic in 2 weeks  · Get left ankle x-rays at [facility]. [facility] prior to appointment    If you have any questions or concerns, please don't hesitate to call.     Sincerely,        Chico Washington DPM

## 2020-09-08 RX ORDER — METOPROLOL SUCCINATE 50 MG/1
TABLET, EXTENDED RELEASE ORAL
Qty: 90 TABLET | Refills: 0 | Status: SHIPPED | OUTPATIENT
Start: 2020-09-08 | End: 2020-10-21

## 2020-09-08 NOTE — TELEPHONE ENCOUNTER
Noted as historic med 8/24/2020    Talita Tracy is calling to request a refill on the following medication(s):    Medication Request:  Requested Prescriptions     Pending Prescriptions Disp Refills    metoprolol succinate (TOPROL XL) 50 MG extended release tablet [Pharmacy Med Name: METOPROLOL SUCC ER 50 MG TAB] 7 tablet 0     Sig: TAKE ONE TABLET BY MOUTH DAILY       Last Visit Date (If Applicable):  1/54/6798    Next Visit Date:    9/28/2020

## 2020-09-16 ENCOUNTER — HOSPITAL ENCOUNTER (OUTPATIENT)
Age: 75
Discharge: HOME | End: 2020-09-18
Payer: MEDICARE

## 2020-09-16 ENCOUNTER — HOSPITAL ENCOUNTER (OUTPATIENT)
Dept: GENERAL RADIOLOGY | Age: 75
Discharge: HOME | End: 2020-09-18
Payer: MEDICARE

## 2020-09-16 ENCOUNTER — OFFICE VISIT (OUTPATIENT)
Dept: PODIATRY | Age: 75
End: 2020-09-16
Payer: MEDICARE

## 2020-09-16 VITALS
BODY MASS INDEX: 35 KG/M2 | HEART RATE: 79 BPM | HEIGHT: 67 IN | SYSTOLIC BLOOD PRESSURE: 139 MMHG | WEIGHT: 223 LBS | DIASTOLIC BLOOD PRESSURE: 68 MMHG

## 2020-09-16 PROBLEM — S82.65XA CLOSED NONDISPLACED FRACTURE OF LATERAL MALLEOLUS OF LEFT FIBULA: Status: ACTIVE | Noted: 2020-09-16

## 2020-09-16 PROCEDURE — 73610 X-RAY EXAM OF ANKLE: CPT

## 2020-09-16 PROCEDURE — G8417 CALC BMI ABV UP PARAM F/U: HCPCS | Performed by: STUDENT IN AN ORGANIZED HEALTH CARE EDUCATION/TRAINING PROGRAM

## 2020-09-16 PROCEDURE — 1123F ACP DISCUSS/DSCN MKR DOCD: CPT | Performed by: STUDENT IN AN ORGANIZED HEALTH CARE EDUCATION/TRAINING PROGRAM

## 2020-09-16 PROCEDURE — 99213 OFFICE O/P EST LOW 20 MIN: CPT | Performed by: STUDENT IN AN ORGANIZED HEALTH CARE EDUCATION/TRAINING PROGRAM

## 2020-09-16 PROCEDURE — G8427 DOCREV CUR MEDS BY ELIG CLIN: HCPCS | Performed by: STUDENT IN AN ORGANIZED HEALTH CARE EDUCATION/TRAINING PROGRAM

## 2020-09-16 PROCEDURE — 1090F PRES/ABSN URINE INCON ASSESS: CPT | Performed by: STUDENT IN AN ORGANIZED HEALTH CARE EDUCATION/TRAINING PROGRAM

## 2020-09-16 PROCEDURE — 1111F DSCHRG MED/CURRENT MED MERGE: CPT | Performed by: STUDENT IN AN ORGANIZED HEALTH CARE EDUCATION/TRAINING PROGRAM

## 2020-09-16 PROCEDURE — 99212 OFFICE O/P EST SF 10 MIN: CPT | Performed by: STUDENT IN AN ORGANIZED HEALTH CARE EDUCATION/TRAINING PROGRAM

## 2020-09-16 PROCEDURE — 4040F PNEUMOC VAC/ADMIN/RCVD: CPT | Performed by: STUDENT IN AN ORGANIZED HEALTH CARE EDUCATION/TRAINING PROGRAM

## 2020-09-16 PROCEDURE — G8399 PT W/DXA RESULTS DOCUMENT: HCPCS | Performed by: STUDENT IN AN ORGANIZED HEALTH CARE EDUCATION/TRAINING PROGRAM

## 2020-09-16 PROCEDURE — 3017F COLORECTAL CA SCREEN DOC REV: CPT | Performed by: STUDENT IN AN ORGANIZED HEALTH CARE EDUCATION/TRAINING PROGRAM

## 2020-09-16 PROCEDURE — 4004F PT TOBACCO SCREEN RCVD TLK: CPT | Performed by: STUDENT IN AN ORGANIZED HEALTH CARE EDUCATION/TRAINING PROGRAM

## 2020-09-16 NOTE — PROGRESS NOTES
[address]  [address] [place], [place] [id number] [facility], [address] [facility]  Tel: [phone]   Fax: [phone]    Subjective     Cc: Left ankle injury    HPI:  This 76 y.o. female with PMH indicated below presents today for a nondisplaced fracture of the left fibula after multiple falls and syncopal episodes. They presented to ED on 8/23/20 where plain film radiographs were taken showing a nondisplaced fibular fracture. The patient was discharged from the hospital on 8/27/20 to a rehab facility and states she has been NWB since the injury. The patient describes the pain as very mild. Patient denies numbness and tingling. Patient has not been taking any analgesics. She is a type II diabetic, her last HgbA1c was 7.2 on 8/24/20. Denies any current nausea, vomiting, fever, chills, shortness of breath, chest pain or calf pain. Denies any other pedal complaints. ROS:    Constitutional: Denies nausea, vomiting, fever, chills. Neurologic: Denies numbness, tingling, and burning in the feet. Vascular: Denies symptoms of lower extremity claudication. Skin: Denies open wounds. Otherwise negative except as noted in the HPI. PMH:  Past Medical History:   Diagnosis Date    Anemia     Chronic obstructive pulmonary disease ([address] 3/26/2018    Diabetes mellitus ([facility]     GERD (gastroesophageal reflux disease)     Hypertension     Other hyperlipidemia 6/38/0977    Systolic congestive heart failure ([address] 12/17/2019       Surgical History:   Past Surgical History:   Procedure Laterality Date    COLONOSCOPY      HYSTERECTOMY      TONSILLECTOMY      TUBAL LIGATION      WISDOM TOOTH EXTRACTION         Social History:  Social History     Tobacco Use    Smoking status: Current Every Day Smoker     Packs/day: 1.00     Years: 30.00     Pack years: 30.00     Types: Cigarettes     Start date: 1965    Smokeless tobacco: Never Used   Substance Use Topics    Alcohol use: Yes     Comment: social    Drug use:  No 2/13/19  Yes Annmarie Tran MD   Insulin Pen Needle (PEN NEEDLES) 31G X 6 MM MISC Inject 1 box into the skin 2 times daily 12/27/18  Yes Annmarie Tran MD       Objective     Vitals:    09/16/20 1416   BP: 139/68   Pulse: 79       Lab Results   Component Value Date    LABA1C 7.2 (H) 08/24/2020       Physical Exam:  On General Observation: Patient is a pleasant, cooperative, well developed 76 y.o.  adult female. The patient is alert and oriented to time, place and person. Patient has normal affect and mood. Patient presents in a posterior splint. Vascular:  DP and PT pulses are nonpalpable. CFT less than 3 seconds to all digits bilateral.  Skin temperature is warm to warm from proximal to distal bilateral.  Hair growth is absent. edema noted overlying the dorsal foot and lateral ankle on the left foot improved. No varicosities noted. Neuro:  Light touch intact bilateral. Protective sensation intact at all pedal sites via [employer] 5.07 monofilament bilateral.      Derm:  Diffuse xerosis noted. Skin texture and turgor within normal limits. Webspaces 1-4 clean, dry, intact bilateral.  No rashes, subcutaneous nodules, or open lesions noted. No hyperkeratotic tissue. No fracture blisters noted. Musc: Muscle strength deferred secondary to fibular fracture. Very mild pain to palpation to the lateral left fibula. No pain noted to palpation to the lateral collateral ankle ligaments, lateral malleolus and distal syndesmosis. Ankle joint ROM is decreased. Imaging:   Radiographs: 3 views of the left ankle reviewed. Radiographic impression: Nondisplaced left fibular fracture with evidence of trabeculation across the fracture site. Impression: This is a 76 y.o. patient presenting with a healing closed left fibular fracture. Assessment   Magno Patel is a 76 y.o. female with     Diagnosis Orders   1.  Closed nondisplaced fracture of lateral malleolus of left fibula with routine healing, subsequent encounter          Plan   · Patient examined and evaluated  · The patient was educated on clinical and radiographic findings, diagnosis and treatment plans. Patient state that she understands all that has been explained and all questions were answered to her apparent satisfaction. · Patient placed in a CAM boot and may begin limited weight bearing. Patient OK to return home. · Ordered repeat X-rays, instructed patient to get at [facility]. [facility] prior to next appointment  · Patient to RTC in 2 week(s)  · Please, call the office with any questions or concerns     A CAM boot was ordered and placed on the patient for pain control and stabilization due to a nondisplaced fibular fracture. Patient is ambulatory with weakness and instability therefore a CAM boot will help with the stabilization and pain control, and encourage healing. The brace will improve ADL's.      Electronically signed by Mariama Mcnair DPM on 9/16/2020 at 3:11 PM

## 2020-09-16 NOTE — LETTER
[facility] Podiat[facility]  [address] [facility] [address] [facility] [address]  Phone: [phone]  Fax: [address], [place]        September 16, 2020    [address] [place] [address] [address]  [address] [facility]      Dear Walker Wasserman:    From podiatry perspective, you are OK to be discharged from your [facility]. If there are any other services managing your medical care, they should first be contacted to see if you are ok to go home. You may weight bear as tolerated in your CAM boot, but should still take it easy while at home. Thank you. If you have any questions or concerns, please don't hesitate to call.     Sincerely,        Onofre Leavitt DPM

## 2020-09-16 NOTE — PROGRESS NOTES
Patient instructed to remove shoes and socks, instructed to sit in exam chair. Current PCP name is Ravinyovani Cisneros and date of last visit 7/31/20. Do you have a follow up visit scheduled? Yes     If yes the date is 9/28/20  Diabetic visit information    Blood pressure (Control is BP <140/90)  BP Readings from Last 3 Encounters:   09/02/20 (!) 142/71   08/27/20 (!) 144/58   07/31/20 (!) 173/79       BP taken with correct size cuff? - Yes   Repeated if > 140/90 Yes      Tobacco use:  Patient  reports that she has been smoking cigarettes. She started smoking about 55 years ago. She has a 30.00 pack-year smoking history. She has never used smokeless tobacco.  If Smoker - Cessation materials given? - Yes       Diabetic Health Maintenance Items due  Diabetes Management   Topic Date Due    Diabetic retinal exam  12/03/1955       Diabetic retinal exam done in last year? - Yes   If No: remind patient that it is due and they should schedule an exam    Medications  Is patient taking any medications for diabetes? -   Yes  Have blood sugars been controlled? Fasting blood sugars under 120   -   Yes   Random home sugars or today's POCT glucose is under 180 -   Yes   []  If No to the above then patient should schedule appt with PCP.      Diabetic Plan    A1C Plan  Lab Results   Component Value Date    LABA1C 7.2 (H) 08/24/2020    LABA1C 7.7 (H) 07/31/2020    LABA1C 7.6 (H) 09/24/2019      []  If A1C over 8 and last result >3 months ago - Order A1C and refer to PCP   []  If last A1C over 6 months ago - Order A1C and refer to PCP for follow up   []  If elevated blood sugars > 180 - refer to PCP for follow up    []  Blood sugar controlled - A1C under 8 and last check was < 6 months      Cholesterol Plan   Lab Results   Component Value Date    LDLCALC 83 01/08/2014    LDLCHOLESTEROL 51 08/24/2020      []  If LDL > 100 and last result >3 months ago - order Fasting lipids and refer to PCP for follow up   []  If LDL < 100 and over 1

## 2020-09-21 ENCOUNTER — TELEPHONE (OUTPATIENT)
Dept: PHARMACY | Age: 75
End: 2020-09-21

## 2020-09-28 ENCOUNTER — OFFICE VISIT (OUTPATIENT)
Dept: INTERNAL MEDICINE CLINIC | Age: 75
End: 2020-09-28
Payer: MEDICARE

## 2020-09-28 VITALS
HEIGHT: 67 IN | OXYGEN SATURATION: 93 % | WEIGHT: 225 LBS | SYSTOLIC BLOOD PRESSURE: 142 MMHG | BODY MASS INDEX: 35.31 KG/M2 | TEMPERATURE: 97.3 F | HEART RATE: 90 BPM | DIASTOLIC BLOOD PRESSURE: 80 MMHG

## 2020-09-28 PROBLEM — J44.1 COPD WITH ACUTE EXACERBATION (HCC): Status: RESOLVED | Noted: 2020-08-24 | Resolved: 2020-09-28

## 2020-09-28 PROBLEM — E87.6 HYPOKALEMIA: Status: RESOLVED | Noted: 2020-08-24 | Resolved: 2020-09-28

## 2020-09-28 PROBLEM — E10.65 UNCONTROLLED TYPE 1 DIABETES MELLITUS WITH HYPERGLYCEMIA (HCC): Status: RESOLVED | Noted: 2019-04-18 | Resolved: 2020-09-28

## 2020-09-28 PROBLEM — S82.65XA CLOSED NONDISPLACED FRACTURE OF LATERAL MALLEOLUS OF LEFT FIBULA: Status: RESOLVED | Noted: 2020-09-16 | Resolved: 2020-09-28

## 2020-09-28 PROBLEM — E83.42 HYPOMAGNESEMIA: Status: RESOLVED | Noted: 2020-08-24 | Resolved: 2020-09-28

## 2020-09-28 PROCEDURE — 99214 OFFICE O/P EST MOD 30 MIN: CPT | Performed by: FAMILY MEDICINE

## 2020-09-28 RX ORDER — AMLODIPINE BESYLATE 10 MG/1
TABLET ORAL
Qty: 90 TABLET | Refills: 0 | Status: SHIPPED | OUTPATIENT
Start: 2020-09-28 | End: 2021-01-07

## 2020-09-28 ASSESSMENT — ENCOUNTER SYMPTOMS
SHORTNESS OF BREATH: 1
GASTROINTESTINAL NEGATIVE: 1
ALLERGIC/IMMUNOLOGIC NEGATIVE: 1
COUGH: 1
EYES NEGATIVE: 1

## 2020-09-28 ASSESSMENT — COPD QUESTIONNAIRES: COPD: 1

## 2020-09-28 NOTE — TELEPHONE ENCOUNTER
Raymond Swain is calling to request a refill on the following medication(s):    Medication Request:    Last filled 6/29/2020 #90 with 0 RF    Requested Prescriptions     Pending Prescriptions Disp Refills    amLODIPine (NORVASC) 10 MG tablet [Pharmacy Med Name: amLODIPine BESYLATE 10 MG TAB] 90 tablet 0     Sig: TAKE ONE TABLET BY MOUTH DAILY       Last Visit Date (If Applicable):  6/22/0764    Next Visit Date:    9/28/2020

## 2020-09-28 NOTE — PROGRESS NOTES
Subjective:      Patient ID: Naveen Mckeon is a 76 y.o. female. COPD   She complains of cough and shortness of breath. This is a chronic problem. The current episode started more than 1 month ago. The problem occurs intermittently. The problem has been waxing and waning. The cough is non-productive. Her symptoms are aggravated by emotional stress, change in weather, exposure to smoke, exposure to fumes and pollen. Her symptoms are alleviated by beta-agonist, steroid inhaler and leukotriene antagonist. She reports moderate improvement on treatment. Risk factors for lung disease include smoking/tobacco exposure. Her past medical history is significant for COPD. Review of Systems   Constitutional: Negative. HENT: Negative. Eyes: Negative. Respiratory: Positive for cough and shortness of breath. Cardiovascular: Negative. Gastrointestinal: Negative. Endocrine: Negative. Musculoskeletal: Positive for arthralgias. Skin: Negative. Allergic/Immunologic: Negative. Neurological: Negative. Hematological: Negative. Psychiatric/Behavioral: Positive for dysphoric mood. The patient is nervous/anxious. Past family and social history unremarkable. Diagnosis Orders   1. Hospital discharge follow-up     2. Encounter for screening mammogram for breast cancer  ANGELI Digital Screen Bilateral [[id number]]   3. Gastroesophageal reflux disease without esophagitis     4. Essential hypertension     5. Vertigo     6. Iron deficiency anemia due to chronic blood loss     7. Other hyperlipidemia     8. Chronic obstructive pulmonary disease, unspecified COPD type ([address]     9. Influenza vaccination declined     10. Pneumococcal vaccination declined     11. Smoker     12. Edema of both lower extremities due to peripheral venous insufficiency     13. Oxygen dependent     14. Chronic systolic congestive heart failure ([address]     15. Warfarin-induced coagulopathy ([address]     16.  Chronic atrial fibrillation 17. Diabetes mellitus, insulin dependent (IDDM), controlled           Objective:   Physical Exam  Vitals signs and nursing note reviewed. Constitutional:       Appearance: She is well-developed. Comments: Morbid obesity   HENT:      Head: Normocephalic and atraumatic. Right Ear: External ear normal.      Left Ear: External ear normal.   Eyes:      Conjunctiva/sclera: Conjunctivae normal.      Pupils: Pupils are equal, round, and reactive to light. Neck:      Musculoskeletal: Normal range of motion and neck supple. Cardiovascular:      Rate and Rhythm: Normal rate and regular rhythm. Heart sounds: Normal heart sounds. Comments: Systolic congestive heart failure  Hypertension  Hyperlipidemia  Pulmonary:      Effort: Pulmonary effort is normal.      Breath sounds: Normal breath sounds. Comments: Home O2 dependent COPD-active smoker  Abdominal:      General: Bowel sounds are normal.      Palpations: Abdomen is soft. Genitourinary:     Vagina: Normal.   Musculoskeletal: Normal range of motion. Comments: Generative polyarthralgia  Nonhealing wound left foot. She is established with podiatry wearing CAM Walker   Skin:     General: Skin is warm and dry. Neurological:      Mental Status: She is alert and oriented to person, place, and time. Deep Tendon Reflexes: Reflexes are normal and symmetric. Psychiatric:         Behavior: Behavior normal.         Thought Content: Thought content normal.         Judgment: Judgment normal.         Assessment:       Diagnosis Orders   1. Hospital discharge follow-up     2. Encounter for screening mammogram for breast cancer  ANGELI Digital Screen Bilateral [[id number]]   3. Gastroesophageal reflux disease without esophagitis     4. Essential hypertension     5. Vertigo     6. Iron deficiency anemia due to chronic blood loss     7. Other hyperlipidemia     8. Chronic obstructive pulmonary disease, unspecified COPD type ([address]     9.  Influenza vaccination declined     10. Pneumococcal vaccination declined     11. Smoker     12. Edema of both lower extremities due to peripheral venous insufficiency     13. Oxygen dependent     14. Chronic systolic congestive heart failure ([address]     15. Warfarin-induced coagulopathy ([address]     16. Chronic atrial fibrillation     17. Diabetes mellitus, insulin dependent (IDDM), controlled             Plan: This is a hospital follow-up. 75-year-old -American female with morbid underlying obesity, systolic congestive heart failure and COPD who is active smoker was admitted with acute exacerbation of systolic congestive heart failure and COPD exacerbation. She was co-managed with cardiology. She responded very well to diuretics, empiric antibiotic, steroid and nebulizer. Patient states that after discharge she started smoking again. I had a long discussion with this individual regarding the importance of smoke cessation, quit date and alternative to consider. She denies chest pain or anginal symptoms. She is scheduled to be seen by her cardiology. COPD. Quit tobacco.  Continue beta agonist, inhaled corticosteroid and Spiriva  Insulin-dependent diabetes mellitus with A1c of 7.4. Continue current regimen of medication, adhere to ADA 1800 diet, daily moderate exercise and lifestyle change  Hyperlipidemia on statin that she is tolerating well  Hypertension well-controlled with random blood pressure equal less than 130/80. Consume less than 2 g of salt a day  Monofilament testing is unremarkable. She is counseled on diabetic foot care  Chronic wound left foot for which she is established with podiatry. Emphasized importance of euglycemia and close follow-up with podiatry  Chronic anemia H&H stable at baseline  Vertigo. No recent event. Underlying history of degenerative spondylosis of cervical spine which is asymptomatic at this time  GERD. PRN proton pump inhibitor.   Lose weight  Med list and available labs reviewed, discussed with patient, questions answered  Further recommendations to follow  This note is created with a voice recognition program and while intend to generate a document that accurately reflects the content of the visit, no guarantee can be provided that every mistake has been identified and corrected by editing.           Deep Olson MD

## 2020-09-30 ENCOUNTER — HOSPITAL ENCOUNTER (OUTPATIENT)
Age: 75
Discharge: HOME | End: 2020-10-02
Payer: MEDICARE

## 2020-09-30 ENCOUNTER — OFFICE VISIT (OUTPATIENT)
Dept: PODIATRY | Age: 75
End: 2020-09-30
Payer: MEDICARE

## 2020-09-30 ENCOUNTER — HOSPITAL ENCOUNTER (OUTPATIENT)
Dept: GENERAL RADIOLOGY | Age: 75
Discharge: HOME | End: 2020-10-02
Payer: MEDICARE

## 2020-09-30 VITALS
HEART RATE: 79 BPM | DIASTOLIC BLOOD PRESSURE: 69 MMHG | HEIGHT: 67 IN | TEMPERATURE: 97.2 F | WEIGHT: 222 LBS | SYSTOLIC BLOOD PRESSURE: 130 MMHG | BODY MASS INDEX: 34.84 KG/M2

## 2020-09-30 PROCEDURE — 99024 POSTOP FOLLOW-UP VISIT: CPT | Performed by: STUDENT IN AN ORGANIZED HEALTH CARE EDUCATION/TRAINING PROGRAM

## 2020-09-30 PROCEDURE — 99212 OFFICE O/P EST SF 10 MIN: CPT | Performed by: STUDENT IN AN ORGANIZED HEALTH CARE EDUCATION/TRAINING PROGRAM

## 2020-09-30 PROCEDURE — 73610 X-RAY EXAM OF ANKLE: CPT

## 2020-09-30 NOTE — PROGRESS NOTES
[address]  [address] [place] [address],  JENNY Oconnell  Tel: [phone]   Fax: [phone]    Subjective     CC: Follow up L ankle pain    HPI:  Olegario Soto is a 76y.o. year old female who presents to clinic today for follow up of a closed, nondisplaced fracture of her left distal fibula. The original date of injury was 8/23/2020. The patient states she has been ambulating in a CAM boot. Patient states that her pain improved. Patient denies any new complaints today. Primary care physician is Veronika Sanchez MD.    ROS:    Constitutional: Denies nausea, vomiting, fever, chills. Neurologic: Denies numbness, tingling, and burning in the feet. Vascular: Denies symptoms of lower extremity claudication. Skin: Denies open wounds. Otherwise negative except as noted in the HPI. PMH:  Past Medical History:   Diagnosis Date    Anemia     Chronic obstructive pulmonary disease ([facility] 3/26/2018    Diabetes mellitus (HCC)     GERD (gastroesophageal reflux disease)     Hypertension     Other hyperlipidemia 6/83/6193    Systolic congestive heart failure ([facility] 12/17/2019       Surgical History:   Past Surgical History:   Procedure Laterality Date    COLONOSCOPY      HYSTERECTOMY      TONSILLECTOMY      TUBAL LIGATION      WISDOM TOOTH EXTRACTION         Social History:  Social History     Tobacco Use    Smoking status: Current Every Day Smoker     Packs/day: 1.00     Years: 30.00     Pack years: 30.00     Types: Cigarettes     Start date: 1965    Smokeless tobacco: Never Used   Substance Use Topics    Alcohol use: Yes     Comment: social    Drug use: No       Medications:  Prior to Admission medications    Medication Sig Start Date End Date Taking?  Authorizing Provider   amLODIPine (NORVASC) 10 MG tablet TAKE ONE TABLET BY MOUTH DAILY 9/28/20  Yes Veronika Sanchez MD   metoprolol succinate (TOPROL XL) 50 MG extended release tablet TAKE ONE TABLET BY MOUTH DAILY 9/8/20  Yes Loki Bo MD   budesonide-formoterol (SYMBICORT) 160-4.5 MCG/ACT AERO INHALE TWO PUFFS BY MOUTH TWICE A DAY 20  Yes Loki Bo MD   tiotropium (Rashaad Apgar) 18 MCG inhalation capsule Inhale 1 capsule into the lungs daily 20  Yes Loki Bo MD   insulin glargine (LANTUS SOLOSTAR) 100 UNIT/ML injection pen Inject 50 Units into the skin nightly  Patient taking differently: Inject 80 Units into the skin nightly  20  Yes Loki Bo MD   losartan (COZAAR) 50 MG tablet Take 1 tablet by mouth daily 20  Yes Loki Bo MD   metFORMIN (GLUCOPHAGE-XR) 500 MG extended release tablet TAKE ONE TABLET BY MOUTH TWICE A DAY 8/10/20  Yes Loki Bo MD   furosemide (LASIX) 40 MG tablet TAKE ONE TABLET BY MOUTH DAILY 20  Yes Loki Bo MD   warfarin (COUMADIN) 5 MG tablet TAKE ONE TABLET BY MOUTH DAILY AS DIRECTED BY [address]  Patient taking differently: 10mg on Thursday and 5mg on all other days 20  Yes Loki Bo MD   potassium chloride (KLOR-CON M) 20 MEQ extended release tablet TAKE THREE TABLETS BY MOUTH DAILY.  PATIENT NEEDS APPOINTMENT 20  Yes Loki Bo MD   blood glucose test strips (JERZY CONTOUR TEST) strip USE 1 STRIP BY IN VITRO DAILY AS NEEDED 2/3/20  Yes Loki Bo MD   PROAIR  (62 Base) MCG/ACT inhaler INHALE TWO PUFFS BY MOUTH EVERY 6 HOURS AS NEEDED FOR WHEEZING 10/31/19  Yes Loki Bo MD   Vitamin D, Cholecalciferol, 400 units TABS Take 1 tablet by mouth 2 times daily 19  Yes Loki Bo MD   HUMALOG KWIKPEN 100 UNIT/ML pen INJECT 20 UNITS SUBCUTANEOUSLY DAILY  Patient taking differently: Sliding scale 19  Yes Loki Bo MD   Multiple Vitamin (DAILY-NESSA) TABS TAKE ONE TABLET BY MOUTH DAILY 19  Yes Loki Bo MD   Insulin Pen Needle (PEN NEEDLES) 31G X 6 MM MISC Inject 1 box into the skin 2 times daily 18  Yes Loki Bo MD       Objective     Vitals:    20 1358   BP: 130/69 Pulse: 79   Temp: 97.2 °F (36.2 °C)       Lab Results   Component Value Date    LABA1C 7.2 (H) 2020       Physical Exam:  General:  Alert and oriented x3. In no acute distress. Lower Extremity Physical Exam:    Vascular: DP and PT pulses are nonpalpable, Bilateral. CFT <3 seconds to all digits, Bilateral.  No edema, Bilateral.  Hair growth is absent to the level of the digits, Bilateral.     Neuro: Saph/sural/SP/DP/plantar sensation intact to light touch. Protective sensation is intact to 10/10 sites as tested with a 5.07g SWMF, Bilateral.    Musculoskeletal: EHL/FHL/GS/TA gross motor intact. No tenderness to palpation. Gross deformity is absent, Bilateral.     Dermatologic: No open lesions, Bilateral.  Interdigital maceration absent, Bilateral.  Nails 1-10 are wnl. Clinical: None. Imagin2020    Impression    Lateral soft tissue swelling.  Previously noted distal fibula fracture is not    well visualized on the current study, possibly obscured by overlying splint    material.  No change in alignment. Assessment   Marty Laura is a 76 y.o. female with     Diagnosis Orders   1. Diabetes mellitus, insulin dependent (IDDM), controlled   DIABETES FOOT EXAM   2. Closed nondisplaced fracture of lateral malleolus of left fibula with routine healing, subsequent encounter  XR ANKLE LEFT (MIN 3 VIEWS)   3. PAD (peripheral artery disease) (HCC)  VL LOWER EXTREMITY ARTERIAL SEGMENTAL PRESSURES W PPG        Plan   · Patient examined and evaluated. · Diagnosis and treatment options discussed in detail. · Radiographs reviewed and discussed with the patient in detail. · Noninvasive vascular test ordered given nonpalpable pulses. · Patient to RTC in 2 week(s). We will plan to transition the patient to a surgical shoe at this time. · Please, call the office with any questions or concerns. No orders of the defined types were placed in this encounter.     Orders Placed This Encounter

## 2020-09-30 NOTE — PROGRESS NOTES
Patient instructed to remove shoes and socks and instructed to sit in exam chair. Current PCP is Lorelei Steven MD and date of last visit was 9/28/20. Do you have a follow up visit scheduled? No  If yes, the date is       Diabetic visit information    Blood pressure (Control is BP <140/90)  BP Readings from Last 3 Encounters:   09/28/20 (!) 142/80   09/16/20 139/68   09/02/20 (!) 142/71       BP taken with correct size cuff? - Yes   Repeated if > 140/90 NA      Tobacco use:  Patient  reports that she has been smoking cigarettes. She started smoking about 55 years ago. She has a 30.00 pack-year smoking history. She has never used smokeless tobacco.  If Smoker - Cessation materials given? - Yes       Diabetic Health Maintenance Items due  Diabetes Management   Topic Date Due    Diabetic retinal exam  12/03/1955       Diabetic retinal exam done in last year? - no   If No: remind patient that it is due and they should schedule an exam    Medications  Is patient taking any medications for diabetes? -   Yes  Have blood sugars been controlled? Fasting blood sugars under 120   -   Yes   Random home sugars or today's POCT glucose is under 180 -   Yes   []  If No to the above then patient should schedule appt with PCP.      Diabetic Plan    A1C Plan  Lab Results   Component Value Date    LABA1C 7.2 (H) 08/24/2020    LABA1C 7.7 (H) 07/31/2020    LABA1C 7.6 (H) 09/24/2019      []  If A1C over 8 and last result >3 months ago - Order A1C and refer to PCP   []  If last A1C over 6 months ago - Order A1C and refer to PCP for follow up   []  If elevated blood sugars > 180 - refer to PCP for follow up    []  Blood sugar controlled - A1C under 8 and last check was < 6 months      Cholesterol Plan   Lab Results   Component Value Date    LDLCALC 83 01/08/2014    LDLCHOLESTEROL 51 08/24/2020      []  If LDL > 100 and last result >3 months ago - order Fasting lipids and refer to PCP for follow up   []  If LDL < 100 and over 1 year ago - Order Fasting lipids and refer to PCP for follow up   [] LDL is controlled. LDL < 100 and checked within the last year     Blood Pressure  BP Readings from Last 3 Encounters:   09/28/20 (!) 142/80   09/16/20 139/68   09/02/20 (!) 142/71      []  If SBP >140 mmhg - refer to PCP for follow up   []  If DBP > 90 mmhg - refer to PCP for follow up   [] BP is controlled <140/90     Order labs as PCP ordered.   (ie: Lipids, A1C, CMP)

## 2020-09-30 NOTE — PATIENT INSTRUCTIONS
YOUR VASCULAR TEST IS SCHEDULED AT [facility] ON:    10/1/20 AT 2:30 PM.    CHECK IN AT 12:15PM    BRING INSURANCE CARD AND PHOTO ID.

## 2020-10-01 ENCOUNTER — HOSPITAL ENCOUNTER (OUTPATIENT)
Dept: VASCULAR LAB | Age: 75
Discharge: HOME | End: 2020-10-01
Payer: MEDICARE

## 2020-10-01 PROCEDURE — 93923 UPR/LXTR ART STDY 3+ LVLS: CPT

## 2020-10-08 ENCOUNTER — OFFICE VISIT (OUTPATIENT)
Dept: NEUROLOGY | Age: 75
End: 2020-10-08
Payer: MEDICARE

## 2020-10-08 VITALS
SYSTOLIC BLOOD PRESSURE: 171 MMHG | BODY MASS INDEX: 34.77 KG/M2 | HEART RATE: 86 BPM | DIASTOLIC BLOOD PRESSURE: 79 MMHG | WEIGHT: 222 LBS

## 2020-10-08 ASSESSMENT — VISUAL ACUITY: VA_NORMAL: 1

## 2020-10-08 NOTE — PROGRESS NOTES
[address] [place] [address],  [address], [facility], [address], [address] [place] [address]  P: [phone]  F: [phone]    NEUROLOGY CLINIC NOTE     PATIENT NAME: Cezar Deshpande  PATIENT MRN: [id number]  PRIMARY CARE PHYSICIAN: Surya Washington MD    HPI:      Cezar Deshpande is a 76 y.o. female who presents to clinic today as a hospital follow-up for her multiple falls. Patient has past medical history of A. fib on Coumadin, smoking, COPD, CHF. Patient was in the hospital in August 2020 when she had a fall at home. She suffered a fracture of left fibula during the fall. Patient was also noted to be hypoxic 83% on room air. She has history of COPD on home oxygen. Patient reports episodes of syncope when she was standing all of a sudden falls down without any emotional triggers. Patient denies any headache or any history of seizures. Patient says she usually has syncope when she is already standing. While in the hospital she had an loop recorder placed for her syncopal episodes. MRI brain was done which was negative for any acute process. There were prominent chronic ischemic white matter changes. Cardiogram with EF 65%, grade 1 left ventricular diastolic dysfunction. Mild mitral.regurg, mild tricuspid regurg. Patient said she had about 3-4 falls in the past.  She said while standing up she was looking outside the window and all of a sudden she fell on the floor. She said her falls have been sudden. There is no associated lightheadedness or dizziness or vertigo preceding the fall. She said she can briefly lose consciousness for 1 to 2 seconds but then she regained consciousness and she attempts to question her fall. In August 2020 she could not get in herself and had left femur fracture. She complains of back pain and says she had bladder incontinence for about 3 years. In the hospital she had some episodes of hypoglycemia when her blood glucose were 44.   Her orthostatics also positive with a drop of approximately 30 systolic from lying to standing. Day patient denies any new issues or complaints.       PREVIOUS WORKUP:     Lab Results   Component Value Date    WBC 11.7 (H) 08/27/2020    HGB 10.7 (L) 08/27/2020    HCT 36.6 08/27/2020    MCV 80.6 (L) 08/27/2020     08/27/2020       Past Medical History:   Diagnosis Date    Anemia     [facility] 3/26/2018    Diabetes mellitus (HCC)     GERD (gastroesophageal reflux disease)     Hypertension     Other hyperlipidemia 6/88/4712    Systolic congestive heart failure ([facility] 12/17/2019        Past Surgical History:   Procedure Laterality Date    COLONOSCOPY      HYSTERECTOMY      TONSILLECTOMY      TUBAL LIGATION      WISDOM TOOTH EXTRACTION          Social History     Socioeconomic History    Marital status:      Spouse name: Not on file    Number of children: Not on file    Years of education: Not on file    Highest education level: Not on file   Occupational History    Not on file   Social Needs    Financial resource strain: Not on file    Food insecurity     Worry: Not on file     Inability: Not on file    Transportation needs     Medical: Not on file     Non-medical: Not on file   Tobacco Use    Smoking status: Current Every Day Smoker     Packs/day: 1.00     Years: 30.00     Pack years: 30.00     Types: Cigarettes     Start date: 1965    Smokeless tobacco: Never Used   Substance and Sexual Activity    Alcohol use: Yes     Comment: social    Drug use: No    Sexual activity: Not on file   Lifestyle    Physical activity     Days per week: Not on file     Minutes per session: Not on file    Stress: Not on file   Relationships    Social connections     Talks on phone: Not on file     Gets together: Not on file     Attends Gnosticist service: Not on file     Active member of club or organization: Not on file     Attends meetings of clubs or organizations: Not on file Relationship status: Not on file    Intimate partner violence     Fear of current or ex partner: Not on file     Emotionally abused: Not on file     Physically abused: Not on file     Forced sexual activity: Not on file   Other Topics Concern    Not on file   Social History Narrative    Not on file        Current Outpatient Medications   Medication Sig Dispense Refill    amLODIPine (NORVASC) 10 MG tablet TAKE ONE TABLET BY MOUTH DAILY 90 tablet 0    metoprolol succinate (TOPROL XL) 50 MG extended release tablet TAKE ONE TABLET BY MOUTH DAILY 90 tablet 0    budesonide-formoterol (SYMBICORT) 160-4.5 MCG/ACT AERO INHALE TWO PUFFS BY MOUTH TWICE A DAY 1 Inhaler 2    tiotropium (SPIRIVA HANDIHALER) 18 MCG inhalation capsule Inhale 1 capsule into the lungs daily 30 capsule 5    insulin glargine (LANTUS SOLOSTAR) 100 UNIT/ML injection pen Inject 50 Units into the skin nightly (Patient taking differently: Inject 80 Units into the skin nightly ) 5 pen 0    losartan (COZAAR) 50 MG tablet Take 1 tablet by mouth daily 30 tablet 3    metFORMIN (GLUCOPHAGE-XR) 500 MG extended release tablet TAKE ONE TABLET BY MOUTH TWICE A  tablet 0    furosemide (LASIX) 40 MG tablet TAKE ONE TABLET BY MOUTH DAILY 90 tablet 0    warfarin (COUMADIN) 5 MG tablet TAKE ONE TABLET BY MOUTH DAILY AS DIRECTED BY [facility] (Patient taking differently: 10mg on Thursday and 5mg on all other days) 90 tablet 0    potassium chloride (KLOR-CON M) 20 MEQ extended release tablet TAKE THREE TABLETS BY MOUTH DAILY.  PATIENT NEEDS APPOINTMENT 90 tablet 0    blood glucose test strips (JERZY CONTOUR TEST) strip USE 1 STRIP BY IN VITRO DAILY AS NEEDED 100 strip 5    PROAIR  (90 Base) MCG/ACT inhaler INHALE TWO PUFFS BY MOUTH EVERY 6 HOURS AS NEEDED FOR WHEEZING 1 Inhaler 2    Vitamin D, Cholecalciferol, 400 units TABS Take 1 tablet by mouth 2 times daily 180 tablet 2    HUMALOG KWIKPEN 100 UNIT/ML pen INJECT 20 elevates symmetrically. Normal gag reflex. CN XI: Shoulder shrug strength is normal.  CN XII: Tongue midline without atrophy or fasciculations. Motor  Normal muscle bulk throughout. No fasciculations present. Normal muscle tone. No abnormal involuntary movements. Strength is 5/5 throughout all four extremities. RLE4+/5. Sensory  Sensation: Decreased sensation distal lower extremities. Reflexes  Deep tendon reflexes are 2+ and symmetric in all four extremities with downgoing toes bilaterally. Coordination  Finger-to-nose, rapid alternating movements and heel-to-shin normal bilaterally without dysmetria. Gait  Not tested,on a walker  L femur fracture. ASSESSMENT / PLAN:     //40year-old female with multiple falls secondary to syncope, history of hypoglycemia blood glucose 44 in past,Hb A1c 7.2,DM,  Labile blood pressure with orthostatic hypotension in the past.  Diabetic neuropathy. MRI Brain negative. We will get MRI lumbar spine. Due to back pain, urinary incontinence. Continue compression stockings. Loop recorder interrogation per cardiology. Follow-up with PCP and endocrinologist for management of hypertension and diabetes. Follow-up in 4 months. Discussed with the attending. Ms. Letty Valladares received counseling on the following healthy behaviors: medical compliance, smoking cessation, blood pressure control, regular follow up with primary doctor.         Electronically signed by Jerilyn Spurling, MD on 10/8/2020 at 3:30 PM

## 2020-10-09 PROCEDURE — 2022F DILAT RTA XM EVC RTNOPTHY: CPT | Performed by: FAMILY MEDICINE

## 2020-10-09 PROCEDURE — G8427 DOCREV CUR MEDS BY ELIG CLIN: HCPCS | Performed by: FAMILY MEDICINE

## 2020-10-09 PROCEDURE — 3017F COLORECTAL CA SCREEN DOC REV: CPT | Performed by: FAMILY MEDICINE

## 2020-10-09 PROCEDURE — 1090F PRES/ABSN URINE INCON ASSESS: CPT | Performed by: FAMILY MEDICINE

## 2020-10-09 PROCEDURE — 1123F ACP DISCUSS/DSCN MKR DOCD: CPT | Performed by: FAMILY MEDICINE

## 2020-10-09 PROCEDURE — G8417 CALC BMI ABV UP PARAM F/U: HCPCS | Performed by: FAMILY MEDICINE

## 2020-10-09 PROCEDURE — G8399 PT W/DXA RESULTS DOCUMENT: HCPCS | Performed by: FAMILY MEDICINE

## 2020-10-09 PROCEDURE — G8484 FLU IMMUNIZE NO ADMIN: HCPCS | Performed by: FAMILY MEDICINE

## 2020-10-09 PROCEDURE — 4004F PT TOBACCO SCREEN RCVD TLK: CPT | Performed by: FAMILY MEDICINE

## 2020-10-09 PROCEDURE — 99215 OFFICE O/P EST HI 40 MIN: CPT | Performed by: FAMILY MEDICINE

## 2020-10-09 PROCEDURE — 3051F HG A1C>EQUAL 7.0%<8.0%: CPT | Performed by: FAMILY MEDICINE

## 2020-10-09 PROCEDURE — 4040F PNEUMOC VAC/ADMIN/RCVD: CPT | Performed by: FAMILY MEDICINE

## 2020-10-13 RX ORDER — PEN NEEDLE, DIABETIC 31 G X1/4"
NEEDLE, DISPOSABLE MISCELLANEOUS
Qty: 100 EACH | Refills: 5 | Status: SHIPPED | OUTPATIENT
Start: 2020-10-13

## 2020-10-13 NOTE — TELEPHONE ENCOUNTER
Josefina Parsons is calling to request a refill on the following medication(s):    Medication Request:  Requested Prescriptions     Pending Prescriptions Disp Refills    Insulin Pen Needle (PEN NEEDLES) 31G X 6 MM MISC [Pharmacy Med Name: [facility] PEN NEEDLES 31G 6MM] 100 each 0     Sig: USE WITH INSULIN TWICE DAILY     Sent   Last Visit Date (If Applicable):  3/17/3713    Next Visit Date:    Visit date not found

## 2020-10-14 ENCOUNTER — OFFICE VISIT (OUTPATIENT)
Dept: PODIATRY | Age: 75
End: 2020-10-14
Payer: MEDICARE

## 2020-10-14 VITALS
SYSTOLIC BLOOD PRESSURE: 139 MMHG | BODY MASS INDEX: 34.84 KG/M2 | HEART RATE: 93 BPM | HEIGHT: 67 IN | DIASTOLIC BLOOD PRESSURE: 76 MMHG | WEIGHT: 222 LBS

## 2020-10-14 PROCEDURE — 99213 OFFICE O/P EST LOW 20 MIN: CPT | Performed by: STUDENT IN AN ORGANIZED HEALTH CARE EDUCATION/TRAINING PROGRAM

## 2020-10-14 PROCEDURE — 99212 OFFICE O/P EST SF 10 MIN: CPT | Performed by: STUDENT IN AN ORGANIZED HEALTH CARE EDUCATION/TRAINING PROGRAM

## 2020-10-14 NOTE — PROGRESS NOTES
MISC USE WITH INSULIN TWICE DAILY 10/13/20  Yes Candace Stroud MD   amLODIPine (NORVASC) 10 MG tablet TAKE ONE TABLET BY MOUTH DAILY 9/28/20  Yes Candace Stroud MD   metoprolol succinate (TOPROL XL) 50 MG extended release tablet TAKE ONE TABLET BY MOUTH DAILY 9/8/20  Yes Candace Stroud MD   budesonide-formoterol (SYMBICORT) 160-4.5 MCG/ACT AERO INHALE TWO PUFFS BY MOUTH TWICE A DAY 8/27/20  Yes Candace Stroud MD   tiotropium (Anita Dewayne) 18 MCG inhalation capsule Inhale 1 capsule into the lungs daily 8/27/20  Yes Candace Stroud MD   insulin glargine (LANTUS SOLOSTAR) 100 UNIT/ML injection pen Inject 50 Units into the skin nightly  Patient taking differently: Inject 80 Units into the skin nightly  8/27/20  Yes Candace Stroud MD   losartan (COZAAR) 50 MG tablet Take 1 tablet by mouth daily 8/27/20  Yes Candace Stroud MD   metFORMIN (GLUCOPHAGE-XR) 500 MG extended release tablet TAKE ONE TABLET BY MOUTH TWICE A DAY 8/10/20  Yes Candace Stroud MD   furosemide (LASIX) 40 MG tablet TAKE ONE TABLET BY MOUTH DAILY 7/9/20  Yes Candace Stroud MD   warfarin (COUMADIN) 5 MG tablet TAKE ONE TABLET BY MOUTH DAILY AS DIRECTED BY [address]  Patient taking differently: 10mg on Thursday and 5mg on all other days 6/29/20  Yes Candace Stroud MD   potassium chloride (KLOR-CON M) 20 MEQ extended release tablet TAKE THREE TABLETS BY MOUTH DAILY.  PATIENT NEEDS APPOINTMENT 4/22/20  Yes Candace Stroud MD   blood glucose test strips (JERZY CONTOUR TEST) strip USE 1 STRIP BY IN VITRO DAILY AS NEEDED 2/3/20  Yes Candace Stroud MD   PROAIR  (80 Base) MCG/ACT inhaler INHALE TWO PUFFS BY MOUTH EVERY 6 HOURS AS NEEDED FOR WHEEZING 10/31/19  Yes Candace Stroud MD   Vitamin D, Cholecalciferol, 400 units TABS Take 1 tablet by mouth 2 times daily 9/25/19  Yes Candace Stroud MD   HUMALOG KWIKPEN 100 UNIT/ML pen INJECT 20 UNITS SUBCUTANEOUSLY DAILY  Patient taking differently: Sliding scale 9/21/19  Yes Candace Stroud MD Multiple Vitamin (DAILY-NESSA) TABS TAKE ONE TABLET BY MOUTH DAILY 19  Yes Rubi Farley MD       Objective     Vitals:    10/14/20 1404   BP: 139/76   Pulse: 93       Lab Results   Component Value Date    LABA1C 7.2 (H) 2020       Physical Exam:  General:  Alert and oriented x3. In no acute distress. Lower Extremity Physical Exam:    Vascular: DP and PT pulses are nonpalpable, Bilateral. CFT <3 seconds to all digits, Bilateral.  No edema, Bilateral.  Hair growth is absent to the level of the digits, Bilateral.     Neuro: Saph/sural/SP/DP/plantar sensation intact to light touch. Protective sensation is intact to 10/10 sites as tested with a 5.07g SWMF, Bilateral.    Musculoskeletal: EHL/FHL/GS/TA gross motor intact. Mild tenderness along the distal fibula appreciated. Gross deformity is absent, Bilateral.     Dermatologic: No open lesions, Bilateral.  Interdigital maceration absent, Bilateral.  Nails 1-10 are wnl. Clinical: None. Imagin2020  Impression    Persistent anterolateral soft tissue swelling         Limited visualization of the previously described nondisplaced distal fibular    shaft fracture             Assessment   Irma Abad is a 76 y.o. female with     Diagnosis Orders   1. Closed nondisplaced fracture of lateral malleolus of left fibula with routine healing, subsequent encounter  Darshan Rankin MD, Vascular Surgery, [place]    XR ANKLE LEFT (MIN 3 VIEWS)   2. PAD (peripheral artery disease) ([facility])  Darshan Rankin MD, Vascular Surgery, [place]    XR ANKLE LEFT (MIN 3 VIEWS)   3. DM (diabetes mellitus) type II, controlled, with peripheral vascular disorder ([facility] [address] [address]  Darshan Rankin MD, Vascular Surgery, [place]    XR ANKLE LEFT (MIN 3 VIEWS)        Plan   · Patient examined and evaluated. · Diagnosis and treatment options discussed in detail.   · New ankle x-rays ordered, instructed to evaluate fracture healing  · PVRs reviewed- tibial peroneal occlusive disease. Referral to vascular surgery for evaluation. · Patient instructed to slowly transition into normal shoe gear, letting pain be her guide. Instructed to switch back to CAM boot if she is experiencing significant pain. · Continue PT and begin WB exercises as well as ROM exercises   · Patient to RTC in 2 week(s) to evaluate for fracture healing. · Please, call the office with any questions or concerns. No orders of the defined types were placed in this encounter.     Orders Placed This Encounter   Procedures    XR ANKLE LEFT (MIN 3 VIEWS)     Standing Status:   Future     Standing Expiration Date:   10/14/2021     Order Specific Question:   Reason for exam:     Answer:   Evaluate for fracture healing   Rhenda Merlin, MD, Vascular Surgery, [place]     Referral Priority:   Routine     Referral Type:   Eval and Treat     Referral Reason:   Specialty Services Required     Referred to Provider:   Chani Smith MD     Requested Specialty:   Vascular Surgery     Number of Visits Requested:   1         Electronically signed by Judy Engel DPM on 10/14/2020 at 3:25 PM

## 2020-10-16 ENCOUNTER — TELEPHONE (OUTPATIENT)
Dept: VASCULAR SURGERY | Age: 75
End: 2020-10-16

## 2020-10-20 RX ORDER — INSULIN GLARGINE 100 [IU]/ML
80 INJECTION, SOLUTION SUBCUTANEOUS NIGHTLY
Qty: 15 PEN | Refills: 1 | Status: SHIPPED | OUTPATIENT
Start: 2020-10-20 | End: 2020-12-31

## 2020-10-20 NOTE — TELEPHONE ENCOUNTER
Mary Harrell is calling to request a refill on the following medication(s):    Medication Request:  Requested Prescriptions     Pending Prescriptions Disp Refills    LANTUS SOLOSTAR 100 UNIT/ML injection pen [Pharmacy Med Name: Daisy Blackmonbaum 100 UNIT/ML] 5 pen 0     Sig: INJECT 50 UNITS UNDER THE SKIN NIGHTLY     Last filled 8/27/20 for 5 pen no refill  Order pending      Last Visit Date (If Applicable):  2/12/6922    Next Visit Date:    Visit date not found

## 2020-10-21 ENCOUNTER — HOSPITAL ENCOUNTER (OUTPATIENT)
Dept: PHARMACY | Age: 75
Setting detail: THERAPIES SERIES
Discharge: HOME | End: 2020-10-21
Payer: MEDICARE

## 2020-10-21 LAB
INR BLD: 3.3
PROTIME: 39.3 SECONDS

## 2020-10-21 RX ORDER — POTASSIUM CHLORIDE 20 MEQ/1
20 TABLET, EXTENDED RELEASE ORAL DAILY
COMMUNITY
End: 2020-10-30 | Stop reason: SDUPTHER

## 2020-10-21 RX ORDER — METOPROLOL SUCCINATE 50 MG/1
100 TABLET, EXTENDED RELEASE ORAL DAILY
COMMUNITY
End: 2021-01-07

## 2020-10-21 RX ORDER — LANOLIN ALCOHOL/MO/W.PET/CERES
325 CREAM (GRAM) TOPICAL DAILY
COMMUNITY

## 2020-10-21 NOTE — PROGRESS NOTES
Home care provider, Laura Downing with [facility] ([phone]), states patient compliant all of the time with regimen. Pt was discharged from rehab on 9/18/20. An INR was supposed to be done last month by home care but no results were called into clinic as requested. No bleeding or thromboembolic side effects noted. No significant dietary changes. No significant recent illness or disease state changes. Metoprolol was increased from 50mg daily to 100mg daily. PT/INR done in patient's home by home care provider. INR is 3.3 which is supratherapeutic. Warfarin regimen will be continued at current dose of 10mg thurs and 5mg all other days as INR is very close to goal.    Will have next INR drawn in 1 week to ensure it does not continue to rise. Home nurse given dosing directions and follow up appointment. Progress note routed to referring physicians office.     CLINICAL PHARMACY CONSULT: MED RECONCILIATION/REVIEW ADDENDUM    For Pharmacy Admin Tracking Only    PHSO: No  Total # of Interventions Recommended: 0  - Maintenance Safety Lab Monitoring #: 1  Total Interventions Accepted: 0  Time Spent (min): [address], [address] [place] [address]

## 2020-10-23 NOTE — TELEPHONE ENCOUNTER
Spoke to the patient and she wants to have her daughter, Rut Bergman, call back next week to schedule consult.

## 2020-10-30 NOTE — TELEPHONE ENCOUNTER
Ryan Mirza is calling to request a refill on the following medication(s):    Last Visit Date (If Applicable):  0/90/2987    Next Visit Date:    Visit date not found    Medication Request:  Requested Prescriptions     Pending Prescriptions Disp Refills    potassium chloride (KLOR-CON M) 20 MEQ extended release tablet 60 tablet 2     Sig: Take 1 tablet by mouth daily

## 2020-11-01 RX ORDER — POTASSIUM CHLORIDE 20 MEQ/1
20 TABLET, EXTENDED RELEASE ORAL DAILY
Qty: 60 TABLET | Refills: 2 | Status: SHIPPED | OUTPATIENT
Start: 2020-11-01 | End: 2021-06-25

## 2020-11-03 ENCOUNTER — TELEPHONE (OUTPATIENT)
Dept: INTERNAL MEDICINE CLINIC | Age: 75
End: 2020-11-03

## 2020-11-03 RX ORDER — METFORMIN HYDROCHLORIDE 500 MG/1
TABLET, EXTENDED RELEASE ORAL
Qty: 180 TABLET | Refills: 0 | Status: SHIPPED | OUTPATIENT
Start: 2020-11-03 | End: 2021-03-08

## 2020-11-03 NOTE — TELEPHONE ENCOUNTER
Patient wants to know why its being changed now, she was here on 9/28/20 and no mention of change. Her last labs were from 8/27/20.

## 2020-11-03 NOTE — TELEPHONE ENCOUNTER
She may take potassium chloride 20 mEq twice daily.   Further recommendations to follow at next visit

## 2020-11-03 NOTE — TELEPHONE ENCOUNTER
Pharmacy calling asking about potassium chloride? The script that was sent over on 11/1/20 if for 1 tab daily but patient states that she has always been on 3 tabs daily?     please advise

## 2020-11-04 ENCOUNTER — OFFICE VISIT (OUTPATIENT)
Dept: PODIATRY | Age: 75
End: 2020-11-04
Payer: MEDICARE

## 2020-11-04 VITALS
HEART RATE: 102 BPM | DIASTOLIC BLOOD PRESSURE: 80 MMHG | WEIGHT: 220 LBS | SYSTOLIC BLOOD PRESSURE: 149 MMHG | BODY MASS INDEX: 34.46 KG/M2

## 2020-11-04 PROCEDURE — 4004F PT TOBACCO SCREEN RCVD TLK: CPT | Performed by: STUDENT IN AN ORGANIZED HEALTH CARE EDUCATION/TRAINING PROGRAM

## 2020-11-04 PROCEDURE — 99212 OFFICE O/P EST SF 10 MIN: CPT

## 2020-11-04 PROCEDURE — G8427 DOCREV CUR MEDS BY ELIG CLIN: HCPCS | Performed by: STUDENT IN AN ORGANIZED HEALTH CARE EDUCATION/TRAINING PROGRAM

## 2020-11-04 PROCEDURE — 1090F PRES/ABSN URINE INCON ASSESS: CPT | Performed by: STUDENT IN AN ORGANIZED HEALTH CARE EDUCATION/TRAINING PROGRAM

## 2020-11-04 PROCEDURE — 3017F COLORECTAL CA SCREEN DOC REV: CPT | Performed by: STUDENT IN AN ORGANIZED HEALTH CARE EDUCATION/TRAINING PROGRAM

## 2020-11-04 PROCEDURE — 99213 OFFICE O/P EST LOW 20 MIN: CPT | Performed by: STUDENT IN AN ORGANIZED HEALTH CARE EDUCATION/TRAINING PROGRAM

## 2020-11-04 PROCEDURE — G8484 FLU IMMUNIZE NO ADMIN: HCPCS | Performed by: STUDENT IN AN ORGANIZED HEALTH CARE EDUCATION/TRAINING PROGRAM

## 2020-11-04 PROCEDURE — G8399 PT W/DXA RESULTS DOCUMENT: HCPCS | Performed by: STUDENT IN AN ORGANIZED HEALTH CARE EDUCATION/TRAINING PROGRAM

## 2020-11-04 PROCEDURE — G8417 CALC BMI ABV UP PARAM F/U: HCPCS | Performed by: STUDENT IN AN ORGANIZED HEALTH CARE EDUCATION/TRAINING PROGRAM

## 2020-11-04 PROCEDURE — 4040F PNEUMOC VAC/ADMIN/RCVD: CPT | Performed by: STUDENT IN AN ORGANIZED HEALTH CARE EDUCATION/TRAINING PROGRAM

## 2020-11-04 PROCEDURE — 1123F ACP DISCUSS/DSCN MKR DOCD: CPT | Performed by: STUDENT IN AN ORGANIZED HEALTH CARE EDUCATION/TRAINING PROGRAM

## 2020-11-04 NOTE — PROGRESS NOTES
[facility]  [address], [address]  Tel: [phone]   Fax: [phone]    Subjective     CC: Follow up L ankle pain    HPI:  Regis Montiel is a 76y.o. year old female who presents to clinic today for follow up of a closed, nondisplaced fracture of her left distal fibula. The original date of injury was 8/23/2020. She has been compliant with wearing her cam boot while ambulating. She has been working with PT doing progressive weightbearing and range of motion exercises. Patient presents today with her daughter. Patient and patient's daughter both relate the patient is doing very well and feels that she is nearly completely recovered. She denies any trauma or new complaints in the interim. Primary care physician is Lorelei Steven MD.    ROS:    Constitutional: Denies nausea, vomiting, fever, chills. Neurologic: Denies numbness, tingling, and burning in the feet. Vascular: Denies symptoms of lower extremity claudication. Skin: Denies open wounds. Otherwise negative except as noted in the HPI.      PMH:  Past Medical History:   Diagnosis Date    Anemia     Chronic obstructive pulmonary disease ([facility] 3/26/2018    Diabetes mellitus ([facility]     GERD (gastroesophageal reflux disease)     Hypertension     Other hyperlipidemia 6/39/3704    Systolic congestive heart failure ([facility] 12/17/2019       Surgical History:   Past Surgical History:   Procedure Laterality Date    COLONOSCOPY      HYSTERECTOMY      TONSILLECTOMY      TUBAL LIGATION      WISDOM TOOTH EXTRACTION         Social History:  Social History     Tobacco Use    Smoking status: Current Every Day Smoker     Packs/day: 1.00     Years: 30.00     Pack years: 30.00     Types: Cigarettes     Start date: 1965    Smokeless tobacco: Never Used   Substance Use Topics    Alcohol use: Yes     Comment: social    Drug use: No       Medications:  Prior to Admission medications    Medication [employer] Date End Date Taking?  Authorizing Provider   metFORMIN (GLUCOPHAGE-XR) 500 MG extended release tablet TAKE ONE TABLET BY MOUTH TWICE A DAY 11/3/20  Yes Geneva Webb MD   potassium chloride (KLOR-CON M) 20 MEQ extended release tablet Take 1 tablet by mouth daily 11/1/20  Yes Geneva Webb MD   metoprolol succinate (TOPROL XL) 50 MG extended release tablet Take 100 mg by mouth daily   Yes Historical Provider, MD   ferrous sulfate (FE TABS 325) 325 (65 Fe) MG EC tablet Take 325 mg by mouth daily   Yes Historical Provider, MD   insulin glargine (LANTUS SOLOSTAR) 100 UNIT/ML injection pen Inject 80 Units into the skin nightly  Patient taking differently: Inject 56 Units into the skin nightly  10/20/20  Yes Geneva Webb MD   Insulin Pen Needle (PEN NEEDLES) 31G X 6 MM MISC USE WITH INSULIN TWICE DAILY 10/13/20  Yes Geneva Webb MD   amLODIPine (NORVASC) 10 MG tablet TAKE ONE TABLET BY MOUTH DAILY 9/28/20  Yes Geneva Webb MD   budesonide-formoterol (SYMBICORT) 160-4.5 MCG/ACT AERO INHALE TWO PUFFS BY MOUTH TWICE A DAY 8/27/20  Yes Geneva Webb MD   tiotropium (Radha Sinks) 18 MCG inhalation capsule Inhale 1 capsule into the lungs daily 8/27/20  Yes Geneva Webb MD   losartan (COZAAR) 50 MG tablet Take 1 tablet by mouth daily 8/27/20  Yes Geneva Webb MD   furosemide (LASIX) 40 MG tablet TAKE ONE TABLET BY MOUTH DAILY 7/9/20  Yes Geneva Webb MD   warfarin (COUMADIN) 5 MG tablet TAKE ONE TABLET BY MOUTH DAILY AS DIRECTED BY [address]  Patient taking differently: 10mg on Thursday and 5mg on all other days 6/29/20  Yes Geneva Webb MD   blood glucose test strips (JERZY CONTOUR TEST) strip USE 1 STRIP BY IN VITRO DAILY AS NEEDED 2/3/20  Yes Geneva Webb MD   PROAIR  (90 Base) MCG/ACT inhaler INHALE TWO PUFFS BY MOUTH EVERY 6 HOURS AS NEEDED FOR WHEEZING 10/31/19  Yes Geneva Webb MD   Vitamin D, Cholecalciferol, 400 units TABS Take 1 tablet by mouth 2 times daily 9/25/19  Yes Geneva Webb MD   HUMALOG KWIKPEN 100 UNIT/ML pen INJECT 20 UNITS SUBCUTANEOUSLY DAILY  Patient taking differently: Sliding scale 19  Yes Geneva Webb MD   Multiple Vitamin (DAILY-NESSA) TABS TAKE ONE TABLET BY MOUTH DAILY 19  Yes Geneva Webb MD       Objective     Vitals:    20 1354   BP: (!) 149/80   Pulse: 102       Lab Results   Component Value Date    LABA1C 7.2 (H) 2020       Physical Exam:  General:  Alert and oriented x3. In no acute distress. Lower Extremity Physical Exam:    Vascular: DP and PT pulses are nonpalpable, Bilateral. CFT <3 seconds to all digits, Bilateral.  No edema, Bilateral.  Hair growth is absent to the level of the digits, Bilateral.     Neuro: Saph/sural/SP/DP/plantar sensation intact to light touch. Protective sensation is intact to 10/10 sites as tested with a 5.07g SWMF, Bilateral.    Musculoskeletal: EHL/FHL/GS/TA gross motor intact. No pain with palpation of distal fibula. Gross deformity is absent, Bilateral.     Dermatologic: No open lesions, Bilateral.  Interdigital maceration absent, Bilateral.  Nails 1-10 are wnl. Clinical: None. Imagin2020  Impression    Persistent anterolateral soft tissue swelling         Limited visualization of the previously described nondisplaced distal fibular    shaft fracture             Assessment   Aaron Ray is a 76 y.o. female with     Diagnosis Orders   1. Closed nondisplaced fracture of lateral malleolus of left fibula with routine healing, subsequent encounter          Plan   · Patient examined and evaluated. · Diagnosis and treatment options discussed in detail. · X-rays of the left ankle reviewed--demonstrate osseous trabeculation consistent with fracture healing across distal fibula. · Patient to follow-up with Dr. Randell Dodson for further vascular evaluation  · In regards to fracture of her left distal fibula, the fracture has completely healed.   The patient has progressed very well in the interim. She has continued to progress through physical therapy which she states she will finish this month. She has returned to ambulation at baseline activity and normal shoe gear. · Patient instructed to use cam boot should any pain arise. · Continue PT through the end of the month. · Patient to RTC in 3 months for final evaluation. · Please, call the office with any questions or concerns. No orders of the defined types were placed in this encounter. No orders of the defined types were placed in this encounter.         Electronically signed by Marty Anthony DPM on 11/4/2020 at 2:58 PM

## 2020-11-04 NOTE — PROGRESS NOTES
Patient instructed to remove shoes and socks and instructed to sit in exam chair. Current PCP is Maryana Lujan MD and date of last visit was 09 08 2020. Do you have a follow up visit scheduled?   Yes and No  If yes, the date is

## 2020-11-13 ENCOUNTER — TELEPHONE (OUTPATIENT)
Dept: PHARMACY | Age: 75
End: 2020-11-13

## 2020-11-13 LAB — INR BLD: 2.6

## 2020-11-13 NOTE — TELEPHONE ENCOUNTER
Called to check status of INR draw as we have no results since 10/21 and were supposed to have f/u INR in 1 week. Per Tamiko previous nurse Bella Freitas relayed orders to LNP who was going to home last time but INR did not get done. RN going today to get INR.   IF critical will call the hospital.

## 2020-11-16 ENCOUNTER — HOSPITAL ENCOUNTER (OUTPATIENT)
Dept: PHARMACY | Age: 75
Setting detail: THERAPIES SERIES
Discharge: HOME | End: 2020-11-16
Payer: MEDICARE

## 2020-11-16 NOTE — PROGRESS NOTES
Home care provider, Александр Michelle with [facility] ([phone]), states patient compliant all of the time with regimen. No bleeding or thromboembolic side effects noted. No significant med or dietary changes. No significant recent illness or disease state changes. PT/INR done in patient's home by home care provider. INR is 2.6 which is therapeutic. Warfarin regimen will be continued at current dose of 10mg Thursday and 5mg all other days. Will have next INR drawn on Monday November 30th. Home nurse given dosing directions and follow up appointment. Progress note routed to referring physicians office.     CLINICAL PHARMACY CONSULT: MED RECONCILIATION/REVIEW ADDENDUM    For Pharmacy Admin Tracking Only    PHSO: No  Total # of Interventions Recommended: 0  - Maintenance Safety Lab Monitoring #: 1  Total Interventions Accepted: 0  Time Spent (min): [address] PharmD

## 2020-11-28 LAB — INR BLD: 4.4

## 2020-11-30 ENCOUNTER — HOSPITAL ENCOUNTER (OUTPATIENT)
Dept: PHARMACY | Age: 75
Setting detail: THERAPIES SERIES
Discharge: HOME | End: 2020-11-30
Payer: MEDICARE

## 2020-11-30 PROCEDURE — 99212 OFFICE O/P EST SF 10 MIN: CPT

## 2020-11-30 NOTE — PROGRESS NOTES
Home care provider, Radha Hutchinson, called with INR results that were obtained Saturday 11/28/20. Orders to get INR on Friday 11/27/20 were not able to be completed as patient had gone to ED at [facility] for CHF exacerbation. Radha Jasper states she was not able to get INR results (or if they were even obtained) from [facility]. She was able to visit the home Saturday to get INR. I called and spoke directly to patient who states she has been compliant all of the time with regimen. No bleeding or thromboembolic side effects noted. No significant dietary changes. She was given a water pill for swelling which resulted from CHF. PT/INR done in patient's home by home care provider. INR is 4.4 which is supratherapeutic likely from CHF exacerbation. Warfarin regimen was held for Saturday and Sunday. Patients home dose will be resumed today with 10mg Thursday and 5mg all other days. Will have next INR drawn in 1 week on Monday 12/7/20 - results to [address] [place] [address]. Home nurse given dosing directions and follow up appointment. Progress note routed to referring physicians office.     CLINICAL PHARMACY CONSULT: MED RECONCILIATION/REVIEW ADDENDUM    For Pharmacy Admin Tracking Only    PHSO: No  Total # of Interventions Recommended: 0  - Maintenance Safety Lab Monitoring #: 1  Total Interventions Accepted: 0  Time Spent (min): [address] PharmD

## 2020-12-01 RX ORDER — FUROSEMIDE 40 MG/1
TABLET ORAL
Qty: 90 TABLET | Refills: 0 | Status: SHIPPED | OUTPATIENT
Start: 2020-12-01 | End: 2021-02-23

## 2020-12-07 ENCOUNTER — APPOINTMENT (OUTPATIENT)
Dept: PHARMACY | Age: 75
End: 2020-12-07
Payer: MEDICARE

## 2020-12-09 ENCOUNTER — HOSPITAL ENCOUNTER (OUTPATIENT)
Dept: PHARMACY | Age: 75
Setting detail: THERAPIES SERIES
Discharge: HOME | End: 2020-12-09
Payer: MEDICARE

## 2020-12-09 LAB — INR BLD: 2.1

## 2020-12-09 PROCEDURE — 99211 OFF/OP EST MAY X REQ PHY/QHP: CPT

## 2020-12-09 NOTE — PROGRESS NOTES
Home care provider, Shawn Villatoro with [facility] ([phone]), called in INR today. I spoke directly with patient who states compliant all of the time with regimen. No bleeding or thromboembolic side effects noted. No significant med or dietary changes. No significant recent illness or disease state changes. PT/INR done in patient's home by home care provider. INR is 2 1 which is therapeutic. Warfarin regimen will be continued at current dose of 10mg thurs and 5mg all other days. Will have next INR drawn in 1 week on 12/16. Home nurse given dosing directions and follow up appointment. Progress note routed to referring physicians office.     CLINICAL PHARMACY CONSULT: MED RECONCILIATION/REVIEW ADDENDUM    For Pharmacy Admin Tracking Only    PHSO: No  Total # of Interventions Recommended: 0  - Maintenance Safety Lab Monitoring #: 1  Total Interventions Accepted: 0  Time Spent (min): [address], [address] [place] [address]

## 2020-12-16 ENCOUNTER — HOSPITAL ENCOUNTER (OUTPATIENT)
Dept: PHARMACY | Age: 75
Setting detail: THERAPIES SERIES
Discharge: HOME | End: 2020-12-16
Payer: MEDICARE

## 2020-12-16 LAB — INR BLD: 2.7

## 2020-12-16 PROCEDURE — 99211 OFF/OP EST MAY X REQ PHY/QHP: CPT

## 2020-12-16 NOTE — PROGRESS NOTES
Home care provider, Dominique Giordano, called in INR results for patient. I called and spoke with patient who states compliant all of the time with regimen. No bleeding or thromboembolic side effects noted. No significant med or dietary changes. No significant recent illness or disease state changes. PT/INR done in patient's home by home care provider. INR is 2.7 which is therapeutic. Warfarin regimen will be continued at current dose of 10mg thurs and 5mg all other days. Will have next INR drawn in 2 weeks. Home nurse and patient given dosing directions and follow up appointment. Progress note routed to referring physicians office.     CLINICAL PHARMACY CONSULT: MED RECONCILIATION/REVIEW ADDENDUM    For Pharmacy Admin Tracking Only    PHSO: No  Total # of Interventions Recommended: 0  - Maintenance Safety Lab Monitoring #: 1  Total Interventions Accepted: 0  Time Spent (min): [address], [address] [place] [address]

## 2020-12-23 ENCOUNTER — HOSPITAL ENCOUNTER (OUTPATIENT)
Dept: PHARMACY | Age: 75
Setting detail: THERAPIES SERIES
Discharge: HOME | End: 2020-12-23
Payer: MEDICARE

## 2020-12-23 LAB — INR BLD: 2.1

## 2020-12-23 NOTE — PROGRESS NOTES
Home care provider, Marly Sood, states patient compliant all of the time with regimen. No bleeding or thromboembolic side effects noted. No significant med or dietary changes. No significant recent illness or disease state changes. PT/INR done in patient's home by home care provider. INR is 2.1 which is therapeutic. Warfarin regimen will be continued at current dose of 10mg thurs and 5mg all other days. Will have next INR drawn in 2 weeks on 1/6/21. Home nurse given dosing directions and follow up appointment. Progress note routed to referring physicians office.     CLINICAL PHARMACY CONSULT: MED RECONCILIATION/REVIEW ADDENDUM    For Pharmacy Admin Tracking Only    PHSO: No  Total # of Interventions Recommended: 0  - Maintenance Safety Lab Monitoring #: 1  Total Interventions Accepted: 0  Time Spent (min): [address], [address] [place] [address]

## 2020-12-29 NOTE — TELEPHONE ENCOUNTER
Josefina Parsons is calling to request a refill on the following medication(s):    Medication Request:  Requested Prescriptions     Pending Prescriptions Disp Refills    warfarin (COUMADIN) 5 MG tablet [Pharmacy Med Name: WARFARIN SODIUM 5 MG TABLET] 90 tablet 0     Sig: TAKE ONE TABLET BY MOUTH DAILY AS DIRECTED BY [facility]       Last Visit Date (If Applicable):  2/70/9899    Next Visit Date:    Visit date not found

## 2020-12-30 RX ORDER — WARFARIN SODIUM 5 MG/1
TABLET ORAL
Qty: 90 TABLET | Refills: 0 | Status: SHIPPED | OUTPATIENT
Start: 2020-12-30 | End: 2021-03-08

## 2020-12-31 RX ORDER — INSULIN GLARGINE 100 [IU]/ML
56 INJECTION, SOLUTION SUBCUTANEOUS NIGHTLY
Qty: 5 PEN | Refills: 0 | Status: SHIPPED | OUTPATIENT
Start: 2020-12-31 | End: 2021-02-04

## 2020-12-31 NOTE — TELEPHONE ENCOUNTER
Regis Montiel is calling to request a refill on the following medication(s):    Medication Request:  Requested Prescriptions     Pending Prescriptions Disp Refills    LANTUS SOLOSTAR 100 UNIT/ML injection pen [Pharmacy Med Name: Nhi Penaloza 100 UNIT/ML] 5 pen 0     Sig: INJECT 80 UNITS UNDER THE SKIN ONCE NIGHTLY       Last Visit Date (If Applicable):  5/42/4059    Next Visit Date:    Visit date not found

## 2021-01-04 ENCOUNTER — INITIAL CONSULT (OUTPATIENT)
Dept: VASCULAR SURGERY | Age: 76
End: 2021-01-04
Payer: MEDICARE

## 2021-01-04 VITALS
HEART RATE: 79 BPM | WEIGHT: 224 LBS | HEIGHT: 67 IN | BODY MASS INDEX: 35.16 KG/M2 | TEMPERATURE: 98.1 F | DIASTOLIC BLOOD PRESSURE: 79 MMHG | RESPIRATION RATE: 18 BRPM | SYSTOLIC BLOOD PRESSURE: 149 MMHG | OXYGEN SATURATION: 94 %

## 2021-01-04 DIAGNOSIS — M79.89 SWELLING OF BOTH LOWER EXTREMITIES: ICD-10-CM

## 2021-01-04 DIAGNOSIS — I73.9 PAD (PERIPHERAL ARTERY DISEASE) (HCC): Primary | ICD-10-CM

## 2021-01-04 PROCEDURE — 4040F PNEUMOC VAC/ADMIN/RCVD: CPT | Performed by: SURGERY

## 2021-01-04 PROCEDURE — G8399 PT W/DXA RESULTS DOCUMENT: HCPCS | Performed by: SURGERY

## 2021-01-04 PROCEDURE — G8427 DOCREV CUR MEDS BY ELIG CLIN: HCPCS | Performed by: SURGERY

## 2021-01-04 PROCEDURE — G8484 FLU IMMUNIZE NO ADMIN: HCPCS | Performed by: SURGERY

## 2021-01-04 PROCEDURE — G8417 CALC BMI ABV UP PARAM F/U: HCPCS | Performed by: SURGERY

## 2021-01-04 PROCEDURE — 1090F PRES/ABSN URINE INCON ASSESS: CPT | Performed by: SURGERY

## 2021-01-04 PROCEDURE — 4004F PT TOBACCO SCREEN RCVD TLK: CPT | Performed by: SURGERY

## 2021-01-04 PROCEDURE — 1123F ACP DISCUSS/DSCN MKR DOCD: CPT | Performed by: SURGERY

## 2021-01-04 PROCEDURE — 3017F COLORECTAL CA SCREEN DOC REV: CPT | Performed by: SURGERY

## 2021-01-04 PROCEDURE — 99204 OFFICE O/P NEW MOD 45 MIN: CPT | Performed by: SURGERY

## 2021-01-04 ASSESSMENT — ENCOUNTER SYMPTOMS
ABDOMINAL PAIN: 0
SHORTNESS OF BREATH: 0
COLOR CHANGE: 0
CHEST TIGHTNESS: 0
ALLERGIC/IMMUNOLOGIC NEGATIVE: 1

## 2021-01-04 NOTE — PROGRESS NOTES
Division of Vascular Surgery        New Consult      Physician Requesting Consult:  Ernesto Vick DPM    Reason for Consult:   PAD    Chief Complaint:      PAD    History of Present Illness:      Enrique Ro is a 76 y.o. woman who presents for evaluation of peripheral arterial disease noted on non-invasive PVR testing done by podiatry. She recently is recovering from a left foot fracture and has been cleared from physical therapy. She is very active and denies symptoms suggestive of lower extremity claudication or ischemic rest pain. She does have chronic swelling secondary to CHF. She does not have any open wounds or sores on her feet. Medical History:     Past Medical History:   Diagnosis Date    Anemia     Chronic obstructive pulmonary disease ([facility] 3/26/2018    Diabetes mellitus (HCC)     GERD (gastroesophageal reflux disease)     Hypertension     Other hyperlipidemia 8/40/6608    Systolic congestive heart failure ([facility] 12/17/2019       Surgical History:     Past Surgical History:   Procedure Laterality Date    COLONOSCOPY      HYSTERECTOMY      TONSILLECTOMY      TUBAL LIGATION      WISDOM TOOTH EXTRACTION         Family History:     Family History   Problem Relation Age of Onset    High Blood Pressure Mother     Cancer Mother     Diabetes Mother        Allergies:       Patient has no known allergies.     Medications:      Current Outpatient Medications   Medication Sig Dispense Refill    insulin glargine (LANTUS SOLOSTAR) 100 UNIT/ML injection pen Inject 56 Units into the skin nightly 5 pen 0    warfarin (COUMADIN) 5 MG tablet 10mg on Thursday and 5mg on all other days 90 tablet 0    furosemide (LASIX) 40 MG tablet One tablet daily 90 tablet 0    metFORMIN (GLUCOPHAGE-XR) 500 MG extended release tablet TAKE ONE TABLET BY MOUTH TWICE A  tablet 0    potassium chloride (KLOR-CON M) 20 MEQ extended release tablet Take 1 tablet by mouth daily 60 tablet 2    metoprolol succinate (TOPROL XL) 50 MG extended release tablet Take 100 mg by mouth daily      ferrous sulfate (FE TABS 325) 325 (65 Fe) MG EC tablet Take 325 mg by mouth daily      Insulin Pen Needle (PEN NEEDLES) 31G X 6 MM MISC USE WITH INSULIN TWICE DAILY 100 each 5    amLODIPine (NORVASC) 10 MG tablet TAKE ONE TABLET BY MOUTH DAILY 90 tablet 0    budesonide-formoterol (SYMBICORT) 160-4.5 MCG/ACT AERO INHALE TWO PUFFS BY MOUTH TWICE A DAY 1 Inhaler 2    tiotropium (SPIRIVA HANDIHALER) 18 MCG inhalation capsule Inhale 1 capsule into the lungs daily 30 capsule 5    losartan (COZAAR) 50 MG tablet Take 1 tablet by mouth daily 30 tablet 3    blood glucose test strips (JERZY CONTOUR TEST) strip USE 1 STRIP BY IN VITRO DAILY AS NEEDED 100 strip 5    PROAIR  (90 Base) MCG/ACT inhaler INHALE TWO PUFFS BY MOUTH EVERY 6 HOURS AS NEEDED FOR WHEEZING 1 Inhaler 2    Vitamin D, Cholecalciferol, 400 units TABS Take 1 tablet by mouth 2 times daily 180 tablet 2    HUMALOG KWIKPEN 100 UNIT/ML pen INJECT 20 UNITS SUBCUTANEOUSLY DAILY (Patient taking differently: Sliding scale) 6 pen 3    Multiple Vitamin (DAILY-NESSA) TABS TAKE ONE TABLET BY MOUTH DAILY 90 tablet 0     No current facility-administered medications for this visit. Social History:     Tobacco:    reports that she has been smoking cigarettes. She started smoking about 56 years ago. She has a 30.00 pack-year smoking history. She has never used smokeless tobacco.  Alcohol:      reports current alcohol use. Drug Use:  reports no history of drug use. Review of Systems:     Review of Systems   Constitutional: Negative for chills and fever. HENT: Negative for congestion. Eyes: Negative for visual disturbance. Respiratory: Negative for chest tightness and shortness of breath. Cardiovascular: Positive for leg swelling. Negative for chest pain. Gastrointestinal: Negative for abdominal pain. Endocrine: Negative. Genitourinary: Negative. Musculoskeletal: Positive for arthralgias. Skin: Negative for color change and wound. Allergic/Immunologic: Negative. Neurological: Negative for facial asymmetry, speech difficulty, weakness and numbness. Hematological: Negative. Psychiatric/Behavioral: Negative. Physical Exam:     Vitals:  BP (!) 149/79 (Site: Right Upper Arm, Position: Sitting, Cuff Size: Medium Adult)   Pulse 79   Temp 98.1 °F (36.7 °C) (Temporal)   Resp 18   Ht 5' 7\" (1.702 m)   Wt 224 lb (101.6 kg)   LMP  (LMP Unknown)   SpO2 94%   BMI 35.08 kg/m²     Physical Exam  Constitutional:       Appearance: She is well-developed and well-groomed. Eyes:      Extraocular Movements: Extraocular movements intact. Conjunctiva/sclera: Conjunctivae normal.   Neck:      Musculoskeletal: Full passive range of motion without pain. Vascular: No carotid bruit. Cardiovascular:      Rate and Rhythm: Normal rate and regular rhythm. Pulses:           Radial pulses are 2+ on the right side and 2+ on the left side. Dorsalis pedis pulses are detected w/ Doppler on the right side and detected w/ Doppler on the left side. Posterior tibial pulses are detected w/ Doppler on the right side and detected w/ Doppler on the left side. Pulmonary:      Effort: Pulmonary effort is normal. No respiratory distress. Abdominal:      Palpations: Abdomen is soft. Tenderness: There is no abdominal tenderness. Musculoskeletal:      Right lower leg: She exhibits swelling. She exhibits no tenderness. 1+ Pitting Edema present. Left lower leg: She exhibits swelling. She exhibits no tenderness. 1+ Pitting Edema present. Right foot: Normal capillary refill. No tenderness or swelling. Left foot: Normal capillary refill. No tenderness or swelling. Feet:      Right foot:      Skin integrity: No ulcer or skin breakdown. Left foot:      Skin integrity: No ulcer or skin breakdown.    Skin:     General: Skin is warm.      Capillary Refill: Capillary refill takes less than 2 seconds. Neurological:      Mental Status: She is alert and oriented to person, place, and time. GCS: GCS eye subscore is 4. GCS verbal subscore is 5. GCS motor subscore is 6. Sensory: Sensation is intact. Motor: Motor function is intact. Psychiatric:         Attention and Perception: Attention normal.         Mood and Affect: Mood normal.         Speech: Speech normal.         Behavior: Behavior normal.         Thought Content: Thought content normal.         Imaging/Labs:     Mildly abnormal waveforms, ELENA suggests mild vascular insuffiencey of bilateral lower extremities            Assessment and Plan:     Peripheral arterial disease, chronic lower extremity swelling  · Continue optimal medical therapy, with antiplatelet and statins  · Currently on coumadin for atrial fibrillation, getting re-evaluation to see if she still needs to be on it for episode of atrial fibrillation she had. If stopping she knows to restart aspirin  · Daily exercise to improve cardiovascular health  · Advised her to obtain compression stockings to help with chronic lower extremity swelling  · Follow up as needed    Electronically signed by Janice Graves MD on 1/4/21 at 12:52 PM [address]  Office: [phone]  Cell: [phone]  Email: [email or web address]

## 2021-01-06 ENCOUNTER — HOSPITAL ENCOUNTER (OUTPATIENT)
Dept: PHARMACY | Age: 76
Setting detail: THERAPIES SERIES
Discharge: HOME | End: 2021-01-06
Payer: MEDICARE

## 2021-01-06 DIAGNOSIS — I48.20 CHRONIC ATRIAL FIBRILLATION (HCC): ICD-10-CM

## 2021-01-06 DIAGNOSIS — I10 ESSENTIAL HYPERTENSION: ICD-10-CM

## 2021-01-06 LAB — INR BLD: 3.6

## 2021-01-06 PROCEDURE — 99212 OFFICE O/P EST SF 10 MIN: CPT

## 2021-01-07 RX ORDER — METOPROLOL SUCCINATE 50 MG/1
TABLET, EXTENDED RELEASE ORAL
Qty: 60 TABLET | Refills: 0 | Status: SHIPPED | OUTPATIENT
Start: 2021-01-07 | End: 2021-03-08

## 2021-01-07 RX ORDER — AMLODIPINE BESYLATE 10 MG/1
TABLET ORAL
Qty: 30 TABLET | Refills: 0 | Status: SHIPPED
Start: 2021-01-07 | End: 2021-03-17 | Stop reason: ALTCHOICE

## 2021-01-07 RX ORDER — WARFARIN SODIUM 5 MG/1
TABLET ORAL
Qty: 90 TABLET | Refills: 0 | OUTPATIENT
Start: 2021-01-07

## 2021-01-07 NOTE — TELEPHONE ENCOUNTER
Margot Lawler is calling to request a refill on the following medication(s):    Medication Request:  Requested Prescriptions     Pending Prescriptions Disp Refills    metoprolol succinate (TOPROL XL) 50 MG extended release tablet [Pharmacy Med Name: METOPROLOL SUCC ER 50 MG TAB] 60 tablet 0     Sig: TAKE ONE TABLET BY MOUTH TWICE A DAY     Last filled 9/8/20 90 day no refill    Last Visit Date (If Applicable):  8/81/9872    Next Visit Date:    Visit date not found

## 2021-01-20 ENCOUNTER — APPOINTMENT (OUTPATIENT)
Dept: PHARMACY | Age: 76
End: 2021-01-20
Payer: MEDICARE

## 2021-01-25 ENCOUNTER — APPOINTMENT (OUTPATIENT)
Dept: PHARMACY | Age: 76
End: 2021-01-25
Payer: MEDICARE

## 2021-01-28 ENCOUNTER — TELEPHONE (OUTPATIENT)
Dept: PHARMACY | Age: 76
End: 2021-01-28

## 2021-01-28 DIAGNOSIS — I48.20 CHRONIC ATRIAL FIBRILLATION (HCC): Primary | ICD-10-CM

## 2021-01-28 NOTE — TELEPHONE ENCOUNTER
Spoke to patient regarding INR - she was supposed to have INR drawn on 1/19/21 by Danisha Espinoza but it was not done. Danisha Espinoza is closed so I am unable to call to check on status. I called patient who will check on whether she can get a ride to come into this clinic for INR checks. She will call back next week.

## 2021-02-01 RX ORDER — METFORMIN HYDROCHLORIDE 500 MG/1
TABLET, EXTENDED RELEASE ORAL
Qty: 36 TABLET | Refills: 0 | OUTPATIENT
Start: 2021-02-01

## 2021-02-01 RX ORDER — INSULIN GLARGINE 100 [IU]/ML
56 INJECTION, SOLUTION SUBCUTANEOUS NIGHTLY
Qty: 5 PEN | Refills: 0 | OUTPATIENT
Start: 2021-02-01

## 2021-02-01 RX ORDER — FUROSEMIDE 40 MG/1
TABLET ORAL
Qty: 90 TABLET | Refills: 0 | OUTPATIENT
Start: 2021-02-01

## 2021-02-01 NOTE — TELEPHONE ENCOUNTER
Joshua Patterson is calling to request a refill on the following medication(s):    Medication Request:  Requested Prescriptions     Pending Prescriptions Disp Refills    furosemide (LASIX) 40 MG tablet [Pharmacy Med Name: FUROSEMIDE 40 MG TABLET] 90 tablet 0     Sig: TAKE ONE TABLET BY MOUTH DAILY    metFORMIN (GLUCOPHAGE-XR) 500 MG extended release tablet [Pharmacy Med Name: metFORMIN HCL  MG TABLET] 36 tablet 0     Sig: TAKE ONE TABLET BY MOUTH TWICE A DAY    LANTUS SOLOSTAR 100 UNIT/ML injection pen [Pharmacy Med Name: Lorella Back 100 UNIT/ML] 5 pen 0     Sig: INJECT 56 UNITS INTO THE SKIN NIGHTLY     Last filled lasix 12/1/20 90 day no refill  Last filled metformin 11/3/20 90 day no refill  Last filled lantus 12/31/20  Pt was told to make chiara, no chiara    Last Visit Date (If Applicable):  1/43/1811    Next Visit Date:    Visit date not found

## 2021-02-03 NOTE — TELEPHONE ENCOUNTER
Randal Amato is calling to request a refill on the following medication(s):    Medication Request:  Requested Prescriptions     Pending Prescriptions Disp Refills    LANTUS SOLOSTAR 100 UNIT/ML injection pen [Pharmacy Med Name: LANTUS SOLOSTAR 100 UNIT/ML] 5 pen 0     Sig: INJECT 56 UNITS INTO THE SKIN NIGHTLY     LAST FILLED 12/31/20 5 PENS, TOLD TO MAKE QASIM.  NO QASIM    Last Visit Date (If Applicable):  3/05/4090    Next Visit Date:    Visit date not found

## 2021-02-04 RX ORDER — INSULIN GLARGINE 100 [IU]/ML
56 INJECTION, SOLUTION SUBCUTANEOUS NIGHTLY
Qty: 5 PEN | Refills: 0 | Status: SHIPPED | OUTPATIENT
Start: 2021-02-04 | End: 2021-03-04

## 2021-02-16 NOTE — TELEPHONE ENCOUNTER
Amarjit Saldivar is calling to request a refill on the following medication(s):    Medication Request:    Last filled 8/10/2020 #180 with 0 RF    Requested Prescriptions     Pending Prescriptions Disp Refills    metFORMIN (GLUCOPHAGE-XR) 500 MG extended release tablet [Pharmacy Med Name: metFORMIN HCL  MG TABLET] 36 tablet 0     Sig: TAKE ONE TABLET BY MOUTH TWICE A DAY       Last Visit Date (If Applicable):  4/76/7110    Next Visit Date:    Visit date not found no

## 2021-02-23 RX ORDER — FUROSEMIDE 40 MG/1
TABLET ORAL
Qty: 90 TABLET | Refills: 0 | Status: SHIPPED | OUTPATIENT
Start: 2021-02-23 | End: 2021-05-26

## 2021-03-04 RX ORDER — INSULIN GLARGINE 100 [IU]/ML
INJECTION, SOLUTION SUBCUTANEOUS
Qty: 5 PEN | Refills: 0 | Status: SHIPPED | OUTPATIENT
Start: 2021-03-04 | End: 2021-04-01

## 2021-03-04 NOTE — TELEPHONE ENCOUNTER
Keyla Smith is calling to request a refill on the following medication(s):    Medication Request:  Requested Prescriptions     Pending Prescriptions Disp Refills    LANTUS SOLOSTAR 100 UNIT/ML injection pen [Pharmacy Med Name: Kylee Morse 100 UNIT/ML] 5 pen 0     Sig: INJECT 56 UNITS UNDER THE SKIN  NIGHTLY     Last filled 2/4/21 # 5 pens  Order pending for 1 month, she doesn't have enough until [facility]    Last Visit Date (If Applicable):  4/10/2791    Next Visit Date:    3/17/2021

## 2021-03-08 RX ORDER — METOPROLOL SUCCINATE 50 MG/1
TABLET, EXTENDED RELEASE ORAL
Qty: 90 TABLET | Refills: 1 | Status: SHIPPED | OUTPATIENT
Start: 2021-03-08 | End: 2021-03-11

## 2021-03-08 RX ORDER — METFORMIN HYDROCHLORIDE 500 MG/1
TABLET, EXTENDED RELEASE ORAL
Qty: 180 TABLET | Refills: 0 | Status: SHIPPED | OUTPATIENT
Start: 2021-03-08 | End: 2021-03-11

## 2021-03-08 RX ORDER — FUROSEMIDE 40 MG/1
TABLET ORAL
Qty: 90 TABLET | Refills: 0 | OUTPATIENT
Start: 2021-03-08

## 2021-03-08 RX ORDER — INSULIN GLARGINE 100 [IU]/ML
INJECTION, SOLUTION SUBCUTANEOUS
Qty: 5 PEN | Refills: 0 | OUTPATIENT
Start: 2021-03-08

## 2021-03-08 RX ORDER — WARFARIN SODIUM 5 MG/1
TABLET ORAL
Qty: 90 TABLET | Refills: 0 | Status: SHIPPED | OUTPATIENT
Start: 2021-03-08 | End: 2021-03-11

## 2021-03-08 RX ORDER — BUDESONIDE AND FORMOTEROL FUMARATE DIHYDRATE 160; 4.5 UG/1; UG/1
AEROSOL RESPIRATORY (INHALATION)
Qty: 10.2 G | Refills: 3 | Status: SHIPPED | OUTPATIENT
Start: 2021-03-08

## 2021-03-08 NOTE — TELEPHONE ENCOUNTER
Princess Burgess is calling to request a refill on the following medication(s):    Medication Request:  Requested Prescriptions     Pending Prescriptions Disp Refills    metoprolol succinate (TOPROL XL) 50 MG extended release tablet [Pharmacy Med Name: METOPROLOL SUCC ER 50 MG TAB] 60 tablet 0     Sig: TAKE ONE TABLET BY MOUTH TWICE A DAY    furosemide (LASIX) 40 MG tablet [Pharmacy Med Name: FUROSEMIDE 40 MG TABLET] 90 tablet 0     Sig: TAKE ONE TABLET BY MOUTH DAILY    budesonide-formoterol (SYMBICORT) 160-4.5 MCG/ACT AERO [Pharmacy Med Name: Frida Catina 160-4.5 MCG INHALER] 10.2 g 1     Sig: INHALE TWO PUFFS BY MOUTH TWICE A DAY    LANTUS SOLOSTAR 100 UNIT/ML injection pen [Pharmacy Med Name: Aylin Lilian 100 UNIT/ML] 5 pen 0     Sig: INJECT 56 UNITS UNDER THE SKIN  NIGHTLY    warfarin (COUMADIN) 5 MG tablet [Pharmacy Med Name: WARFARIN SODIUM 5 MG TABLET] 90 tablet 0     Sig: TAKE TWO TABLETS BY MOUTH DAILY ON THURSDAYS AND TAKE ONE TABLET BY MOUTH DAILY ON ALL OTHER DAYS    metFORMIN (GLUCOPHAGE-XR) 500 MG extended release tablet [Pharmacy Med Name: metFORMIN HCL  MG TABLET] 180 tablet 0     Sig: TAKE ONE TABLET BY MOUTH TWICE A DAY     Last filled metoprolol 1/7/21 30 day no refill,order pending 30 day  Last filled lasix 2/23/21 90 day no refill, not due  Last filled symbicort 8/27/20 30 day 2 refill  Order pending  Last filled lantus 3/4/21, not due  Last filled warfarin 12/30/20 90 day no refill, order pending  Last filled metformin 11/3/20 90 day no refill, order pending  Has [facility] 3/17/21    Last Visit Date (If Applicable):  4/30/4669    Next Visit Date:    3/17/2021

## 2021-03-10 NOTE — TELEPHONE ENCOUNTER
Chris Vigil is calling to request a refill on the following medication(s):    Last Visit Date (If Applicable):  7/40/6778    Next Visit Date:    3/17/2021    Medication Request:  Requested Prescriptions     Pending Prescriptions Disp Refills    warfarin (COUMADIN) 5 MG tablet [Pharmacy Med Name: WARFARIN SODIUM 5 MG TABLET] 90 tablet 0     Sig: TAKE TWO TABLETS BY MOUTH DAILY ON THURSDAYS AND TAKE ONE TABLET BY MOUTH DAILY ON ALL OTHER DAYS    metFORMIN (GLUCOPHAGE-XR) 500 MG extended release tablet [Pharmacy Med Name: metFORMIN HCL  MG TABLET] 36 tablet 0     Sig: TAKE ONE TABLET BY MOUTH TWICE A DAY    metoprolol succinate (TOPROL XL) 50 MG extended release tablet [Pharmacy Med Name: METOPROLOL SUCC ER 50 MG TAB] 60 tablet 0     Sig: TAKE ONE TABLET BY MOUTH TWICE A DAY

## 2021-03-11 RX ORDER — METFORMIN HYDROCHLORIDE 500 MG/1
TABLET, EXTENDED RELEASE ORAL
Qty: 36 TABLET | Refills: 0 | Status: SHIPPED | OUTPATIENT
Start: 2021-03-11 | End: 2021-05-27

## 2021-03-11 RX ORDER — WARFARIN SODIUM 5 MG/1
TABLET ORAL
Qty: 90 TABLET | Refills: 0 | Status: SHIPPED | OUTPATIENT
Start: 2021-03-11 | End: 2021-05-26

## 2021-03-11 RX ORDER — METOPROLOL SUCCINATE 50 MG/1
TABLET, EXTENDED RELEASE ORAL
Qty: 60 TABLET | Refills: 0 | Status: SHIPPED | OUTPATIENT
Start: 2021-03-11 | End: 2021-05-26

## 2021-03-17 ENCOUNTER — OFFICE VISIT (OUTPATIENT)
Dept: INTERNAL MEDICINE CLINIC | Age: 76
End: 2021-03-17
Payer: MEDICARE

## 2021-03-17 ENCOUNTER — HOSPITAL ENCOUNTER (OUTPATIENT)
Age: 76
Setting detail: SPECIMEN
Discharge: HOME | End: 2021-03-17
Payer: MEDICARE

## 2021-03-17 VITALS
DIASTOLIC BLOOD PRESSURE: 82 MMHG | OXYGEN SATURATION: 98 % | HEART RATE: 102 BPM | BODY MASS INDEX: 34.21 KG/M2 | RESPIRATION RATE: 20 BRPM | SYSTOLIC BLOOD PRESSURE: 150 MMHG | WEIGHT: 218 LBS | HEIGHT: 67 IN | TEMPERATURE: 97.3 F

## 2021-03-17 DIAGNOSIS — Z28.21 INFLUENZA VACCINATION DECLINED: ICD-10-CM

## 2021-03-17 DIAGNOSIS — D50.0 IRON DEFICIENCY ANEMIA DUE TO CHRONIC BLOOD LOSS: ICD-10-CM

## 2021-03-17 DIAGNOSIS — I87.2 EDEMA OF BOTH LOWER EXTREMITIES DUE TO PERIPHERAL VENOUS INSUFFICIENCY: ICD-10-CM

## 2021-03-17 DIAGNOSIS — Z99.81 OXYGEN DEPENDENT: ICD-10-CM

## 2021-03-17 DIAGNOSIS — K21.9 GASTROESOPHAGEAL REFLUX DISEASE WITHOUT ESOPHAGITIS: ICD-10-CM

## 2021-03-17 DIAGNOSIS — I10 ESSENTIAL HYPERTENSION: ICD-10-CM

## 2021-03-17 DIAGNOSIS — E10.9 CONTROLLED DIABETES MELLITUS TYPE 1 WITHOUT COMPLICATIONS (HCC): Primary | ICD-10-CM

## 2021-03-17 DIAGNOSIS — Z28.21 PNEUMOCOCCAL VACCINATION DECLINED: ICD-10-CM

## 2021-03-17 DIAGNOSIS — I50.22 CHRONIC SYSTOLIC CONGESTIVE HEART FAILURE (HCC): ICD-10-CM

## 2021-03-17 DIAGNOSIS — R42 VERTIGO: ICD-10-CM

## 2021-03-17 DIAGNOSIS — I48.20 CHRONIC ATRIAL FIBRILLATION (HCC): ICD-10-CM

## 2021-03-17 DIAGNOSIS — Z28.21 COVID-19 VIRUS VACCINATION DECLINED: ICD-10-CM

## 2021-03-17 DIAGNOSIS — F17.200 SMOKER: ICD-10-CM

## 2021-03-17 DIAGNOSIS — J44.9 CHRONIC OBSTRUCTIVE PULMONARY DISEASE, UNSPECIFIED COPD TYPE (HCC): ICD-10-CM

## 2021-03-17 DIAGNOSIS — E10.9 CONTROLLED DIABETES MELLITUS TYPE 1 WITHOUT COMPLICATIONS (HCC): ICD-10-CM

## 2021-03-17 DIAGNOSIS — D68.32 WARFARIN-INDUCED COAGULOPATHY (HCC): ICD-10-CM

## 2021-03-17 DIAGNOSIS — E78.49 OTHER HYPERLIPIDEMIA: ICD-10-CM

## 2021-03-17 DIAGNOSIS — T45.515A WARFARIN-INDUCED COAGULOPATHY (HCC): ICD-10-CM

## 2021-03-17 PROBLEM — R55 SYNCOPE AND COLLAPSE: Status: RESOLVED | Noted: 2020-08-23 | Resolved: 2021-03-17

## 2021-03-17 PROCEDURE — 99214 OFFICE O/P EST MOD 30 MIN: CPT | Performed by: FAMILY MEDICINE

## 2021-03-17 RX ORDER — AMLODIPINE BESYLATE 5 MG/1
TABLET ORAL
COMMUNITY
Start: 2021-01-04

## 2021-03-17 RX ORDER — SPIRONOLACTONE 25 MG/1
TABLET ORAL
COMMUNITY
Start: 2021-01-04 | End: 2022-03-10 | Stop reason: SDUPTHER

## 2021-03-17 ASSESSMENT — ENCOUNTER SYMPTOMS
GASTROINTESTINAL NEGATIVE: 1
RESPIRATORY NEGATIVE: 1
BACK PAIN: 1
EYES NEGATIVE: 1
ALLERGIC/IMMUNOLOGIC NEGATIVE: 1

## 2021-03-17 ASSESSMENT — PATIENT HEALTH QUESTIONNAIRE - PHQ9
SUM OF ALL RESPONSES TO PHQ QUESTIONS 1-9: 0
1. LITTLE INTEREST OR PLEASURE IN DOING THINGS: 0
SUM OF ALL RESPONSES TO PHQ QUESTIONS 1-9: 0
SUM OF ALL RESPONSES TO PHQ9 QUESTIONS 1 & 2: 0

## 2021-03-17 NOTE — PROGRESS NOTES
Subjective:      Patient ID: Qiana Loja is a 76 y.o. female. Diabetes  She presents for her follow-up diabetic visit. She has type 1 diabetes mellitus. Her disease course has been fluctuating. Hypoglycemia symptoms include nervousness/anxiousness. There are no diabetic associated symptoms. Symptoms are stable. Diabetic complications include heart disease and nephropathy. Risk factors for coronary artery disease include diabetes mellitus, dyslipidemia, obesity, sedentary lifestyle, post-menopausal and tobacco exposure. Current diabetic treatment includes insulin injections. She is compliant with treatment all of the time. Her weight is stable. Meal planning includes ADA exchanges, avoidance of concentrated sweets, calorie counting and carbohydrate counting. Home blood sugar record trend: She did not bring Accu-Chek log. An ACE inhibitor/angiotensin II receptor blocker is being taken. Review of Systems   Constitutional: Negative. HENT: Negative. Eyes: Negative. Respiratory: Negative. Cardiovascular: Negative. Gastrointestinal: Negative. Endocrine: Negative. Musculoskeletal: Positive for arthralgias and back pain. Skin: Negative. Allergic/Immunologic: Negative. Neurological: Negative. Hematological: Negative. Psychiatric/Behavioral: Positive for dysphoric mood. The patient is nervous/anxious. Past family and social history unremarkable. Diagnosis Orders   1. Controlled diabetes mellitus type 1 without complications (HCC)  Hemoglobin A1C   2. Chronic obstructive pulmonary disease, unspecified COPD type ([address]     3. Gastroesophageal reflux disease without esophagitis     4. Essential hypertension     5. Vertigo     6. Iron deficiency anemia due to chronic blood loss     7. Other hyperlipidemia     8. Influenza vaccination declined     9. Pneumococcal vaccination declined     10. Smoker     11.  Edema of both lower extremities due to peripheral venous insufficiency 12. Oxygen dependent     13. Chronic systolic congestive heart failure ([address]     14. Warfarin-induced coagulopathy ([facility]     15. Chronic atrial fibrillation (HCC)     16. COVID-19 virus vaccination declined           Objective:   Physical Exam  Vitals signs and nursing note reviewed. Constitutional:       Appearance: She is well-developed. HENT:      Head: Normocephalic and atraumatic. Right Ear: External ear normal.      Left Ear: External ear normal.   Eyes:      Conjunctiva/sclera: Conjunctivae normal.      Pupils: Pupils are equal, round, and reactive to light. Neck:      Musculoskeletal: Normal range of motion and neck supple. Cardiovascular:      Rate and Rhythm: Normal rate and regular rhythm. Heart sounds: Normal heart sounds. Comments: Coronary renal disease  Chronic A. fib  Hypertension  Hyperlipidemia  Chronic anticoagulation  Pulmonary:      Effort: Pulmonary effort is normal.      Breath sounds: Normal breath sounds. Comments: COPD-active smoker  Abdominal:      General: Bowel sounds are normal.      Palpations: Abdomen is soft. Comments: Deficiency anemia   Genitourinary:     Vagina: Normal.   Musculoskeletal: Normal range of motion. Comments: Degenerative polyarthralgia   Skin:     General: Skin is warm and dry. Neurological:      Mental Status: She is alert and oriented to person, place, and time. Deep Tendon Reflexes: Reflexes are normal and symmetric. Psychiatric:      Comments: Anxiety         Assessment:       Diagnosis Orders   1. Controlled diabetes mellitus type 1 without complications ([facility])  Hemoglobin A1C   2. Chronic obstructive pulmonary disease, unspecified COPD type ([facility]     3. Gastroesophageal reflux disease without esophagitis     4. Essential hypertension     5. Vertigo     6. Iron deficiency anemia due to chronic blood loss     7. Other hyperlipidemia     8. Influenza vaccination declined     9.  Pneumococcal vaccination declined 10. Smoker     11. Edema of both lower extremities due to peripheral venous insufficiency     12. Oxygen dependent     13. Chronic systolic congestive heart failure ([address]     14. Warfarin-induced coagulopathy ([address]     15. Chronic atrial fibrillation (HCC)     16. COVID-19 virus vaccination declined             Plan:      60-year-old -American female returns in follow-up. She is afebrile hemodynamically stable, clinical examination is stable  Insulin-dependent diabetes mellitus with A1c of 7.4 as of 8/2020. She is advised to continue current regimen, ADA 1800 diet, daily moderate exercise, keep Accu-Chek log for office review. We will recheck her A1c on the way out  Hypertension well-controlled with random blood pressure equal less than 130/80. Optimize diabetes and consume less than 2 g of salt a day  Hyperlipidemia on statin that she is tolerating well  Monofilament testing is unremarkable. She is counseled on diabetic foot care  She is advised to update her annual dilated eye exam  She continued to smoke despite advice, underlying history of COPD. Once again she is counseled, quit date, alternative to consider. Continue beta agonist and inhaled corticosteroid and supplemental O2  Chronic A. fib. Rate controlled. She is on Coumadin titrated by Coumadin clinic. INR is 2.1. She is counseled on Coumadin compatible diet  Iron deficiency anemia. H&H is stable at baseline  Once again she declined influenza, pneumococcal vaccine and COVID-19 vaccination   Med list and available labs reviewed, discussed with patient, questions answered  She is encouraged to call for any concern  This note is created with a voice recognition program and while intend to generate a document that accurately reflects the content of the visit, no guarantee can be provided that every mistake has been identified and corrected by editing.        Paulo Kumar MD

## 2021-03-18 LAB
ESTIMATED AVERAGE GLUCOSE: 249 MG/DL
HBA1C MFR BLD: 10.3 % (ref 4–6)

## 2021-03-22 RX ORDER — BLOOD SUGAR DIAGNOSTIC
STRIP MISCELLANEOUS
Qty: 200 STRIP | Refills: 0 | Status: SHIPPED | OUTPATIENT
Start: 2021-03-22 | End: 2021-11-29

## 2021-03-22 NOTE — TELEPHONE ENCOUNTER
Randal Amato is calling to request a refill on the following medication(s):    Medication Request:    Last filled 2/3/2020 #100 with 5 RF    Requested Prescriptions     Pending Prescriptions Disp Refills    blood glucose test strips (ONETOUCH VERIO) strip [Pharmacy Med Name: Galonelly Saleem TEST STRIP] 75 strip 4     Sig: USE ONE TEST STRIP DAILY AS NEEDED       Last Visit Date (If Applicable):  0/87/5278    Next Visit Date:    6/16/2021

## 2021-03-29 RX ORDER — INSULIN LISPRO 100 [IU]/ML
INJECTION, SOLUTION INTRAVENOUS; SUBCUTANEOUS
Qty: 6 PEN | Refills: 3 | Status: SHIPPED | OUTPATIENT
Start: 2021-03-29 | End: 2021-07-19 | Stop reason: SDUPTHER

## 2021-03-29 NOTE — TELEPHONE ENCOUNTER
Patient calling to request Humalog states she uses for sliding scale and  she hasn't needed it for awhile but recently ran out

## 2021-03-29 NOTE — TELEPHONE ENCOUNTER
Claudetta Loh is calling to request a refill on the following medication(s):    Last Visit Date (If Applicable):  7/16/4965    Next Visit Date:    6/16/2021    Medication Request:  Requested Prescriptions      No prescriptions requested or ordered in this encounter Procedure: 77646 - Carpal Tunnel Release Right  Tentative Date: jan  Tentative Time: To follow  Duration of Procedure: 1 hr  Hospital: [facility]  Anesthesia: Anesthesia Choice-gen vs thom block  NP/PA Assist: NO  Length of Stay: Outpatient  Equipment: kmi blade  Films needed for operating room: none needed  Preoperative Done By: PCP  Consent: To be signed at hospital  Postoperative Anticoagulation Treatment: None    No diagnosis found.    Home supplies to be ordered:     First postoperative visit at: [place]  Postoperative Therapy: None, will evaluate at post-op visit    Anesthesia Orders  NPO  Chest x-ray (CXR) within 6 months  Comprehensive Metabolic Panel  Complete blood count (CBC) with automated differential - if not done 14 days prior to admission  Urinalysis (UA) with culture  INR if patient is on coumadin  Electrocardiogram (EKG) within 6 months (males over 40 years old/females over 50 years old)  Type and screen for total joints  Urine pregnancy test for women of child-bearing age only  Medical consultation for preoperative risk assessment (if primary care provider does not perform)  Knee high ANTONIO (Thromboembolism-deterrent) stockings and sequential compression devices (SCDs) for all arthroplasties, arthroscopic shoulder surgeries and procedures scheduled for 1-1/2 hours or more    Orders:  1. [facility] for total knee/hip arthroplasty patients.  2. Nasal swab for methicillin-susceptible Staphylococcus aureus (MSSA) and Methicillin-resistant Staphylococcus aureus (MRSA) on all total joint replacements.  3. Physical Therapy (PT) for gait training with crutches and walker as needed.  4. PT or Occupational Therapy (OT) to evaluate and treat.  Cefazolin (Ancef) 1 gm intravenous (IV) preoperatively (if patient greater than 70 kg give Cefazolin 2 gm) within one hour prior to incision time.  If penicillin allergic, give Clindamycin 900 mg IV within one hour prior  to incision time for arthroscopic procedures.  If penicillin allergic, give Vancomycin 1 gm IV preoperatively for all arthroplasty procedures.

## 2021-04-01 RX ORDER — INSULIN GLARGINE 100 [IU]/ML
INJECTION, SOLUTION SUBCUTANEOUS
Qty: 15 PEN | Refills: 1 | Status: SHIPPED | OUTPATIENT
Start: 2021-04-01 | End: 2021-05-26

## 2021-04-01 NOTE — TELEPHONE ENCOUNTER
Carol Marcum is calling to request a refill on the following medication(s):    Medication Request:  Requested Prescriptions     Pending Prescriptions Disp Refills    LANTUS SOLOSTAR 100 UNIT/ML injection pen [Pharmacy Med Name: Seth Kelsy 100 UNIT/ML] 5 pen 0     Sig: INJECT 56 UNITS UNDER THE SKIN NIGHTLY     Last filled 3/4/21 1 month no refill  Order pending    Last Visit Date (If Applicable):  4/85/0182    Next Visit Date:    6/16/2021

## 2021-05-04 ENCOUNTER — TELEPHONE (OUTPATIENT)
Dept: INTERNAL MEDICINE CLINIC | Age: 76
End: 2021-05-04

## 2021-05-04 DIAGNOSIS — E10.9 CONTROLLED DIABETES MELLITUS TYPE 1 WITHOUT COMPLICATIONS (HCC): Primary | ICD-10-CM

## 2021-05-04 NOTE — TELEPHONE ENCOUNTER
Patient calling to see if she needs to have a referral to see an endocrinologist if so please send referral to Dr Manish Jhaveri at [facility] Fax# [phone]

## 2021-05-05 NOTE — TELEPHONE ENCOUNTER
Referral sent  Patient was seeing Dr Ajit Wilson but he is not in her network anymore  Patient notified

## 2021-05-05 NOTE — TELEPHONE ENCOUNTER
I thought she is established with Dr. Colin Holguin.   If she wished to go to [facility] endocrinology, that would be okay to proceed

## 2021-05-26 RX ORDER — WARFARIN SODIUM 5 MG/1
TABLET ORAL
Qty: 90 TABLET | Refills: 0 | Status: SHIPPED | OUTPATIENT
Start: 2021-05-26 | End: 2021-08-23

## 2021-05-26 RX ORDER — METOPROLOL SUCCINATE 50 MG/1
TABLET, EXTENDED RELEASE ORAL
Qty: 180 TABLET | Refills: 0 | Status: SHIPPED | OUTPATIENT
Start: 2021-05-26 | End: 2021-08-26

## 2021-05-26 RX ORDER — INSULIN GLARGINE 100 [IU]/ML
INJECTION, SOLUTION SUBCUTANEOUS
Qty: 15 PEN | Refills: 0 | Status: SHIPPED | OUTPATIENT
Start: 2021-05-26 | End: 2021-06-23

## 2021-05-26 RX ORDER — FUROSEMIDE 40 MG/1
TABLET ORAL
Qty: 90 TABLET | Refills: 0 | Status: SHIPPED | OUTPATIENT
Start: 2021-05-26 | End: 2021-08-26

## 2021-05-26 NOTE — TELEPHONE ENCOUNTER
Eladio Boyd is calling to request a refill on the following medication(s):    Medication Request:  Requested Prescriptions     Pending Prescriptions Disp Refills    metoprolol succinate (TOPROL XL) 50 MG extended release tablet [Pharmacy Med Name: METOPROLOL SUCC ER 50 MG TAB] 180 tablet 0     Sig: TAKE ONE TABLET BY MOUTH TWICE A DAY    warfarin (COUMADIN) 5 MG tablet [Pharmacy Med Name: WARFARIN SODIUM 5 MG TABLET] 90 tablet 0     Sig: TAKE TWO TABLETS BY MOUTH DAILY ON THURSDAYS AND TAKE ONE TABLET BY MOUTH DAILY ALL OTHER DAYS    LANTUS SOLOSTAR 100 UNIT/ML injection pen [Pharmacy Med Name: LANTUS SOLOSTAR 100 UNIT/ML] 5 pen 0     Sig: INJECT 56 UNITS UNDER THE SKIN NIGHTLY    furosemide (LASIX) 40 MG tablet [Pharmacy Med Name: FUROSEMIDE 40 MG TABLET] 90 tablet 0     Sig: TAKE ONE TABLET BY MOUTH DAILY     Last fill 2/23/21  Last Visit Date (If Applicable):  2/65/8140    Next Visit Date:    6/16/2021

## 2021-05-27 RX ORDER — METFORMIN HYDROCHLORIDE 500 MG/1
TABLET, EXTENDED RELEASE ORAL
Qty: 180 TABLET | Refills: 2 | Status: SHIPPED | OUTPATIENT
Start: 2021-05-27 | End: 2022-03-10 | Stop reason: SDUPTHER

## 2021-05-27 NOTE — TELEPHONE ENCOUNTER
Yoana Park is calling to request a refill on the following medication(s):    Medication Request:  Requested Prescriptions     Pending Prescriptions Disp Refills    metFORMIN (GLUCOPHAGE-XR) 500 MG extended release tablet [Pharmacy Med Name: metFORMIN HCL  MG TABLET] 36 tablet 0     Sig: TAKE ONE TABLET BY MOUTH TWICE A DAY     Last fill 3/11/21  Last Visit Date (If Applicable):  8/78/9819    Next Visit Date:    6/16/2021

## 2021-06-22 DIAGNOSIS — J45.20 MILD INTERMITTENT ASTHMA WITHOUT COMPLICATION: ICD-10-CM

## 2021-06-23 RX ORDER — INSULIN GLARGINE 100 [IU]/ML
INJECTION, SOLUTION SUBCUTANEOUS
Qty: 5 PEN | Refills: 0 | Status: SHIPPED
Start: 2021-06-23 | End: 2021-06-24 | Stop reason: CLARIF

## 2021-06-23 NOTE — TELEPHONE ENCOUNTER
Brianne Marcelino is calling to request a refill on the following medication(s):    Medication Request:  Requested Prescriptions     Pending Prescriptions Disp Refills    LANTUS SOLOSTAR 100 UNIT/ML injection pen [Pharmacy Med Name: LANTUS SOLOSTAR 100 UNIT/ML] 5 pen 0     Sig: INJECT 56 UNITS UNDER THE SKIN NIGHTLY    PROAIR  (90 Base) MCG/ACT inhaler [Pharmacy Med Name: Paul Monsalve HFA 90 MCG INHALER] 8.5 g 1     Sig: INHALE TWO PUFFS BY MOUTH EVERY 6 HOURS AS NEEDED FOR WHEEZING     Last filled     Last Visit Date (If Applicable):  4/03/0736    Next Visit Date:    7/19/2021

## 2021-06-24 RX ORDER — INSULIN GLARGINE 100 [IU]/ML
INJECTION, SOLUTION SUBCUTANEOUS
Qty: 17 PEN | Refills: 0 | Status: SHIPPED | OUTPATIENT
Start: 2021-06-24 | End: 2021-12-21 | Stop reason: SDUPTHER

## 2021-06-25 NOTE — TELEPHONE ENCOUNTER
Eladio Boyd is calling to request a refill on the following medication(s):    Medication Request:  Requested Prescriptions     Pending Prescriptions Disp Refills    KLOR-CON M20 20 MEQ extended release tablet [Pharmacy Med Name: KLOR-CON M20 TABLET] 60 tablet 0     Sig: TAKE ONE TABLET BY MOUTH TWICE A DAY     Last filled 11/1/20 # 61 with 2 refill    Last Visit Date (If Applicable):  2/65/0532    Next Visit Date:    7/19/2021

## 2021-06-28 ENCOUNTER — TELEPHONE (OUTPATIENT)
Dept: INTERNAL MEDICINE CLINIC | Age: 76
End: 2021-06-28

## 2021-06-28 RX ORDER — FUROSEMIDE 40 MG/1
TABLET ORAL
Qty: 90 TABLET | Refills: 0 | OUTPATIENT
Start: 2021-06-28

## 2021-06-28 RX ORDER — METOPROLOL SUCCINATE 50 MG/1
TABLET, EXTENDED RELEASE ORAL
Qty: 180 TABLET | Refills: 0 | OUTPATIENT
Start: 2021-06-28

## 2021-06-28 RX ORDER — POTASSIUM CHLORIDE 1500 MG/1
TABLET, EXTENDED RELEASE ORAL
Qty: 180 TABLET | Refills: 1 | Status: SHIPPED | OUTPATIENT
Start: 2021-06-28 | End: 2022-03-10 | Stop reason: SDUPTHER

## 2021-06-28 RX ORDER — WARFARIN SODIUM 5 MG/1
TABLET ORAL
Qty: 90 TABLET | Refills: 0 | OUTPATIENT
Start: 2021-06-28

## 2021-06-28 NOTE — TELEPHONE ENCOUNTER
Vandana Hughes is calling to request a refill on the following medication(s):    Medication Request:  Requested Prescriptions     Pending Prescriptions Disp Refills    metoprolol succinate (TOPROL XL) 50 MG extended release tablet [Pharmacy Med Name: METOPROLOL SUCC ER 50 MG TAB] 180 tablet 0     Sig: TAKE ONE TABLET BY MOUTH TWICE A DAY    warfarin (COUMADIN) 5 MG tablet [Pharmacy Med Name: WARFARIN SODIUM 5 MG TABLET] 90 tablet 0     Sig: TAKE TWO TABLETS BY MOUTH DAILY ON THURSDAYS AND TAKE ONE TABLET BY MOUTH DAILY ALL OTHER DAYS    furosemide (LASIX) 40 MG tablet [Pharmacy Med Name: FUROSEMIDE 40 MG TABLET] 90 tablet 0     Sig: TAKE ONE TABLET BY MOUTH DAILY     Last filled 5/26/21, 90 day no refill  Pt can get refill at [facility]    Last Visit Date (If Applicable):  8/41/6933    Next Visit Date:    7/19/2021

## 2021-07-06 RX ORDER — POTASSIUM CHLORIDE 1500 MG/1
TABLET, EXTENDED RELEASE ORAL
Qty: 60 TABLET | Refills: 0 | OUTPATIENT
Start: 2021-07-06

## 2021-07-06 NOTE — TELEPHONE ENCOUNTER
Andreia Parent is calling to request a refill on the following medication(s):    Medication Request:  Requested Prescriptions     Pending Prescriptions Disp Refills    KLOR-CON M20 20 MEQ extended release tablet [Pharmacy Med Name: KLOR-CON M20 TABLET] 60 tablet 0     Sig: TAKE ONE TABLET BY MOUTH TWICE A DAY     Last filled 6/28/21 90 day 1 refill  Not due    Last Visit Date (If Applicable):  9/61/8155    Next Visit Date:    7/19/2021            .

## 2021-07-07 RX ORDER — ATORVASTATIN CALCIUM 40 MG/1
40 TABLET, FILM COATED ORAL DAILY
Qty: 90 TABLET | Refills: 1 | Status: SHIPPED | OUTPATIENT
Start: 2021-07-07 | End: 2022-03-10 | Stop reason: SDUPTHER

## 2021-07-08 NOTE — TELEPHONE ENCOUNTER
[facility] CLINICAL PHARMACY: STATIN THERAPY REVIEW    See and appreciate PCP's statin order for patient. Attempt made to reach patient to discuss with her - unable to leave message. Will send MyChart message and close encounter.      Umm Calderon, PharmD, [address], [address]  Direct: [phone]  Department, toll free: [phone], option 7     =======================================================    For Pharmacy Admin Tracking Only   Recommendation Provided To: Provider: 1 via Note to Provider   Intervention Detail: New Rx: 1, reason: Needs Additional Therapy   Gap Closed?: Yes    Total # of Interventions Recommended: 1   Total # of Interventions Accepted: 1   Intervention Accepted By: Provider: 1   Time Spent (min): 30
Please order atorvastatin 40 mg p.o. daily #90 with 1 refill
VM full  Unable to LM  Med ordered and routed to [facility] who initiated call  Pt informed
Non- : Yes      Diabetic: Yes      Tobacco smoker: Yes      Systolic Blood Pressure: 061 mmHg      Is BP treated: Yes      HDL Cholesterol: 56 mg/dL      Total Cholesterol: 131 mg/dL     Hyperlipidemia Goal: Patient is not prescribed high-intensity statin therapy. 2021 ADA Guidelines Age:   [facility] >/= 36years old:   o History of ASCVD or 10-year ASCVD risk > 20% - high-intensity statin is recommended. PLAN  No statin medication found in patient chart, but provider note dated 03/17/2021 states patient is on statin and tolerating well. [address] [place] [address] does not have statin on file for patient. Patient flagged in [address] [employer] system for statin need and pharmacy has discussed with patient several times but patient has refused. Will route note to provider. Carlos Mason  PharmD Candidate 2022    I have discussed the care of this patient with the student and I agree with the above as documented.      Callie Stroud, PharmD, [address] [facility], [address]   Direct: [phone]  Department, toll free: [phone], option 7

## 2021-07-13 NOTE — TELEPHONE ENCOUNTER
LVM for daughter Valerie Angela requesting update on patient since she has not had INR done in over 6 months with us. Clarifying if patient is still taking warfarin or being managed elsewhere.

## 2021-07-19 ENCOUNTER — OFFICE VISIT (OUTPATIENT)
Dept: INTERNAL MEDICINE CLINIC | Age: 76
End: 2021-07-19
Payer: MEDICARE

## 2021-07-19 ENCOUNTER — HOSPITAL ENCOUNTER (OUTPATIENT)
Age: 76
Setting detail: SPECIMEN
Discharge: HOME | End: 2021-07-19
Payer: MEDICARE

## 2021-07-19 VITALS
HEART RATE: 78 BPM | DIASTOLIC BLOOD PRESSURE: 76 MMHG | HEIGHT: 67 IN | WEIGHT: 222.2 LBS | BODY MASS INDEX: 34.88 KG/M2 | RESPIRATION RATE: 16 BRPM | TEMPERATURE: 97 F | SYSTOLIC BLOOD PRESSURE: 146 MMHG

## 2021-07-19 DIAGNOSIS — I48.20 CHRONIC ATRIAL FIBRILLATION (HCC): ICD-10-CM

## 2021-07-19 DIAGNOSIS — D68.32 WARFARIN-INDUCED COAGULOPATHY (HCC): ICD-10-CM

## 2021-07-19 DIAGNOSIS — Z28.21 INFLUENZA VACCINATION DECLINED: ICD-10-CM

## 2021-07-19 DIAGNOSIS — F17.200 SMOKER: ICD-10-CM

## 2021-07-19 DIAGNOSIS — Z99.81 OXYGEN DEPENDENT: ICD-10-CM

## 2021-07-19 DIAGNOSIS — I83.201: ICD-10-CM

## 2021-07-19 DIAGNOSIS — T45.515A WARFARIN-INDUCED COAGULOPATHY (HCC): ICD-10-CM

## 2021-07-19 DIAGNOSIS — I87.2 EDEMA OF BOTH LOWER EXTREMITIES DUE TO PERIPHERAL VENOUS INSUFFICIENCY: ICD-10-CM

## 2021-07-19 DIAGNOSIS — J44.9 CHRONIC OBSTRUCTIVE PULMONARY DISEASE, UNSPECIFIED COPD TYPE (HCC): ICD-10-CM

## 2021-07-19 DIAGNOSIS — E78.49 OTHER HYPERLIPIDEMIA: ICD-10-CM

## 2021-07-19 DIAGNOSIS — D50.0 IRON DEFICIENCY ANEMIA DUE TO CHRONIC BLOOD LOSS: ICD-10-CM

## 2021-07-19 DIAGNOSIS — I50.22 CHRONIC SYSTOLIC CONGESTIVE HEART FAILURE (HCC): ICD-10-CM

## 2021-07-19 DIAGNOSIS — E11.51 DM (DIABETES MELLITUS) TYPE II, CONTROLLED, WITH PERIPHERAL VASCULAR DISORDER (HCC): ICD-10-CM

## 2021-07-19 DIAGNOSIS — E10.9 CONTROLLED DIABETES MELLITUS TYPE 1 WITHOUT COMPLICATIONS (HCC): ICD-10-CM

## 2021-07-19 DIAGNOSIS — R42 VERTIGO: ICD-10-CM

## 2021-07-19 DIAGNOSIS — Z28.21 PNEUMOCOCCAL VACCINATION DECLINED: ICD-10-CM

## 2021-07-19 DIAGNOSIS — L97.104: ICD-10-CM

## 2021-07-19 DIAGNOSIS — I10 ESSENTIAL HYPERTENSION: Primary | ICD-10-CM

## 2021-07-19 LAB
ALBUMIN SERPL-MCNC: 3.8 G/DL (ref 3.5–5.2)
ALBUMIN/GLOBULIN RATIO: 0.9 (ref 1–2.5)
ALP BLD-CCNC: 152 U/L (ref 35–104)
ALT SERPL-CCNC: 9 U/L (ref 5–33)
ANION GAP SERPL CALCULATED.3IONS-SCNC: 14 MMOL/L (ref 9–17)
AST SERPL-CCNC: 14 U/L
BILIRUB SERPL-MCNC: 0.16 MG/DL (ref 0.3–1.2)
BUN BLDV-MCNC: 23 MG/DL (ref 8–23)
BUN/CREAT BLD: ABNORMAL (ref 9–20)
CALCIUM SERPL-MCNC: 9.7 MG/DL (ref 8.6–10.4)
CHLORIDE BLD-SCNC: 104 MMOL/L (ref 98–107)
CHOLESTEROL/HDL RATIO: 4.1
CHOLESTEROL: 188 MG/DL
CO2: 17 MMOL/L (ref 20–31)
CREAT SERPL-MCNC: 1.25 MG/DL (ref 0.5–0.9)
CREATININE URINE: 43.7 MG/DL (ref 28–217)
GFR AFRICAN AMERICAN: 51 ML/MIN
GFR NON-AFRICAN AMERICAN: 42 ML/MIN
GFR SERPL CREATININE-BSD FRML MDRD: ABNORMAL ML/MIN/{1.73_M2}
GFR SERPL CREATININE-BSD FRML MDRD: ABNORMAL ML/MIN/{1.73_M2}
GLUCOSE BLD-MCNC: 137 MG/DL (ref 70–99)
HCT VFR BLD CALC: 43.2 % (ref 36.3–47.1)
HDLC SERPL-MCNC: 46 MG/DL
HEMOGLOBIN: 12.9 G/DL (ref 11.9–15.1)
LDL CHOLESTEROL: 96 MG/DL (ref 0–130)
MCH RBC QN AUTO: 24.2 PG (ref 25.2–33.5)
MCHC RBC AUTO-ENTMCNC: 29.9 G/DL (ref 28.4–34.8)
MCV RBC AUTO: 81.2 FL (ref 82.6–102.9)
MICROALBUMIN/CREAT 24H UR: 98 MG/L
MICROALBUMIN/CREAT UR-RTO: 224 MCG/MG CREAT
NRBC AUTOMATED: 0 PER 100 WBC
PDW BLD-RTO: 17.9 % (ref 11.8–14.4)
PLATELET # BLD: 394 K/UL (ref 138–453)
PMV BLD AUTO: 12.3 FL (ref 8.1–13.5)
POTASSIUM SERPL-SCNC: 4.6 MMOL/L (ref 3.7–5.3)
RBC # BLD: 5.32 M/UL (ref 3.95–5.11)
SODIUM BLD-SCNC: 135 MMOL/L (ref 135–144)
TOTAL PROTEIN: 8 G/DL (ref 6.4–8.3)
TRIGL SERPL-MCNC: 229 MG/DL
TSH SERPL DL<=0.05 MIU/L-ACNC: 1.09 MIU/L (ref 0.3–5)
VLDLC SERPL CALC-MCNC: ABNORMAL MG/DL (ref 1–30)
WBC # BLD: 16.9 K/UL (ref 3.5–11.3)

## 2021-07-19 PROCEDURE — G8399 PT W/DXA RESULTS DOCUMENT: HCPCS | Performed by: FAMILY MEDICINE

## 2021-07-19 PROCEDURE — 99214 OFFICE O/P EST MOD 30 MIN: CPT | Performed by: FAMILY MEDICINE

## 2021-07-19 PROCEDURE — 4040F PNEUMOC VAC/ADMIN/RCVD: CPT | Performed by: FAMILY MEDICINE

## 2021-07-19 PROCEDURE — 2022F DILAT RTA XM EVC RTNOPTHY: CPT | Performed by: FAMILY MEDICINE

## 2021-07-19 PROCEDURE — 3017F COLORECTAL CA SCREEN DOC REV: CPT | Performed by: FAMILY MEDICINE

## 2021-07-19 PROCEDURE — 3023F SPIROM DOC REV: CPT | Performed by: FAMILY MEDICINE

## 2021-07-19 PROCEDURE — 3046F HEMOGLOBIN A1C LEVEL >9.0%: CPT | Performed by: FAMILY MEDICINE

## 2021-07-19 PROCEDURE — G8417 CALC BMI ABV UP PARAM F/U: HCPCS | Performed by: FAMILY MEDICINE

## 2021-07-19 PROCEDURE — 1090F PRES/ABSN URINE INCON ASSESS: CPT | Performed by: FAMILY MEDICINE

## 2021-07-19 PROCEDURE — G8427 DOCREV CUR MEDS BY ELIG CLIN: HCPCS | Performed by: FAMILY MEDICINE

## 2021-07-19 PROCEDURE — 1123F ACP DISCUSS/DSCN MKR DOCD: CPT | Performed by: FAMILY MEDICINE

## 2021-07-19 PROCEDURE — 4004F PT TOBACCO SCREEN RCVD TLK: CPT | Performed by: FAMILY MEDICINE

## 2021-07-19 PROCEDURE — G8926 SPIRO NO PERF OR DOC: HCPCS | Performed by: FAMILY MEDICINE

## 2021-07-19 RX ORDER — INSULIN LISPRO 100 [IU]/ML
INJECTION, SOLUTION INTRAVENOUS; SUBCUTANEOUS
Qty: 6 PEN | Refills: 3 | Status: SHIPPED | OUTPATIENT
Start: 2021-07-19 | End: 2022-08-16 | Stop reason: SDUPTHER

## 2021-07-19 SDOH — ECONOMIC STABILITY: FOOD INSECURITY: WITHIN THE PAST 12 MONTHS, THE FOOD YOU BOUGHT JUST DIDN'T LAST AND YOU DIDN'T HAVE MONEY TO GET MORE.: SOMETIMES TRUE

## 2021-07-19 SDOH — ECONOMIC STABILITY: FOOD INSECURITY: WITHIN THE PAST 12 MONTHS, YOU WORRIED THAT YOUR FOOD WOULD RUN OUT BEFORE YOU GOT MONEY TO BUY MORE.: SOMETIMES TRUE

## 2021-07-19 ASSESSMENT — SOCIAL DETERMINANTS OF HEALTH (SDOH): HOW HARD IS IT FOR YOU TO PAY FOR THE VERY BASICS LIKE FOOD, HOUSING, MEDICAL CARE, AND HEATING?: HARD

## 2021-07-19 ASSESSMENT — ENCOUNTER SYMPTOMS
GASTROINTESTINAL NEGATIVE: 1
BACK PAIN: 1
ALLERGIC/IMMUNOLOGIC NEGATIVE: 1
EYES NEGATIVE: 1
COUGH: 1

## 2021-07-19 ASSESSMENT — COPD QUESTIONNAIRES: COPD: 1

## 2021-07-19 NOTE — PROGRESS NOTES
Subjective:      Patient ID: Kimberly Simmons is a 76 y.o. female. COPD  She complains of cough. This is a chronic problem. The current episode started more than 1 month ago. The problem has been waxing and waning. The cough is non-productive. Her symptoms are aggravated by change in weather, exposure to fumes, exposure to smoke and pollen. Her symptoms are alleviated by beta-agonist, leukotriene antagonist and steroid inhaler. She reports moderate improvement on treatment. Risk factors for lung disease include smoking/tobacco exposure. Her past medical history is significant for COPD. Review of Systems   Constitutional: Negative. HENT: Negative. Eyes: Negative. Respiratory: Positive for cough. Cardiovascular: Negative. Gastrointestinal: Negative. Endocrine: Negative. Musculoskeletal: Positive for arthralgias and back pain. Skin: Negative. Allergic/Immunologic: Negative. Neurological: Negative. Hematological: Negative. Psychiatric/Behavioral: Positive for dysphoric mood. Past family and social history unremarkable. Diagnosis Orders   1. Essential hypertension     2. 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 DM (diabetes mellitus) type II, controlled, with peripheral vascular disorder (HCC)     4. Vertigo     5. Iron deficiency anemia due to chronic blood loss     6. Other hyperlipidemia  CBC    Comprehensive Metabolic Panel    Hemoglobin A1C    Lipid Panel    Microalbumin, Ur    TSH without Reflex   7. Chronic obstructive pulmonary disease, unspecified COPD type ([address]     8. Influenza vaccination declined     9. Pneumococcal vaccination declined     10. Smoker     11. Edema of both lower extremities due to peripheral venous insufficiency     12. Oxygen dependent     13. Chronic systolic congestive heart failure ([address]     14.  Warfarin-induced coagulopathy (HCC)  CBC    Comprehensive Metabolic Panel    Hemoglobin A1C    Lipid Panel    Microalbumin, Ur    TSH without Reflex   15. Chronic atrial fibrillation (HCC)  CBC    Comprehensive Metabolic Panel    Hemoglobin A1C    Lipid Panel    Microalbumin, Ur    TSH without Reflex   16. Controlled diabetes mellitus type 1 without complications (HCC)  CBC    Comprehensive Metabolic Panel    Hemoglobin A1C    Lipid Panel    Microalbumin, Ur    TSH without Reflex         Objective:   Physical Exam  Vitals and nursing note reviewed. Constitutional:       Appearance: She is well-developed. HENT:      Head: Normocephalic and atraumatic. Right Ear: External ear normal.      Left Ear: External ear normal.   Eyes:      Conjunctiva/sclera: Conjunctivae normal.      Pupils: Pupils are equal, round, and reactive to light. Cardiovascular:      Rate and Rhythm: Normal rate and regular rhythm. Heart sounds: Normal heart sounds. Comments: Uncontrolled insulin-dependent diabetes mellitus  Hypertension  Hyperlipidemia  Smoking  Coronary artery disease  Heart failure  Pulmonary:      Effort: Pulmonary effort is normal.      Breath sounds: Normal breath sounds. Comments: Home O2 dependent COPD-smoker  Abdominal:      General: Bowel sounds are normal.      Palpations: Abdomen is soft. Comments: GERD   Genitourinary:     Vagina: Normal.   Musculoskeletal:         General: Normal range of motion. Cervical back: Normal range of motion and neck supple. Comments: Degenerative polyarthralgia   Skin:     General: Skin is warm and dry. Neurological:      Mental Status: She is alert and oriented to person, place, and time. Deep Tendon Reflexes: Reflexes are normal and symmetric. Psychiatric:      Comments: Noncompliance to diet and schedule vaccination including COVID-19         Assessment:       Diagnosis Orders   1. Essential hypertension     2.  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 DM (diabetes mellitus) type II, controlled, with peripheral vascular disorder (HCC)     4. Vertigo     5. Iron deficiency anemia due to chronic blood loss     6. Other hyperlipidemia  CBC    Comprehensive Metabolic Panel    Hemoglobin A1C    Lipid Panel    Microalbumin, Ur    TSH without Reflex   7. Chronic obstructive pulmonary disease, unspecified COPD type ([facility]     8. Influenza vaccination declined     9. Pneumococcal vaccination declined     10. Smoker     11. Edema of both lower extremities due to peripheral venous insufficiency     12. Oxygen dependent     13. Chronic systolic congestive heart failure ([facility]     14. Warfarin-induced coagulopathy (HCC)  CBC    Comprehensive Metabolic Panel    Hemoglobin A1C    Lipid Panel    Microalbumin, Ur    TSH without Reflex   15. Chronic atrial fibrillation (HCC)  CBC    Comprehensive Metabolic Panel    Hemoglobin A1C    Lipid Panel    Microalbumin, Ur    TSH without Reflex   16. Controlled diabetes mellitus type 1 without complications (HCC)  CBC    Comprehensive Metabolic Panel    Hemoglobin A1C    Lipid Panel    Microalbumin, Ur    TSH without Reflex           Plan:      42-year-old -American female returns for follow-up. She is afebrile hemodynamically stable, clinical exam appears benign. She does not voice any new distress  Uncontrolled insulin-dependent diabetes mellitus with A1c of 10.3 back in 3/2021. She is known to be noncompliant to ADA 1800 diet, daily moderate exercise and lifestyle change. Once again she did not bring Accu-Chek log has always reminded. Compliance is advised. I will order follow-up labs including A1c on the way out. She is advised to bring Accu-Chek log at each visit  Hypertension. We aim to keep her blood pressure equal less than 130/80. Consume less than 2 g of salt a day.   Optimize diabetes, quit tobacco  Hyperlipidemia on statin that she is tolerating well  Systolic congestive

## 2021-07-20 LAB
ESTIMATED AVERAGE GLUCOSE: 237 MG/DL
HBA1C MFR BLD: 9.9 % (ref 4–6)

## 2021-07-21 ENCOUNTER — TELEPHONE (OUTPATIENT)
Dept: PHARMACY | Facility: CLINIC | Age: 76
End: 2021-07-21

## 2021-07-21 NOTE — TELEPHONE ENCOUNTER
Called pharmacy to confirm if prescription for Atorvastatin was received and picked up by patient. Pharmacy stated they did receive prescription but still awaiting . Called patient to remind to  but no answer and VM box was full. Will call again on 7/23/21.      Chris Anders, [address]   Department, toll free: [phone], option 7

## 2021-07-23 NOTE — TELEPHONE ENCOUNTER
Spoke with patient and advised that Atorvastatin was awaiting . Patient stated she would pick it up this weekend.      Tyrese Purchase, [address]   Department, toll free: [phone], option 7

## 2021-08-11 ENCOUNTER — CARE COORDINATION (OUTPATIENT)
Dept: CARE COORDINATION | Age: 76
End: 2021-08-11

## 2021-08-11 NOTE — CARE COORDINATION
Return call from patient's dtr Nikos Prado, states she took pt to [facility] on night of 8/9, pt c/o severe headache. Dtr states at [facility] there was a large amt of people waiting and so she took her mom to [facility]. Pt was disoriented, elevated BS and BP. Patient was admitted with a blood clot and brain bleed to the ICU. Patient is now stable and is talking. CM asked dtr to call when being d/c. CM will follow for d/c POC.

## 2021-08-20 ENCOUNTER — CARE COORDINATION (OUTPATIENT)
Dept: CARE COORDINATION | Age: 76
End: 2021-08-20

## 2021-08-20 NOTE — CARE COORDINATION
Outreach call to pt's dtr- Zenovia Halsted to get update on pt's admit to [facility] on 8/9/2021 with CVA, no answer, lvm to return call to this nurse at [phone]. If no return call CM will attempt to contact again next week.        Future Appointments   Date Time Provider Toni Warren   10/19/2021 11:30 AM MD Haroon Boone

## 2021-08-23 RX ORDER — WARFARIN SODIUM 5 MG/1
TABLET ORAL
Qty: 90 TABLET | Refills: 0 | Status: SHIPPED | OUTPATIENT
Start: 2021-08-23 | End: 2021-10-27 | Stop reason: ALTCHOICE

## 2021-08-23 NOTE — TELEPHONE ENCOUNTER
Horacio Jackson is calling to request a refill on the following medication(s):    Medication Request:    Last filled 5/26/2021 #90 with 0 RF    Requested Prescriptions     Pending Prescriptions Disp Refills    warfarin (COUMADIN) 5 MG tablet [Pharmacy Med Name: WARFARIN SODIUM 5 MG TABLET] 90 tablet 0     Sig: TAKE TWO TABLETS BY MOUTH DAILY ON THURSDAYS AND TAKE ONE TABLET BY MOUTH DAILY ALL OTHER DAYS       Last Visit Date (If Applicable):  1/20/6663    Next Visit Date:    10/19/2021

## 2021-08-25 ENCOUNTER — CARE COORDINATION (OUTPATIENT)
Dept: CARE COORDINATION | Age: 76
End: 2021-08-25

## 2021-08-25 NOTE — CARE COORDINATION
Call from patient's dtr Florentino Peng, states that pt was d/c [facility] on 8/20/21 and went to [facility].      ACM will update chart and remove from Care Team.

## 2021-08-26 RX ORDER — FUROSEMIDE 40 MG/1
TABLET ORAL
Qty: 90 TABLET | Refills: 0 | Status: SHIPPED | OUTPATIENT
Start: 2021-08-26 | End: 2022-03-10 | Stop reason: SDUPTHER

## 2021-08-26 RX ORDER — METOPROLOL SUCCINATE 50 MG/1
TABLET, EXTENDED RELEASE ORAL
Qty: 180 TABLET | Refills: 0 | Status: SHIPPED | OUTPATIENT
Start: 2021-08-26 | End: 2022-03-10 | Stop reason: SDUPTHER

## 2021-09-10 ENCOUNTER — TELEPHONE (OUTPATIENT)
Dept: INTERNAL MEDICINE CLINIC | Age: 76
End: 2021-09-10

## 2021-09-10 NOTE — TELEPHONE ENCOUNTER
----- Message from JENNIFER TRAN [facility] sent at 9/10/2021  8:52 AM EDT -----  Subject: Hospital Follow Up    QUESTIONS  What hospital was the Patient Discharged from? [facility]   Date of Discharge? 2021-09-10  Discharge Location? Home  Reason for hospitalization as patient stated? pt had a stroke   What question does the patient have, if applicable? needs to know if   provider will follow up with home care   ---------------------------------------------------------------------------  --------------  [address]  What is the best way for the office to contact you? Do not leave any   message, patient will call back for answer, OK to respond with electronic   message via [employer] portal (only for patients who have registered [employer]   account)  Preferred Call Back Phone Number? [phone]  ---------------------------------------------------------------------------  --------------  SCRIPT ANSWERS  Relationship to Patient? Third Party  Representative Name? [facility]   (Patient requests to see provider urgently. )? No  (Has the patient been discharged from the hospital within 2 business days   AND does not have a Telephone Encounter  Follow Up From [address] [place] [address]   documented in [address]?)? No  Do you have any questions for your primary care provider that need to be   answered prior to your appointment? (Use RN Triage if question pertains to   anything on the red flag list)? Yes  (Patient needs follow up visit after hospital discharge) Book first   available appointment within 7 days OF DISCHARGE, if no appt, proceed to   book the next available time slot within 14 days OF DISCHARGE AND Send   Message to Provider. [facility] Follow Up appointment cannot be booked   beyond 14 Days and should result in a Message to Provider. ?  Yes

## 2021-09-21 NOTE — TELEPHONE ENCOUNTER
Laurie Samuel called back and states pt has several appts in near future, wants to keep appt scheduled. Will call if sooner appt is needed.

## 2021-09-28 ENCOUNTER — TELEPHONE (OUTPATIENT)
Dept: FAMILY MEDICINE CLINIC | Age: 76
End: 2021-09-28

## 2021-10-27 ENCOUNTER — HOSPITAL ENCOUNTER (OUTPATIENT)
Age: 76
Setting detail: SPECIMEN
Discharge: HOME | End: 2021-10-27
Payer: MEDICARE

## 2021-10-27 ENCOUNTER — OFFICE VISIT (OUTPATIENT)
Dept: INTERNAL MEDICINE CLINIC | Age: 76
End: 2021-10-27
Payer: MEDICARE

## 2021-10-27 VITALS
HEART RATE: 76 BPM | WEIGHT: 203.6 LBS | DIASTOLIC BLOOD PRESSURE: 60 MMHG | SYSTOLIC BLOOD PRESSURE: 126 MMHG | HEIGHT: 67 IN | RESPIRATION RATE: 16 BRPM | BODY MASS INDEX: 31.96 KG/M2 | TEMPERATURE: 96.8 F

## 2021-10-27 DIAGNOSIS — I10 HYPERTENSION, UNSPECIFIED TYPE: ICD-10-CM

## 2021-10-27 DIAGNOSIS — Z99.81 OXYGEN DEPENDENT: ICD-10-CM

## 2021-10-27 DIAGNOSIS — I87.2 EDEMA OF BOTH LOWER EXTREMITIES DUE TO PERIPHERAL VENOUS INSUFFICIENCY: ICD-10-CM

## 2021-10-27 DIAGNOSIS — R42 VERTIGO: ICD-10-CM

## 2021-10-27 DIAGNOSIS — Z28.21 PNEUMOCOCCAL VACCINATION DECLINED: ICD-10-CM

## 2021-10-27 DIAGNOSIS — J44.9 CHRONIC OBSTRUCTIVE PULMONARY DISEASE, UNSPECIFIED COPD TYPE (HCC): ICD-10-CM

## 2021-10-27 DIAGNOSIS — D50.0 IRON DEFICIENCY ANEMIA DUE TO CHRONIC BLOOD LOSS: ICD-10-CM

## 2021-10-27 DIAGNOSIS — E78.49 OTHER HYPERLIPIDEMIA: ICD-10-CM

## 2021-10-27 DIAGNOSIS — Z09 HOSPITAL DISCHARGE FOLLOW-UP: Primary | ICD-10-CM

## 2021-10-27 DIAGNOSIS — F17.200 SMOKER: ICD-10-CM

## 2021-10-27 DIAGNOSIS — I61.9 BRAIN BLEED (HCC): ICD-10-CM

## 2021-10-27 DIAGNOSIS — Z28.21 COVID-19 VACCINATION DECLINED: ICD-10-CM

## 2021-10-27 DIAGNOSIS — E10.9 CONTROLLED DIABETES MELLITUS TYPE 1 WITHOUT COMPLICATIONS (HCC): ICD-10-CM

## 2021-10-27 DIAGNOSIS — Z28.21 INFLUENZA VACCINATION DECLINED: ICD-10-CM

## 2021-10-27 DIAGNOSIS — Z09 HOSPITAL DISCHARGE FOLLOW-UP: ICD-10-CM

## 2021-10-27 DIAGNOSIS — I50.22 CHRONIC SYSTOLIC CONGESTIVE HEART FAILURE (HCC): ICD-10-CM

## 2021-10-27 PROBLEM — T45.515A WARFARIN-INDUCED COAGULOPATHY (HCC): Status: RESOLVED | Noted: 2019-12-17 | Resolved: 2021-10-27

## 2021-10-27 PROBLEM — I83.201: Status: RESOLVED | Noted: 2021-07-19 | Resolved: 2021-10-27

## 2021-10-27 PROBLEM — D68.32 WARFARIN-INDUCED COAGULOPATHY (HCC): Status: RESOLVED | Noted: 2019-12-17 | Resolved: 2021-10-27

## 2021-10-27 PROBLEM — L97.104: Status: RESOLVED | Noted: 2021-07-19 | Resolved: 2021-10-27

## 2021-10-27 PROBLEM — I48.20 CHRONIC ATRIAL FIBRILLATION (HCC): Status: RESOLVED | Noted: 2019-12-17 | Resolved: 2021-10-27

## 2021-10-27 LAB
ALBUMIN SERPL-MCNC: 3.7 G/DL (ref 3.5–5.2)
ALBUMIN/GLOBULIN RATIO: 1 (ref 1–2.5)
ALP BLD-CCNC: 134 U/L (ref 35–104)
ALT SERPL-CCNC: 6 U/L (ref 5–33)
ANION GAP SERPL CALCULATED.3IONS-SCNC: 17 MMOL/L (ref 9–17)
AST SERPL-CCNC: 10 U/L
BILIRUB SERPL-MCNC: 0.24 MG/DL (ref 0.3–1.2)
BUN BLDV-MCNC: 24 MG/DL (ref 8–23)
BUN/CREAT BLD: ABNORMAL (ref 9–20)
CALCIUM SERPL-MCNC: 9.4 MG/DL (ref 8.6–10.4)
CHLORIDE BLD-SCNC: 108 MMOL/L (ref 98–107)
CHOLESTEROL, FASTING: 113 MG/DL
CHOLESTEROL/HDL RATIO: 2.6
CO2: 14 MMOL/L (ref 20–31)
CREAT SERPL-MCNC: 1.49 MG/DL (ref 0.5–0.9)
ESTIMATED AVERAGE GLUCOSE: 214 MG/DL
GFR AFRICAN AMERICAN: 41 ML/MIN
GFR NON-AFRICAN AMERICAN: 34 ML/MIN
GFR SERPL CREATININE-BSD FRML MDRD: ABNORMAL ML/MIN/{1.73_M2}
GFR SERPL CREATININE-BSD FRML MDRD: ABNORMAL ML/MIN/{1.73_M2}
GLUCOSE FASTING: 138 MG/DL (ref 70–99)
HBA1C MFR BLD: 9.1 % (ref 4–6)
HCT VFR BLD CALC: 40.5 % (ref 36.3–47.1)
HDLC SERPL-MCNC: 44 MG/DL
HEMOGLOBIN: 12.1 G/DL (ref 11.9–15.1)
LDL CHOLESTEROL: 30 MG/DL (ref 0–130)
MCH RBC QN AUTO: 24.1 PG (ref 25.2–33.5)
MCHC RBC AUTO-ENTMCNC: 29.9 G/DL (ref 28.4–34.8)
MCV RBC AUTO: 80.7 FL (ref 82.6–102.9)
NRBC AUTOMATED: 0 PER 100 WBC
PDW BLD-RTO: 18.4 % (ref 11.8–14.4)
PLATELET # BLD: 369 K/UL (ref 138–453)
PMV BLD AUTO: 10.8 FL (ref 8.1–13.5)
POTASSIUM SERPL-SCNC: 4.7 MMOL/L (ref 3.7–5.3)
RBC # BLD: 5.02 M/UL (ref 3.95–5.11)
SODIUM BLD-SCNC: 139 MMOL/L (ref 135–144)
TOTAL PROTEIN: 7.4 G/DL (ref 6.4–8.3)
TRIGLYCERIDE, FASTING: 196 MG/DL
TSH SERPL DL<=0.05 MIU/L-ACNC: 1.67 MIU/L (ref 0.3–5)
VLDLC SERPL CALC-MCNC: ABNORMAL MG/DL (ref 1–30)
WBC # BLD: 18.6 K/UL (ref 3.5–11.3)

## 2021-10-27 PROCEDURE — 3288F FALL RISK ASSESSMENT DOCD: CPT | Performed by: FAMILY MEDICINE

## 2021-10-27 PROCEDURE — 99214 OFFICE O/P EST MOD 30 MIN: CPT | Performed by: FAMILY MEDICINE

## 2021-10-27 PROCEDURE — 1111F DSCHRG MED/CURRENT MED MERGE: CPT | Performed by: FAMILY MEDICINE

## 2021-10-27 ASSESSMENT — ENCOUNTER SYMPTOMS
EYES NEGATIVE: 1
ALLERGIC/IMMUNOLOGIC NEGATIVE: 1
RESPIRATORY NEGATIVE: 1
GASTROINTESTINAL NEGATIVE: 1
BACK PAIN: 1

## 2021-10-27 ASSESSMENT — COPD QUESTIONNAIRES: COPD: 1

## 2021-10-27 NOTE — PROGRESS NOTES
Subjective:      Patient ID: Sherry Patel is a 76 y.o. female. COPD  This is a chronic problem. The current episode started more than 1 month ago. The problem occurs intermittently. The problem has been waxing and waning. Her symptoms are aggravated by emotional stress, change in weather, exposure to fumes, exposure to smoke, pollen and strenuous activity. Her symptoms are alleviated by beta-agonist and steroid inhaler. She reports moderate improvement on treatment. Risk factors for lung disease include smoking/tobacco exposure. Her past medical history is significant for COPD. Review of Systems   Constitutional: Negative. HENT: Negative. Eyes: Negative. Respiratory: Negative. Cardiovascular: Negative. Gastrointestinal: Negative. Endocrine: Negative. Musculoskeletal: Positive for arthralgias and back pain. Skin: Negative. Allergic/Immunologic: Negative. Neurological: Negative. Hematological: Negative. Psychiatric/Behavioral: The patient is nervous/anxious. Past family and social history unremarkable. Diagnosis Orders   1. Hospital discharge follow-up  LA DISCHARGE MEDS RECONCILED W/ CURRENT OUTPATIENT MED LIST    TSH   2. Hypertension, unspecified type     3. Vertigo     4. Iron deficiency anemia due to chronic blood loss  CBC   5. Other hyperlipidemia     6. Chronic obstructive pulmonary disease, unspecified COPD type ([address]     7. Influenza vaccination declined     8. Pneumococcal vaccination declined     9. Smoker     10. Edema of both lower extremities due to peripheral venous insufficiency     11. Oxygen dependent     12. Chronic systolic congestive heart failure ([address]     13. Controlled diabetes mellitus type 1 without complications (HCC)  Hemoglobin A1C   14. COVID-19 vaccination declined     15. Brain bleed ([address]           Objective:   Physical Exam  Vitals and nursing note reviewed. Constitutional:       Appearance: She is well-developed.    HENT: Head: Normocephalic and atraumatic. Right Ear: External ear normal.      Left Ear: External ear normal.   Eyes:      Conjunctiva/sclera: Conjunctivae normal.      Pupils: Pupils are equal, round, and reactive to light. Cardiovascular:      Rate and Rhythm: Normal rate and regular rhythm. Heart sounds: Normal heart sounds. Comments: Suspected atrial fibrillation  Systolic congestive heart failure  First-degree AV block  Diabetes mellitus  Hypertension  Hyperlipidemia  Pulmonary:      Effort: Pulmonary effort is normal.      Breath sounds: Normal breath sounds. Comments: COPD-smoker  Abdominal:      General: Bowel sounds are normal.      Palpations: Abdomen is soft. Genitourinary:     Vagina: Normal.   Musculoskeletal:         General: Normal range of motion. Cervical back: Normal range of motion and neck supple. Comments: Degenerative polyarthralgia   Skin:     General: Skin is warm and dry. Neurological:      Mental Status: She is alert and oriented to person, place, and time. Deep Tendon Reflexes: Reflexes are normal and symmetric. Psychiatric:      Comments: Anxiety         Assessment:       Diagnosis Orders   1. Hospital discharge follow-up  MT DISCHARGE MEDS RECONCILED W/ CURRENT OUTPATIENT MED LIST    TSH   2. Hypertension, unspecified type     3. Vertigo     4. Iron deficiency anemia due to chronic blood loss  CBC   5. Other hyperlipidemia     6. Chronic obstructive pulmonary disease, unspecified COPD type ([address]     7. Influenza vaccination declined     8. Pneumococcal vaccination declined     9. Smoker     10. Edema of both lower extremities due to peripheral venous insufficiency     11. Oxygen dependent     12. Chronic systolic congestive heart failure ([address]     13. Controlled diabetes mellitus type 1 without complications (HCC)  Hemoglobin A1C   14. COVID-19 vaccination declined     15. Brain bleed ([address]             Plan: This is a hospital follow-up. 70-year-old -American female is brought in by her granddaughter for post discharge follow-up. She was admitted to [facility] with intractable headache. She was seen to have intracranial bleed. She was closely monitored by neurosurgery with resolution of her symptoms. No residual neuro deficit. Patient states that she did not need to have craniotomy. Suspected history of A. fib. However patient states that she was seen by cardiology and was seen to have normal sinus rhythm. However she states that loop monitoring was suspicious for possibility of AV block. Since discharge, she has been taken off Coumadin. She has a follow-up appointment with cardiology tomorrow. Currently she is in a normal sinus rhythm, rate controlled  History of systolic congestive heart failure. She is diuresing well in negative fluid balance. Continue Lasix, metoprolol, Aldactone, amlodipine, Cozaar per cardiology. Consume less than 2 g of salt a day  Hyperlipidemia on statin that she is tolerating well  COPD. She continued to smoke despite advice and does not express any desire to quit anytime soon. Continue beta agonist and inhaled corticosteroid  Insulin-dependent diabetes mellitus. A1c as of 7/2021 was 9.9. Which is a progressive improvement. She is noncompliant with ADA 1800 diet. She denies hypoglycemia.   She is advised to keep Accu-Chek log for office review  She is advised to update annual dilated eye exam  She is counseled on diabetic foot care  Anxiety however denies being depressed  She has got good family support  Med list and available labs reviewed, discussed with patient, questions answered  She declined influenza, Covid vaccine, pneumonia vaccine  Call for any concern  This note is created with a voice recognition program and while intend to generate a document that accurately reflects the content of the visit, no guarantee can be provided that every mistake has been identified and corrected by editing.           Geronimo Madrid MD

## 2021-11-08 ENCOUNTER — TELEPHONE (OUTPATIENT)
Dept: PHARMACY | Facility: CLINIC | Age: 76
End: 2021-11-08

## 2021-11-08 NOTE — TELEPHONE ENCOUNTER
[facility] [place] HEALTH CLINICAL PHARMACY REVIEW: ADHERENCE REVIEW  Identified care gap per [place]; fills at [facility]: Statin adherence    Last Visit: 7/19/2021    Patient identified as LIS = 1, therefore patient may be able to receive a 90-day supply for the same cost as a 30-day supply    Patient also appears to be prescribed: insulin, metformin, losartan    ASSESSMENT  ACE/ARB ADHERENCE    Per Reconciled Dispense Report:  Losartan 100mg last filled on 9/17/2021 for 30 day supply. Per [address]:   Losartan last picked up on 10/27/2021 for 90 day supply. 4 refills remaining. Billed through [place]     BP Readings from Last 3 Encounters:   10/27/21 126/60   07/19/21 (!) 146/76   03/17/21 (!) 150/82     Estimated Creatinine Clearance: 38 mL/min (A) (based on SCr of 1.49 mg/dL (H)). DIABETES ADHERENCE    Per Reconciled Dispense Report:  Metformin 500mg ER last filled on 6/26/2021 for 90 day supply. Per [address]:   Metformin last picked up on 7/8/2021 for 90 day supply. 1 refill remaining. Billed through [employer] Value Date    LABA1C 9.1 (H) 10/27/2021    LABA1C 9.9 (H) 07/19/2021    LABA1C 10.3 (H) 03/17/2021     NOTE A1c >9%    STATIN ADHERENCE    Per Insurance Records through 10/24/2021 (YTD Adilson De La Fuenteandra = Filled only once; Potential Fail Date: 11/10/2021): Atorvastatin 40mg last filled on 7/8/2021for 90 day supply. Next refill due: 10/6/2021    Per Reconciled Dispense Report:  Atorvastatin 40mg last filled on 7/8/2021 for 90 day supply. Per [address]:   Atorvastatin last picked up on 7/22/2021 for 90 day supply. 1 refill remaining.  Billed through [employer] Value Date    CHOL 188 07/19/2021    TRIG 229 (H) 07/19/2021    HDL 44 10/27/2021    LDLCHOLESTEROL 30 10/27/2021    LDLCALC 83 01/08/2014     ALT   Date Value Ref Range Status   10/27/2021 6 5 - 33 U/L Final     AST   Date Value Ref Range Status   10/27/2021 10 <32 U/L Final     The ASCVD Risk score (Jeralene Brunner., et al., 2013) failed to calculate for the following reasons: The valid total cholesterol range is 130 to 320 mg/dL     PLAN  The following are interventions that have been identified:   - Patient overdue refilling atorvastatin and metformin and active on home medication list.     Attempting to reach patient to review.  Left message asking for return call.       Future Appointments   Date Time Provider Toni Warren   1/28/2022  8:30 AM Keren Azul MD [facility] Pharm[facility]  Population Health Fellow  [address] // Department, toll free [phone], Option 1

## 2021-11-08 NOTE — LETTER
[place]  [address], [address]        Norma Rodgers   [facility]  [address] [address] [facility]           11/09/21     Dear Norma Rodgers,    We tried to reach you recently regarding your medications, but were unable to reach you on the telephone. We have on file that you are currently taking atorvastatin 40mg by mouth once daily and metformin 500mg ER by mouth twice a day. If you are no longer taking or taking differently, please call us at the number below so that we can discuss this and update your medication profile. It appears that this medication has not been filled at proper times. We are worried you might be missing doses or not taking it as directed. It is important that you take your medications regularly and try not to miss a single dose.     Some ways to help you remember to take and refill your medications are to:  · Use a pill box, set an alarm, and/or keep your medication near something that you do every day  · Ask your pharmacy if they participate in [facility]", a program where you can  all of your medications on the same day  · Ask your pharmacy if you can be set up with automatic refill, where they will automatically refill your prescription when it is due and let you know it's ready to     Sincerely,   [address] // Department, toll free [phone], Option 1

## 2021-11-09 NOTE — TELEPHONE ENCOUNTER
POPULATION HEALTH CLINICAL PHARMACY REVIEW: ADHERENCE REVIEW    Second attempt to reach patient. Left message asking for return call. Will send letter.     [address] // Department, toll free [phone], Option 1    =======================================================    For Pharmacy Admin Tracking Only     Gap Closed?: No    Time Spent (min): 15

## 2021-11-29 DIAGNOSIS — J45.20 MILD INTERMITTENT ASTHMA WITHOUT COMPLICATION: ICD-10-CM

## 2021-11-29 RX ORDER — BLOOD SUGAR DIAGNOSTIC
STRIP MISCELLANEOUS
Qty: 50 STRIP | Refills: 3 | Status: SHIPPED | OUTPATIENT
Start: 2021-11-29

## 2021-11-29 NOTE — TELEPHONE ENCOUNTER
Dereje Jeffery is calling to request a refill on the following medication(s):    Last Visit Date (If Applicable):  26/05/3516    Next Visit Date:    1/28/2022    Medication Request:  Requested Prescriptions     Pending Prescriptions Disp Refills    PROAIR  (90 Base) MCG/ACT inhaler [Pharmacy Med Name: PROAIR HFA 90 MCG INHALER] 8.5 g 1     Sig: INHALE TWO PUFFS BY MOUTH EVERY 6 HOURS AS NEEDED FOR WHEEZING

## 2021-11-29 NOTE — TELEPHONE ENCOUNTER
Magali Mckeon is calling to request a refill on the following medication(s):    Medication Request:    Last filled 3/22/2021 #200 with 0 RF    Requested Prescriptions     Pending Prescriptions Disp Refills    blood glucose test strips (ONETOUCH VERIO) strip [Pharmacy Med Name: [place] TEST STRIP] 50 strip      Sig: USE ONE STRIP DAILY AS NEEDED       Last Visit Date (If Applicable):  78/99/1256    Next Visit Date:    1/28/2022

## 2021-12-21 RX ORDER — INSULIN GLARGINE 100 [IU]/ML
INJECTION, SOLUTION SUBCUTANEOUS
Qty: 17 PEN | Refills: 0 | Status: SHIPPED
Start: 2021-12-21 | End: 2022-05-09 | Stop reason: ALTCHOICE

## 2021-12-21 NOTE — TELEPHONE ENCOUNTER
Neftali Rosado is calling to request a refill on the following medication(s):    Medication Request:  Requested Prescriptions     Pending Prescriptions Disp Refills    insulin glargine (LANTUS SOLOSTAR) 100 UNIT/ML injection pen 17 pen 0     Sig: INJECT 56 UNITS UNDER THE SKIN NIGHTLY       Last Visit Date (If Applicable):  47/09/2053    Next Visit Date:    1/28/2022

## 2022-01-28 ENCOUNTER — OFFICE VISIT (OUTPATIENT)
Dept: INTERNAL MEDICINE CLINIC | Age: 77
End: 2022-01-28
Payer: MEDICARE

## 2022-01-28 ENCOUNTER — HOSPITAL ENCOUNTER (OUTPATIENT)
Age: 77
Setting detail: SPECIMEN
Discharge: HOME | End: 2022-01-28

## 2022-01-28 VITALS
OXYGEN SATURATION: 96 % | WEIGHT: 207 LBS | BODY MASS INDEX: 32.49 KG/M2 | RESPIRATION RATE: 16 BRPM | DIASTOLIC BLOOD PRESSURE: 70 MMHG | TEMPERATURE: 97.9 F | HEART RATE: 84 BPM | SYSTOLIC BLOOD PRESSURE: 128 MMHG | HEIGHT: 67 IN

## 2022-01-28 DIAGNOSIS — I87.2 EDEMA OF BOTH LOWER EXTREMITIES DUE TO PERIPHERAL VENOUS INSUFFICIENCY: ICD-10-CM

## 2022-01-28 DIAGNOSIS — Z28.21 COVID-19 VACCINATION DECLINED: ICD-10-CM

## 2022-01-28 DIAGNOSIS — Z99.81 OXYGEN DEPENDENT: ICD-10-CM

## 2022-01-28 DIAGNOSIS — I10 BENIGN ESSENTIAL HTN: ICD-10-CM

## 2022-01-28 DIAGNOSIS — J44.9 CHRONIC OBSTRUCTIVE PULMONARY DISEASE, UNSPECIFIED COPD TYPE (HCC): ICD-10-CM

## 2022-01-28 DIAGNOSIS — E78.49 OTHER HYPERLIPIDEMIA: ICD-10-CM

## 2022-01-28 DIAGNOSIS — Z28.21 INFLUENZA VACCINATION DECLINED: ICD-10-CM

## 2022-01-28 DIAGNOSIS — R42 VERTIGO: ICD-10-CM

## 2022-01-28 DIAGNOSIS — Z28.21 PNEUMOCOCCAL VACCINATION DECLINED: ICD-10-CM

## 2022-01-28 DIAGNOSIS — F17.200 SMOKER: ICD-10-CM

## 2022-01-28 DIAGNOSIS — I50.22 CHRONIC SYSTOLIC CONGESTIVE HEART FAILURE (HCC): ICD-10-CM

## 2022-01-28 DIAGNOSIS — E10.65 TYPE 1 DIABETES MELLITUS WITH HYPERGLYCEMIA (HCC): ICD-10-CM

## 2022-01-28 DIAGNOSIS — E10.65 TYPE 1 DIABETES MELLITUS WITH HYPERGLYCEMIA (HCC): Primary | ICD-10-CM

## 2022-01-28 DIAGNOSIS — D50.0 IRON DEFICIENCY ANEMIA DUE TO CHRONIC BLOOD LOSS: ICD-10-CM

## 2022-01-28 PROBLEM — I61.9 BRAIN BLEED (HCC): Status: RESOLVED | Noted: 2021-10-27 | Resolved: 2022-01-28

## 2022-01-28 PROBLEM — E10.9 CONTROLLED DIABETES MELLITUS TYPE 1 WITHOUT COMPLICATIONS (HCC): Status: RESOLVED | Noted: 2021-03-17 | Resolved: 2022-01-28

## 2022-01-28 LAB
-: ABNORMAL
ALBUMIN SERPL-MCNC: 3.5 G/DL (ref 3.5–5.2)
ALBUMIN/GLOBULIN RATIO: 1.1 (ref 1–2.5)
ALP BLD-CCNC: 140 U/L (ref 35–104)
ALT SERPL-CCNC: 7 U/L (ref 5–33)
AMORPHOUS: ABNORMAL
ANION GAP SERPL CALCULATED.3IONS-SCNC: 18 MMOL/L (ref 9–17)
AST SERPL-CCNC: 8 U/L
BACTERIA: ABNORMAL
BILIRUB SERPL-MCNC: 0.29 MG/DL (ref 0.3–1.2)
BILIRUBIN URINE: NEGATIVE
BUN BLDV-MCNC: 11 MG/DL (ref 8–23)
BUN/CREAT BLD: ABNORMAL (ref 9–20)
CALCIUM SERPL-MCNC: 8.6 MG/DL (ref 8.6–10.4)
CASTS UA: ABNORMAL /LPF (ref 0–2)
CHLORIDE BLD-SCNC: 104 MMOL/L (ref 98–107)
CHOLESTEROL, FASTING: 111 MG/DL
CHOLESTEROL/HDL RATIO: 2.4
CO2: 21 MMOL/L (ref 20–31)
COLOR: ABNORMAL
COMMENT UA: ABNORMAL
CREAT SERPL-MCNC: 0.98 MG/DL (ref 0.5–0.9)
CREATININE URINE: 210.3 MG/DL (ref 28–217)
CRYSTALS, UA: ABNORMAL /HPF
EPITHELIAL CELLS UA: ABNORMAL /HPF (ref 0–5)
ESTIMATED AVERAGE GLUCOSE: 223 MG/DL
GFR AFRICAN AMERICAN: >60 ML/MIN
GFR NON-AFRICAN AMERICAN: 55 ML/MIN
GFR SERPL CREATININE-BSD FRML MDRD: ABNORMAL ML/MIN/{1.73_M2}
GFR SERPL CREATININE-BSD FRML MDRD: ABNORMAL ML/MIN/{1.73_M2}
GLUCOSE FASTING: 133 MG/DL (ref 70–99)
GLUCOSE URINE: NEGATIVE
HBA1C MFR BLD: 9.4 % (ref 4–6)
HCT VFR BLD CALC: 39 % (ref 36.3–47.1)
HDLC SERPL-MCNC: 47 MG/DL
HEMOGLOBIN: 11.5 G/DL (ref 11.9–15.1)
KETONES, URINE: NEGATIVE
LDL CHOLESTEROL: 31 MG/DL (ref 0–130)
LEUKOCYTE ESTERASE, URINE: ABNORMAL
MCH RBC QN AUTO: 23.9 PG (ref 25.2–33.5)
MCHC RBC AUTO-ENTMCNC: 29.5 G/DL (ref 28.4–34.8)
MCV RBC AUTO: 80.9 FL (ref 82.6–102.9)
MICROALBUMIN/CREAT 24H UR: 1381 MG/L
MICROALBUMIN/CREAT UR-RTO: 657 MCG/MG CREAT
MUCUS: ABNORMAL
NITRITE, URINE: POSITIVE
NRBC AUTOMATED: 0 PER 100 WBC
OTHER OBSERVATIONS UA: ABNORMAL
PDW BLD-RTO: 17.6 % (ref 11.8–14.4)
PH UA: 5.5 (ref 5–8)
PLATELET # BLD: 407 K/UL (ref 138–453)
PMV BLD AUTO: 10.7 FL (ref 8.1–13.5)
POTASSIUM SERPL-SCNC: 3.3 MMOL/L (ref 3.7–5.3)
PROTEIN UA: ABNORMAL
RBC # BLD: 4.82 M/UL (ref 3.95–5.11)
RBC UA: ABNORMAL /HPF (ref 0–2)
RENAL EPITHELIAL, UA: ABNORMAL /HPF
SODIUM BLD-SCNC: 143 MMOL/L (ref 135–144)
SPECIFIC GRAVITY UA: 1.02 (ref 1–1.03)
TOTAL PROTEIN: 6.8 G/DL (ref 6.4–8.3)
TRICHOMONAS: ABNORMAL
TRIGLYCERIDE, FASTING: 165 MG/DL
TSH SERPL DL<=0.05 MIU/L-ACNC: 1.85 MIU/L (ref 0.3–5)
TURBIDITY: CLEAR
URINE HGB: ABNORMAL
UROBILINOGEN, URINE: NORMAL
VLDLC SERPL CALC-MCNC: ABNORMAL MG/DL (ref 1–30)
WBC # BLD: 15.9 K/UL (ref 3.5–11.3)
WBC UA: ABNORMAL /HPF (ref 0–5)
YEAST: ABNORMAL

## 2022-01-28 PROCEDURE — G8417 CALC BMI ABV UP PARAM F/U: HCPCS | Performed by: FAMILY MEDICINE

## 2022-01-28 PROCEDURE — 4040F PNEUMOC VAC/ADMIN/RCVD: CPT | Performed by: FAMILY MEDICINE

## 2022-01-28 PROCEDURE — 3023F SPIROM DOC REV: CPT | Performed by: FAMILY MEDICINE

## 2022-01-28 PROCEDURE — 4004F PT TOBACCO SCREEN RCVD TLK: CPT | Performed by: FAMILY MEDICINE

## 2022-01-28 PROCEDURE — 99214 OFFICE O/P EST MOD 30 MIN: CPT | Performed by: FAMILY MEDICINE

## 2022-01-28 PROCEDURE — G8427 DOCREV CUR MEDS BY ELIG CLIN: HCPCS | Performed by: FAMILY MEDICINE

## 2022-01-28 PROCEDURE — G8399 PT W/DXA RESULTS DOCUMENT: HCPCS | Performed by: FAMILY MEDICINE

## 2022-01-28 PROCEDURE — 1123F ACP DISCUSS/DSCN MKR DOCD: CPT | Performed by: FAMILY MEDICINE

## 2022-01-28 PROCEDURE — G8484 FLU IMMUNIZE NO ADMIN: HCPCS | Performed by: FAMILY MEDICINE

## 2022-01-28 PROCEDURE — 1090F PRES/ABSN URINE INCON ASSESS: CPT | Performed by: FAMILY MEDICINE

## 2022-01-28 RX ORDER — LOSARTAN POTASSIUM 100 MG/1
TABLET ORAL
COMMUNITY
Start: 2021-12-08 | End: 2022-03-10 | Stop reason: SDUPTHER

## 2022-01-28 ASSESSMENT — ENCOUNTER SYMPTOMS
SHORTNESS OF BREATH: 1
BACK PAIN: 1
EYES NEGATIVE: 1
ALLERGIC/IMMUNOLOGIC NEGATIVE: 1
GASTROINTESTINAL NEGATIVE: 1
COUGH: 1

## 2022-01-28 ASSESSMENT — COPD QUESTIONNAIRES: COPD: 1

## 2022-01-28 NOTE — PROGRESS NOTES
Subjective:      Patient ID: Iván Parish is a 68 y.o. female. COPD  She complains of cough and shortness of breath. This is a chronic problem. The current episode started more than 1 year ago. The problem occurs intermittently. The problem has been waxing and waning. The cough is non-productive. Her symptoms are aggravated by emotional stress, change in weather, exposure to fumes, exposure to smoke, pollen and strenuous activity. Her symptoms are alleviated by beta-agonist and steroid inhaler (Home oxygen). She reports moderate improvement on treatment. Risk factors for lung disease include smoking/tobacco exposure. Her past medical history is significant for COPD. Review of Systems   Constitutional: Negative. HENT: Negative. Eyes: Negative. Respiratory: Positive for cough and shortness of breath. Cardiovascular: Negative. Gastrointestinal: Negative. Endocrine: Negative. Musculoskeletal: Positive for arthralgias and back pain. Skin: Negative. Allergic/Immunologic: Negative. Neurological: Negative. Hematological: Negative. Psychiatric/Behavioral: Positive for dysphoric mood. Past family and social history unremarkable. Diagnosis Orders   1. Type 1 diabetes mellitus with hyperglycemia  Flex Will MD, Endocrinology, [place]    Urinalysis Reflex to Culture   2. Benign essential HTN     3. Chronic obstructive pulmonary disease, unspecified COPD type ([facility] [address]     4. Chronic systolic congestive heart failure (HCC)  CBC    Comprehensive Metabolic Panel    Hemoglobin A1C    Lipid Panel    Microalbumin, Ur    TSH without Reflex   5. Vertigo     6. Iron deficiency anemia due to chronic blood loss     7. Other hyperlipidemia     8. Influenza vaccination declined     9. Pneumococcal vaccination declined     10. Smoker     11. Edema of both lower extremities due to peripheral venous insufficiency     12. Oxygen dependent     13.  COVID-19 vaccination declined Objective:   Physical Exam  Vitals and nursing note reviewed. Constitutional:       Appearance: She is well-developed. HENT:      Head: Normocephalic and atraumatic. Right Ear: External ear normal.      Left Ear: External ear normal.   Eyes:      Conjunctiva/sclera: Conjunctivae normal.      Pupils: Pupils are equal, round, and reactive to light. Cardiovascular:      Rate and Rhythm: Normal rate and regular rhythm. Heart sounds: Normal heart sounds. Comments: Coronary artery disease  Systolic congestive heart failure  Uncontrolled insulin-dependent diabetes mellitus  Hypertension  Hyperlipidemia  Pulmonary:      Effort: Pulmonary effort is normal.      Breath sounds: Normal breath sounds. Comments: Home O2 dependent COPD  Smoker  Abdominal:      General: Bowel sounds are normal.      Palpations: Abdomen is soft. Comments: Iron deficiency anemia   Genitourinary:     Vagina: Normal.      Comments: Diabetic nephropathy  Musculoskeletal:         General: Normal range of motion. Cervical back: Normal range of motion and neck supple. Comments: Degenerative polyarthralgia   Skin:     General: Skin is warm and dry. Neurological:      Mental Status: She is alert and oriented to person, place, and time. Deep Tendon Reflexes: Reflexes are normal and symmetric. Comments: History of hemorrhagic stroke   Psychiatric:      Comments: Anxiety  She declined influenza, pneumococcal vaccine, Covid vaccine, home health care         Assessment:       Diagnosis Orders   1. Type 1 diabetes mellitus with hyperglycemia  Mae Merritt MD, Endocrinology, [place]    Urinalysis Reflex to Culture   2. Benign essential HTN     3. Chronic obstructive pulmonary disease, unspecified COPD type ([facility] [address]     4. Chronic systolic congestive heart failure (HCC)  CBC    Comprehensive Metabolic Panel    Hemoglobin A1C    Lipid Panel    Microalbumin, Ur    TSH without Reflex   5.  Vertigo 6. Iron deficiency anemia due to chronic blood loss     7. Other hyperlipidemia     8. Influenza vaccination declined     9. Pneumococcal vaccination declined     10. Smoker     11. Edema of both lower extremities due to peripheral venous insufficiency     12. Oxygen dependent     13. COVID-19 vaccination declined             Plan:      70-year-old -American female daughter who is the power of  for routine follow-up. She is afebrile hemodynamically stable  History of coronary artery disease, systolic congestive heart failure. She is diuresing well in negative fluid balance. She was recently seen by cardiologist.  Continue beta-blocker, calcium channel blocker, ARB and diuretic  Insulin-dependent diabetes mellitus with A1c of 9.1. She is noncompliant with diet, insulin as directed. She is advised to keep Accu-Chek log for office review, adhere to ADA 1800 diet. She will be assigned to an endocrinologist.  Hypertension we aim to keep her blood pressure equal less than 130/80. Consume less than 2 g of salt a day, optimize diabetes  Diabetic nephropathy with gradual worsening of creatinine. Creatinine is 1.49. Optimize diabetes, blood pressure, avoid nephrotoxic drugs, anti-inflammatory radiocontrast  Hyperlipidemia on statin that she is tolerating well  Lower extremity pedal edema. Increase mobility as tolerated, leg elevation  Home O2 dependent COPD. She continued to smoke despite advised. 1 pack a day and she does not express any desire to quit anytime soon. Once again she is counseled, quit date, alternative to consider. Continue beta agonist inhaled corticosteroid, Spiriva and supplemental O2  History of intracranial bleed. No significant focal deficit. Degenerative polyarthralgia. May take over-the-counter Tylenol as needed  Safety counseling including but not limited to carbon monoxide/fire alarm, ambulating, avoiding loose clutter and staying in familiar environment.   Social issues. Patient is reluctant to any vaccinations, smoke cessation. She is noncompliant with diet and medications as directed. She declined home health care. Her daughter states that she is the power of  along with her older sister who lives in the same apartment building. Patient lives alone. I told power of  that I feel very concerned regarding patient age and multiple comorbidities and noncompliance and her status of independent living. She is advised to consider placement or at least home health care. She would benefit from investing in home care alert device  She denies excessive alcohol or illicit drug use. She understand that opiates are good oxygen and smoking is a fire hazard  Further recommendations to follow labs  This note is created with a voice recognition program and while intend to generate a document that accurately reflects the content of the visit, no guarantee can be provided that every mistake has been identified and corrected by editing.             Wendy Simmons MD

## 2022-01-31 LAB
CULTURE: ABNORMAL
Lab: ABNORMAL
SPECIMEN DESCRIPTION: ABNORMAL

## 2022-01-31 RX ORDER — CEPHALEXIN 500 MG/1
500 CAPSULE ORAL 2 TIMES DAILY
Qty: 14 CAPSULE | Refills: 0 | Status: SHIPPED | OUTPATIENT
Start: 2022-01-31 | End: 2022-02-07

## 2022-03-10 RX ORDER — SPIRONOLACTONE 25 MG/1
25 TABLET ORAL DAILY
Qty: 90 TABLET | Refills: 0 | Status: SHIPPED | OUTPATIENT
Start: 2022-03-10 | End: 2022-05-23

## 2022-03-10 RX ORDER — POTASSIUM CHLORIDE 20 MEQ/1
TABLET, EXTENDED RELEASE ORAL
Qty: 180 TABLET | Refills: 0 | Status: SHIPPED | OUTPATIENT
Start: 2022-03-10 | End: 2022-05-23

## 2022-03-10 RX ORDER — METFORMIN HYDROCHLORIDE 500 MG/1
TABLET, EXTENDED RELEASE ORAL
Qty: 180 TABLET | Refills: 0 | Status: SHIPPED | OUTPATIENT
Start: 2022-03-10 | End: 2022-05-23

## 2022-03-10 RX ORDER — LOSARTAN POTASSIUM 100 MG/1
100 TABLET ORAL DAILY
Qty: 90 TABLET | Refills: 0 | Status: SHIPPED | OUTPATIENT
Start: 2022-03-10 | End: 2022-05-23

## 2022-03-10 RX ORDER — ATORVASTATIN CALCIUM 40 MG/1
40 TABLET, FILM COATED ORAL DAILY
Qty: 90 TABLET | Refills: 0 | Status: SHIPPED | OUTPATIENT
Start: 2022-03-10 | End: 2022-05-23

## 2022-03-10 RX ORDER — METOPROLOL SUCCINATE 50 MG/1
TABLET, EXTENDED RELEASE ORAL
Qty: 180 TABLET | Refills: 0 | Status: SHIPPED | OUTPATIENT
Start: 2022-03-10 | End: 2022-05-23

## 2022-03-10 RX ORDER — FUROSEMIDE 40 MG/1
TABLET ORAL
Qty: 90 TABLET | Refills: 0 | Status: SHIPPED | OUTPATIENT
Start: 2022-03-10 | End: 2022-05-23

## 2022-05-09 ENCOUNTER — OFFICE VISIT (OUTPATIENT)
Dept: INTERNAL MEDICINE CLINIC | Age: 77
End: 2022-05-09
Payer: MEDICARE

## 2022-05-09 VITALS
TEMPERATURE: 97.5 F | HEART RATE: 94 BPM | DIASTOLIC BLOOD PRESSURE: 86 MMHG | BODY MASS INDEX: 31.39 KG/M2 | SYSTOLIC BLOOD PRESSURE: 136 MMHG | WEIGHT: 200 LBS | HEIGHT: 67 IN | RESPIRATION RATE: 16 BRPM | OXYGEN SATURATION: 95 %

## 2022-05-09 DIAGNOSIS — I50.22 CHRONIC SYSTOLIC CONGESTIVE HEART FAILURE (HCC): ICD-10-CM

## 2022-05-09 DIAGNOSIS — J44.9 CHRONIC OBSTRUCTIVE PULMONARY DISEASE, UNSPECIFIED COPD TYPE (HCC): ICD-10-CM

## 2022-05-09 DIAGNOSIS — I87.2 EDEMA OF BOTH LOWER EXTREMITIES DUE TO PERIPHERAL VENOUS INSUFFICIENCY: ICD-10-CM

## 2022-05-09 DIAGNOSIS — Z28.21 PNEUMOCOCCAL VACCINATION DECLINED: ICD-10-CM

## 2022-05-09 DIAGNOSIS — Z28.21 COVID-19 VACCINATION DECLINED: ICD-10-CM

## 2022-05-09 DIAGNOSIS — Z28.21 INFLUENZA VACCINATION DECLINED: ICD-10-CM

## 2022-05-09 DIAGNOSIS — R42 VERTIGO: ICD-10-CM

## 2022-05-09 DIAGNOSIS — D50.0 IRON DEFICIENCY ANEMIA DUE TO CHRONIC BLOOD LOSS: ICD-10-CM

## 2022-05-09 DIAGNOSIS — F17.200 SMOKER: ICD-10-CM

## 2022-05-09 DIAGNOSIS — E10.65 UNCONTROLLED TYPE 1 DIABETES MELLITUS WITH HYPERGLYCEMIA (HCC): Primary | ICD-10-CM

## 2022-05-09 DIAGNOSIS — Z99.81 OXYGEN DEPENDENT: ICD-10-CM

## 2022-05-09 DIAGNOSIS — I10 BENIGN ESSENTIAL HTN: ICD-10-CM

## 2022-05-09 DIAGNOSIS — E78.49 OTHER HYPERLIPIDEMIA: ICD-10-CM

## 2022-05-09 LAB — HBA1C MFR BLD: 9.7 %

## 2022-05-09 PROCEDURE — 4004F PT TOBACCO SCREEN RCVD TLK: CPT | Performed by: FAMILY MEDICINE

## 2022-05-09 PROCEDURE — G8427 DOCREV CUR MEDS BY ELIG CLIN: HCPCS | Performed by: FAMILY MEDICINE

## 2022-05-09 PROCEDURE — 1123F ACP DISCUSS/DSCN MKR DOCD: CPT | Performed by: FAMILY MEDICINE

## 2022-05-09 PROCEDURE — 3023F SPIROM DOC REV: CPT | Performed by: FAMILY MEDICINE

## 2022-05-09 PROCEDURE — 3046F HEMOGLOBIN A1C LEVEL >9.0%: CPT | Performed by: FAMILY MEDICINE

## 2022-05-09 PROCEDURE — 4040F PNEUMOC VAC/ADMIN/RCVD: CPT | Performed by: FAMILY MEDICINE

## 2022-05-09 PROCEDURE — G8399 PT W/DXA RESULTS DOCUMENT: HCPCS | Performed by: FAMILY MEDICINE

## 2022-05-09 PROCEDURE — 99214 OFFICE O/P EST MOD 30 MIN: CPT | Performed by: FAMILY MEDICINE

## 2022-05-09 PROCEDURE — 83036 HEMOGLOBIN GLYCOSYLATED A1C: CPT | Performed by: FAMILY MEDICINE

## 2022-05-09 PROCEDURE — G8417 CALC BMI ABV UP PARAM F/U: HCPCS | Performed by: FAMILY MEDICINE

## 2022-05-09 PROCEDURE — 1090F PRES/ABSN URINE INCON ASSESS: CPT | Performed by: FAMILY MEDICINE

## 2022-05-09 RX ORDER — INSULIN GLARGINE 100 [IU]/ML
30 INJECTION, SOLUTION SUBCUTANEOUS 2 TIMES DAILY
Qty: 5 PEN | Refills: 1 | Status: SHIPPED | OUTPATIENT
Start: 2022-05-09 | End: 2022-05-20

## 2022-05-09 ASSESSMENT — PATIENT HEALTH QUESTIONNAIRE - PHQ9
SUM OF ALL RESPONSES TO PHQ9 QUESTIONS 1 & 2: 0
SUM OF ALL RESPONSES TO PHQ QUESTIONS 1-9: 0
2. FEELING DOWN, DEPRESSED OR HOPELESS: 0
1. LITTLE INTEREST OR PLEASURE IN DOING THINGS: 0
SUM OF ALL RESPONSES TO PHQ QUESTIONS 1-9: 0

## 2022-05-09 ASSESSMENT — ENCOUNTER SYMPTOMS
RESPIRATORY NEGATIVE: 1
GASTROINTESTINAL NEGATIVE: 1
BACK PAIN: 1
ALLERGIC/IMMUNOLOGIC NEGATIVE: 1
EYES NEGATIVE: 1

## 2022-05-09 NOTE — PROGRESS NOTES
Subjective:      Patient ID: Ramiro Talbot is a 68 y.o. female. Diabetes  She presents for her follow-up diabetic visit. She has type 1 diabetes mellitus. Her disease course has been worsening. Hypoglycemia symptoms include nervousness/anxiousness. There are no diabetic associated symptoms. There are no hypoglycemic complications. Symptoms are stable. Diabetic complications include heart disease and nephropathy. Risk factors for coronary artery disease include diabetes mellitus, dyslipidemia, hypertension, post-menopausal and tobacco exposure. Current diabetic treatment includes insulin injections and oral agent (monotherapy). She is compliant with treatment all of the time. Her weight is stable. She is following a generally healthy diet. Meal planning includes ADA exchanges, avoidance of concentrated sweets, calorie counting and carbohydrate counting. She has had a previous visit with a dietitian. She participates in exercise three times a week. Her home blood glucose trend is increasing steadily. An ACE inhibitor/angiotensin II receptor blocker is being taken. Review of Systems   Constitutional: Negative. HENT: Negative. Eyes: Negative. Respiratory: Negative. Cardiovascular: Negative. Gastrointestinal: Negative. Endocrine: Negative. Musculoskeletal: Positive for arthralgias and back pain. Skin: Negative. Allergic/Immunologic: Negative. Neurological: Negative. Hematological: Negative. Psychiatric/Behavioral: The patient is nervous/anxious. Past family and social history unremarkable. Diagnosis Orders   1. Uncontrolled type 1 diabetes mellitus with hyperglycemia (HCC)  POCT glycosylated hemoglobin (Hb A1C)   2. Iron deficiency anemia due to chronic blood loss     3. Vertigo     4. Other hyperlipidemia     5. Chronic obstructive pulmonary disease, unspecified COPD type ([address]     6. Influenza vaccination declined     7. Pneumococcal vaccination declined     8.  Smoker 9. Edema of both lower extremities due to peripheral venous insufficiency     10. Oxygen dependent     11. Chronic systolic congestive heart failure ([address]     12. COVID-19 vaccination declined     15. Benign essential HTN           Objective:   Physical Exam  Vitals and nursing note reviewed. Constitutional:       Appearance: She is well-developed. HENT:      Head: Normocephalic and atraumatic. Right Ear: External ear normal.      Left Ear: External ear normal.   Eyes:      Conjunctiva/sclera: Conjunctivae normal.      Pupils: Pupils are equal, round, and reactive to light. Cardiovascular:      Rate and Rhythm: Normal rate and regular rhythm. Heart sounds: Normal heart sounds. Comments: Coronary artery disease  Chronic systolic congestive heart failure  Chronic A. fib-rate controlled  Uncontrolled insulin-dependent diabetes mellitus  Hypertension  Hyperlipidemia  Pulmonary:      Effort: Pulmonary effort is normal.      Breath sounds: Normal breath sounds. Comments: COPD-smoker  Home O2 dependent  Abdominal:      General: Bowel sounds are normal.      Palpations: Abdomen is soft. Comments: Chronic anemia  GERD   Genitourinary:     Vagina: Normal.   Musculoskeletal:         General: Normal range of motion. Cervical back: Normal range of motion and neck supple. Comments: Degenerative polyarthralgia   Skin:     General: Skin is warm and dry. Neurological:      Mental Status: She is alert and oriented to person, place, and time. Deep Tendon Reflexes: Reflexes are normal and symmetric. Psychiatric:      Comments: Declined age-appropriate vaccinations         Assessment:       Diagnosis Orders   1. Uncontrolled type 1 diabetes mellitus with hyperglycemia (HCC)  POCT glycosylated hemoglobin (Hb A1C)   2. Iron deficiency anemia due to chronic blood loss     3. Vertigo     4. Other hyperlipidemia     5.  Chronic obstructive pulmonary disease, unspecified COPD type ([address] 6. Influenza vaccination declined     7. Pneumococcal vaccination declined     8. Smoker     9. Edema of both lower extremities due to peripheral venous insufficiency     10. Oxygen dependent     11. Chronic systolic congestive heart failure ([facility] [address]     12. COVID-19 vaccination declined     15. Benign essential HTN             Plan:      72-year-old -American female is brought in by her granddaughter for routine follow-up. She is pleasant communicative and does not appear to be in any obvious distress  COPD. Home O2 dependent. She continued to smoke 1 pack a day and does not express any desire to quit anytime soon. Once again she is counseled, quit date, alternative to consider. She is advised to continue current inhalers  Systolic congestive heart failure. She is diuresing well in negative fluid balance. She is established with cardiology  History of KYLE gatica. She states that cardiac monitoring in last year done by cardiologist did not show any arrhythmia. She has been taken off anticoagulation because of history of intracranial bleed. She states that she was seen by neurologist.  She denies headache blurred vision or neck pain or stiffness. Uncontrolled insulin-dependent diabetes mellitus. A1c done today worsened from 9.4-9.6. She is noncompliant with ADA 1800 diet, Accu-Chek logs and lifestyle change. In January 2022 she was scheduled to be seen by endocrinologist, however, he states that he has not been contacted. We will reconsult. In the meantime she is advised to change Lantus from 56 units at night to 30 units twice daily. Continue sliding scale Novolin. Caution hypoglycemia and call if there is any concern  She is advised to update annual dilated eye exam  Hypertension. We aim to keep her blood pressure equal to less than 130/80. Optimize diabetes, quit tobacco and consume less than 2 g of salt a day  Monofilament testing is unremarkable.   She is counseled on diabetic foot care  Degenerative polyarthralgia. May take over-the-counter Tylenol as needed  Once again she declined all age-appropriate vaccinations  History of diabetic nephropathy. However labs done in January 2022 shows creatinine of 0.98. Anemia. Hemoglobin stable at 11.5. Secondary polycythemia with underlying history of smoking. Risk factor stratification is advised  Med list and available labs reviewed, discussed with patient, questions answered    This note is created with a voice recognition program and while intend to generate a document that accurately reflects the content of the visit, no guarantee can be provided that every mistake has been identified and corrected by editing.        Bronson Marroquin MD

## 2022-05-16 DIAGNOSIS — J45.20 MILD INTERMITTENT ASTHMA WITHOUT COMPLICATION: ICD-10-CM

## 2022-05-16 DIAGNOSIS — J44.1 COPD WITH ACUTE EXACERBATION (HCC): ICD-10-CM

## 2022-05-16 DIAGNOSIS — E10.65 UNCONTROLLED TYPE 1 DIABETES MELLITUS WITH HYPERGLYCEMIA (HCC): ICD-10-CM

## 2022-05-16 NOTE — TELEPHONE ENCOUNTER
----- Message from Bonny Gaudencio sent at 5/16/2022  4:23 PM EDT -----  Subject: Medication Problem    QUESTIONS  Name of Medication? tiotropium (SPIRIVA HANDIHALER) 18 MCG inhalation   capsule  Patient-reported dosage and instructions? inhaler  What question or problem do you have with the medication? daughter called   in and the new pharmacy did not have an RX for this medication. said she   called and they do not have anything there for her. Preferred Pharmacy? [facility], [address] [facility] - [address]   [phone] Marianne Carranzajacquie [phone]  Pharmacy phone number (if available)? [phone]  Additional Information for Provider?   ---------------------------------------------------------------------------  --------------  CALL BACK INFO  What is the best way for the office to contact you? OK to leave message on   voicemail  Preferred Call Back Phone Number? [phone]  ---------------------------------------------------------------------------  --------------  SCRIPT ANSWERS  Relationship to Patient? Other  Representative Name? Julito You  Is the Representative on the appropriate HIPAA document in Epic?  Yes

## 2022-05-16 NOTE — TELEPHONE ENCOUNTER
----- Message from [address] [place] [address] sent at 5/16/2022  4:22 PM EDT -----  Subject: Medication Problem    QUESTIONS  Name of Medication? PROAIR  (90 Base) MCG/ACT inhaler  Patient-reported dosage and instructions? inhaler  What question or problem do you have with the medication? daughter called   in and the new pharmacy did not have an RX for this medication. said she   called and they do not have anything there for her. [facility] [address] [place] [address]   [phone] Eboni Solomon [phone]  Pharmacy phone number (if available)? [phone]  Additional Information for Provider?   ---------------------------------------------------------------------------  --------------  CALL BACK INFO  What is the best way for the office to contact you? OK to leave message on   voicemail  Preferred Call Back Phone Number? [phone]  ---------------------------------------------------------------------------  --------------  SCRIPT ANSWERS  Relationship to Patient? Other  Representative Name? Karen Simmons (Daughter)  Is the Representative on the appropriate HIPAA document in Epic?  Yes

## 2022-05-17 RX ORDER — ALBUTEROL SULFATE 90 UG/1
AEROSOL, METERED RESPIRATORY (INHALATION)
Qty: 8.5 G | Refills: 3 | Status: SHIPPED | OUTPATIENT
Start: 2022-05-17 | End: 2022-09-15

## 2022-05-17 RX ORDER — TIOTROPIUM BROMIDE 18 UG/1
18 CAPSULE ORAL; RESPIRATORY (INHALATION) DAILY
Qty: 90 CAPSULE | Refills: 1 | Status: SHIPPED | OUTPATIENT
Start: 2022-05-17

## 2022-05-20 RX ORDER — INSULIN GLARGINE 100 [IU]/ML
30 INJECTION, SOLUTION SUBCUTANEOUS 2 TIMES DAILY
Qty: 5 PEN | Refills: 2 | Status: SHIPPED | OUTPATIENT
Start: 2022-05-20 | End: 2022-06-09 | Stop reason: DRUGHIGH

## 2022-05-20 NOTE — TELEPHONE ENCOUNTER
Patient home care nurse calling states patient is completely out of lantus and the pharmacy that medication was sent to does not provide her with insulin just pills  Medication was sent danita

## 2022-05-23 RX ORDER — LOSARTAN POTASSIUM 100 MG/1
TABLET ORAL
Qty: 90 TABLET | Refills: 0 | Status: SHIPPED | OUTPATIENT
Start: 2022-05-23 | End: 2022-08-19

## 2022-05-23 RX ORDER — METFORMIN HYDROCHLORIDE 500 MG/1
TABLET, EXTENDED RELEASE ORAL
Qty: 180 TABLET | Refills: 0 | Status: SHIPPED | OUTPATIENT
Start: 2022-05-23 | End: 2022-08-19

## 2022-05-23 RX ORDER — SPIRONOLACTONE 25 MG/1
TABLET ORAL
Qty: 90 TABLET | Refills: 0 | Status: SHIPPED | OUTPATIENT
Start: 2022-05-23 | End: 2022-08-19

## 2022-05-23 RX ORDER — POTASSIUM CHLORIDE 20 MEQ/1
TABLET, EXTENDED RELEASE ORAL
Qty: 180 TABLET | Refills: 0 | Status: SHIPPED | OUTPATIENT
Start: 2022-05-23 | End: 2022-08-19

## 2022-05-23 RX ORDER — ATORVASTATIN CALCIUM 40 MG/1
TABLET, FILM COATED ORAL
Qty: 90 TABLET | Refills: 0 | Status: SHIPPED | OUTPATIENT
Start: 2022-05-23 | End: 2022-08-19

## 2022-05-23 RX ORDER — METOPROLOL SUCCINATE 50 MG/1
TABLET, EXTENDED RELEASE ORAL
Qty: 180 TABLET | Refills: 0 | Status: SHIPPED | OUTPATIENT
Start: 2022-05-23 | End: 2022-08-19

## 2022-05-23 RX ORDER — FUROSEMIDE 40 MG/1
TABLET ORAL
Qty: 90 TABLET | Refills: 0 | Status: SHIPPED | OUTPATIENT
Start: 2022-05-23 | End: 2022-08-19

## 2022-06-09 ENCOUNTER — OFFICE VISIT (OUTPATIENT)
Dept: INTERNAL MEDICINE CLINIC | Age: 77
End: 2022-06-09
Payer: MEDICARE

## 2022-06-09 VITALS
TEMPERATURE: 97.1 F | BODY MASS INDEX: 31.64 KG/M2 | DIASTOLIC BLOOD PRESSURE: 86 MMHG | OXYGEN SATURATION: 95 % | WEIGHT: 201.6 LBS | HEART RATE: 49 BPM | HEIGHT: 67 IN | SYSTOLIC BLOOD PRESSURE: 142 MMHG

## 2022-06-09 DIAGNOSIS — E78.49 OTHER HYPERLIPIDEMIA: ICD-10-CM

## 2022-06-09 DIAGNOSIS — J44.9 CHRONIC OBSTRUCTIVE PULMONARY DISEASE, UNSPECIFIED COPD TYPE (HCC): ICD-10-CM

## 2022-06-09 DIAGNOSIS — Z28.21 PNEUMOCOCCAL VACCINATION DECLINED: ICD-10-CM

## 2022-06-09 DIAGNOSIS — D50.0 IRON DEFICIENCY ANEMIA DUE TO CHRONIC BLOOD LOSS: ICD-10-CM

## 2022-06-09 DIAGNOSIS — Z28.21 COVID-19 VACCINATION DECLINED: ICD-10-CM

## 2022-06-09 DIAGNOSIS — I87.2 EDEMA OF BOTH LOWER EXTREMITIES DUE TO PERIPHERAL VENOUS INSUFFICIENCY: ICD-10-CM

## 2022-06-09 DIAGNOSIS — Z28.21 INFLUENZA VACCINATION DECLINED: ICD-10-CM

## 2022-06-09 DIAGNOSIS — E10.65 UNCONTROLLED TYPE 1 DIABETES MELLITUS WITH HYPERGLYCEMIA (HCC): Primary | ICD-10-CM

## 2022-06-09 DIAGNOSIS — I50.20 SYSTOLIC CONGESTIVE HEART FAILURE, UNSPECIFIED HF CHRONICITY (HCC): ICD-10-CM

## 2022-06-09 DIAGNOSIS — Z99.81 OXYGEN DEPENDENT: ICD-10-CM

## 2022-06-09 DIAGNOSIS — F17.200 SMOKER: ICD-10-CM

## 2022-06-09 DIAGNOSIS — R42 VERTIGO: ICD-10-CM

## 2022-06-09 DIAGNOSIS — I10 BENIGN ESSENTIAL HTN: ICD-10-CM

## 2022-06-09 PROCEDURE — G8417 CALC BMI ABV UP PARAM F/U: HCPCS | Performed by: FAMILY MEDICINE

## 2022-06-09 PROCEDURE — 3023F SPIROM DOC REV: CPT | Performed by: FAMILY MEDICINE

## 2022-06-09 PROCEDURE — 99214 OFFICE O/P EST MOD 30 MIN: CPT | Performed by: FAMILY MEDICINE

## 2022-06-09 PROCEDURE — G8427 DOCREV CUR MEDS BY ELIG CLIN: HCPCS | Performed by: FAMILY MEDICINE

## 2022-06-09 PROCEDURE — 4004F PT TOBACCO SCREEN RCVD TLK: CPT | Performed by: FAMILY MEDICINE

## 2022-06-09 PROCEDURE — 1123F ACP DISCUSS/DSCN MKR DOCD: CPT | Performed by: FAMILY MEDICINE

## 2022-06-09 PROCEDURE — 3046F HEMOGLOBIN A1C LEVEL >9.0%: CPT | Performed by: FAMILY MEDICINE

## 2022-06-09 PROCEDURE — G8399 PT W/DXA RESULTS DOCUMENT: HCPCS | Performed by: FAMILY MEDICINE

## 2022-06-09 PROCEDURE — 1090F PRES/ABSN URINE INCON ASSESS: CPT | Performed by: FAMILY MEDICINE

## 2022-06-09 RX ORDER — INSULIN GLARGINE 100 [IU]/ML
38 INJECTION, SOLUTION SUBCUTANEOUS 2 TIMES DAILY
Qty: 5 PEN | Refills: 2 | Status: SHIPPED | COMMUNITY
Start: 2022-06-09 | End: 2022-10-18 | Stop reason: ALTCHOICE

## 2022-06-09 ASSESSMENT — ENCOUNTER SYMPTOMS
ALLERGIC/IMMUNOLOGIC NEGATIVE: 1
EYES NEGATIVE: 1
RESPIRATORY NEGATIVE: 1
GASTROINTESTINAL NEGATIVE: 1

## 2022-06-09 NOTE — PROGRESS NOTES
Subjective:      Patient ID: Mirta Moon is a 68 y.o. female. Diabetes  She presents for her follow-up diabetic visit. She has type 1 diabetes mellitus. Her disease course has been worsening. There are no hypoglycemic associated symptoms. There are no diabetic associated symptoms. There are no hypoglycemic complications. Symptoms are stable. Diabetic complications include heart disease. Risk factors for coronary artery disease include diabetes mellitus, dyslipidemia, hypertension, sedentary lifestyle, post-menopausal and tobacco exposure. Current diabetic treatment includes insulin injections and oral agent (monotherapy). She is compliant with treatment all of the time. Her weight is stable. She is following a generally unhealthy, high fat/cholesterol, high salt and low fiber diet. Meal planning includes ADA exchanges, avoidance of concentrated sweets, calorie counting and carbohydrate counting. She has had a previous visit with a dietitian. She participates in exercise three times a week. Her home blood glucose trend is fluctuating minimally. An ACE inhibitor/angiotensin II receptor blocker is being taken. Review of Systems   Constitutional: Negative. HENT: Negative. Eyes: Negative. Respiratory: Negative. Cardiovascular: Negative. Gastrointestinal: Negative. Endocrine: Negative. Musculoskeletal: Positive for arthralgias. Skin: Negative. Allergic/Immunologic: Negative. Neurological: Negative. Hematological: Negative. Psychiatric/Behavioral: Negative. Past family and social history unremarkable. Diagnosis Orders   1. Uncontrolled type 1 diabetes mellitus with hyperglycemia (HCC)     2. Vertigo     3. Iron deficiency anemia due to chronic blood loss     4. Other hyperlipidemia     5. Chronic obstructive pulmonary disease, unspecified COPD type ([facility] [address]     6. Influenza vaccination declined     7. Pneumococcal vaccination declined     8. Smoker     9.  Edema of both lower extremities due to peripheral venous insufficiency     10. Oxygen dependent     11. Systolic congestive heart failure, unspecified HF chronicity ([address]     12. COVID-19 vaccination declined     15. Benign essential HTN           Objective:   Physical Exam  Vitals and nursing note reviewed. Constitutional:       Appearance: She is well-developed. HENT:      Head: Normocephalic and atraumatic. Right Ear: External ear normal.      Left Ear: External ear normal.   Eyes:      Conjunctiva/sclera: Conjunctivae normal.      Pupils: Pupils are equal, round, and reactive to light. Cardiovascular:      Rate and Rhythm: Normal rate and regular rhythm. Heart sounds: Normal heart sounds. Comments: Heart failure  Uncontrolled insulin-dependent diabetes mellitus  Hypertension  Hyperlipidemia  Pulmonary:      Effort: Pulmonary effort is normal.      Breath sounds: Normal breath sounds. Comments: COPD-smoker  Abdominal:      General: Bowel sounds are normal.      Palpations: Abdomen is soft. Comments: Iron deficiency anemia   Genitourinary:     Vagina: Normal.   Musculoskeletal:         General: Normal range of motion. Cervical back: Normal range of motion and neck supple. Comments: Degenerative polyarthralgia   Skin:     General: Skin is warm and dry. Neurological:      Mental Status: She is alert and oriented to person, place, and time. Deep Tendon Reflexes: Reflexes are normal and symmetric. Psychiatric:      Comments: Declined all age-appropriate vaccinations         Assessment:       Diagnosis Orders   1. Uncontrolled type 1 diabetes mellitus with hyperglycemia (HCC)     2. Vertigo     3. Iron deficiency anemia due to chronic blood loss     4. Other hyperlipidemia     5. Chronic obstructive pulmonary disease, unspecified COPD type ([address]     6. Influenza vaccination declined     7. Pneumococcal vaccination declined     8. Smoker     9.  Edema of both lower extremities due to peripheral venous insufficiency     10. Oxygen dependent     11. Systolic congestive heart failure, unspecified HF chronicity ([facility] [address]     12. COVID-19 vaccination declined     15. Benign essential HTN             Plan:      79-year-old -American female is brought in by her granddaughter for routine follow-up. She is pleasant communicative and does not appear to be in any obvious distress  Insulin-dependent diabetes mellitus. Review of the record shows that since 10/2021 there has been progressive worsening of A1c currently at 9.7. Through his last visit, she was scheduled to be seen by endocrinology pending face-to-face evaluation. Accu-Chek log shows significant fluctuation. She is noncompliant with ADA 1800 diet. I do long discussion with this individual regarding importance of euglycemia cardiovascular, cerebrovascular complications. She voices complete understanding. She is advised to increase Lantus from 35 units to 38 units twice daily. Continue metformin. Caution hypoglycemia. Further recommendation per endocrinology  Hypertension. We aim to keep her blood pressure equal to less than 130/80. Optimize diabetes, consume less than 2 g of salt a day, quit smoking  Hyperlipidemia on statin that she is tolerating well  She is advised to update annual dilated eye exam  COPD. She continued to smoke despite advice. Once again she is counseled, quit date, alternative to consider. Continue current inhalers  Degenerative polyarthralgia. May take over-the-counter Tylenol as needed  Depression screen is negative. Patient states that she lives independently however her daughter lives upstairs.   She has good family support  She denies excessive alcohol or illicit drug use  Once again she declined all age-appropriate vaccinations  Med list reviewed advised to continue with insulin adjustment as dictated above  Call for any concern  She has home health care in place  This note is created with a voice recognition program and while intend to generate a document that accurately reflects the content of the visit, no guarantee can be provided that every mistake has been identified and corrected by editing.           Shelia Girard MD

## 2022-06-27 ENCOUNTER — TELEPHONE (OUTPATIENT)
Dept: INTERNAL MEDICINE CLINIC | Age: 77
End: 2022-06-27

## 2022-06-27 RX ORDER — INSULIN GLARGINE 100 [IU]/ML
38 INJECTION, SOLUTION SUBCUTANEOUS 2 TIMES DAILY
Qty: 5 PEN | Refills: 3 | Status: SHIPPED | OUTPATIENT
Start: 2022-06-27 | End: 2022-08-16 | Stop reason: SDUPTHER

## 2022-06-27 NOTE — TELEPHONE ENCOUNTER
Received letter from [facility] stating insulin glar would not be covered.   switched to alternative lantus per Dr Ordoñez Began

## 2022-07-05 RX ORDER — INSULIN GLARGINE-YFGN 100 [IU]/ML
INJECTION, SOLUTION SUBCUTANEOUS
Qty: 15 ML | Refills: 1 | Status: SHIPPED | OUTPATIENT
Start: 2022-07-05 | End: 2022-08-31

## 2022-07-05 NOTE — TELEPHONE ENCOUNTER
Ganesh Garcia is calling to request a refill on the following medication(s):    Medication Request:  Requested Prescriptions     Pending Prescriptions Disp Refills    Insulin Glargine-yfgn 100 UNIT/ML SOPN [Pharmacy Med Name: insulin glargine-yfgn (U-100) 100 unit/mL (3 mL) subcutaneous pen] 15 mL 1     Sig: INJECT 30 SUBCUTANEOUSLY UNITS TWICE DAILY (BULK)       Last Visit Date (If Applicable):  3/4/0672    Next Visit Date:    9/2/2022

## 2022-08-15 NOTE — TELEPHONE ENCOUNTER
Tobias Perez is calling to request a refill on the following medication(s):    Last Visit Date (If Applicable):  8/4/4967    Next Visit Date:    9/19/2022    Medication Request:  Requested Prescriptions     Pending Prescriptions Disp Refills    LANTUS SOLOSTAR 100 UNIT/ML injection pen 5 pen 3     Sig: Inject 38 Units into the skin in the morning and 38 Units before bedtime.     HUMALOG KWIKPEN 100 UNIT/ML SOPN 6 pen 3     Sig: Inject 2-16 units three times a day per Sliding scale
WDL

## 2022-08-16 RX ORDER — INSULIN GLARGINE 100 [IU]/ML
38 INJECTION, SOLUTION SUBCUTANEOUS 2 TIMES DAILY
Qty: 5 PEN | Refills: 3 | Status: SHIPPED
Start: 2022-08-16 | End: 2022-10-18 | Stop reason: ALTCHOICE

## 2022-08-16 RX ORDER — INSULIN LISPRO 100 [IU]/ML
INJECTION, SOLUTION INTRAVENOUS; SUBCUTANEOUS
Qty: 6 PEN | Refills: 3 | Status: SHIPPED | OUTPATIENT
Start: 2022-08-16

## 2022-08-19 RX ORDER — POTASSIUM CHLORIDE 20 MEQ/1
TABLET, EXTENDED RELEASE ORAL
Qty: 180 TABLET | Refills: 0 | Status: SHIPPED | OUTPATIENT
Start: 2022-08-19

## 2022-08-19 RX ORDER — ATORVASTATIN CALCIUM 40 MG/1
TABLET, FILM COATED ORAL
Qty: 90 TABLET | Refills: 0 | Status: SHIPPED | OUTPATIENT
Start: 2022-08-19

## 2022-08-19 RX ORDER — METOPROLOL SUCCINATE 50 MG/1
TABLET, EXTENDED RELEASE ORAL
Qty: 180 TABLET | Refills: 0 | Status: SHIPPED | OUTPATIENT
Start: 2022-08-19

## 2022-08-19 RX ORDER — LOSARTAN POTASSIUM 100 MG/1
TABLET ORAL
Qty: 90 TABLET | Refills: 0 | Status: SHIPPED | OUTPATIENT
Start: 2022-08-19

## 2022-08-19 RX ORDER — METFORMIN HYDROCHLORIDE 500 MG/1
TABLET, EXTENDED RELEASE ORAL
Qty: 180 TABLET | Refills: 0 | Status: SHIPPED | OUTPATIENT
Start: 2022-08-19

## 2022-08-19 RX ORDER — SPIRONOLACTONE 25 MG/1
TABLET ORAL
Qty: 90 TABLET | Refills: 0 | Status: SHIPPED | OUTPATIENT
Start: 2022-08-19

## 2022-08-19 RX ORDER — FUROSEMIDE 40 MG/1
TABLET ORAL
Qty: 90 TABLET | Refills: 0 | Status: SHIPPED | OUTPATIENT
Start: 2022-08-19

## 2022-08-19 NOTE — TELEPHONE ENCOUNTER
Carlos Odonnell is calling to request a refill on the following medication(s):    Medication Request:  Requested Prescriptions     Pending Prescriptions Disp Refills    spironolactone (ALDACTONE) 25 MG tablet [Pharmacy Med Name: spironolactone 25 mg tablet] 90 tablet 0     Sig: TAKE ONE TABLET BY MOUTH DAILY AT 9AM    atorvastatin (LIPITOR) 40 MG tablet [Pharmacy Med Name: atorvastatin 40 mg tablet] 90 tablet 0     Sig: TAKE ONE TABLET BY MOUTH DAILY AT 5PM    metFORMIN (GLUCOPHAGE-XR) 500 MG extended release tablet [Pharmacy Med Name: metformin  mg tablet,extended release 24 hr] 180 tablet 0     Sig: TAKE ONE TABLET BY MOUTH TWICE DAILY @ 9AM & 5PM    furosemide (LASIX) 40 MG tablet [Pharmacy Med Name: furosemide 40 mg tablet] 90 tablet 0     Sig: TAKE ONE TABLET BY MOUTH DAILY AT 9AM    potassium chloride (KLOR-CON M) 20 MEQ extended release tablet [Pharmacy Med Name: potassium chloride ER 20 mEq tablet,extended release(part/cryst)] 180 tablet 0     Sig: TAKE ONE TABLET BY MOUTH TWICE DAILY @ 9AM & 5PM    losartan (COZAAR) 100 MG tablet [Pharmacy Med Name: losartan 100 mg tablet] 90 tablet 0     Sig: TAKE ONE TABLET BY MOUTH DAILY AT 9AM    metoprolol succinate (TOPROL XL) 50 MG extended release tablet [Pharmacy Med Name: metoprolol succinate ER 50 mg tablet,extended release 24 hr] 180 tablet 0     Sig: TAKE ONE TABLET BY MOUTH TWICE DAILY @ 9AM & 5PM     Last filled 5/23/22 90 day no refill  Order pending    Last Visit Date (If Applicable):  8/3/9866    Next Visit Date:    9/19/2022

## 2022-08-31 RX ORDER — INSULIN GLARGINE 300 U/ML
INJECTION, SOLUTION SUBCUTANEOUS
Qty: 15 ML | Refills: 1 | Status: SHIPPED | OUTPATIENT
Start: 2022-08-31 | End: 2022-10-18

## 2022-08-31 NOTE — TELEPHONE ENCOUNTER
Last visit: 6/9/22  Last Med refill: 8/16/22  Does patient have enough medication for 72 hours:   Next Visit Date:  Future Appointments   Date Time Provider Toni Briana   9/19/2022  9:45 AM Estephanie Woods MD [address] [place] [address] Maintenance   Topic Date Due    DTaP/Tdap/Td vaccine (1 - Tdap) Never done    Low dose CT lung screening  Never done    Annual Wellness Visit (AWV)  08/01/2021    COVID-19 Vaccine (3 - Booster for Alexia series) 05/28/2022    Shingles vaccine (1 of 2) 01/28/2023 (Originally 12/3/1995)    Pneumococcal 65+ years Vaccine (1 - PCV) 05/09/2023 (Originally 12/3/1951)    Flu vaccine (1) 09/01/2022    Lipids  01/28/2023    Depression Screen  05/09/2023    DEXA (modify frequency per FRAX score)  Completed    Hepatitis C screen  Completed    Hepatitis A vaccine  Aged Out    Hib vaccine  Aged Out    Meningococcal (ACWY) vaccine  Aged Out       Hemoglobin A1C (%)   Date Value   05/09/2022 9.7   01/28/2022 9.4 (H)   10/27/2021 9.1 (H)             ( goal A1C is < 7)   Microalb/Crt.  Ratio (mcg/mg creat)   Date Value   01/28/2022 657 (H)     LDL Cholesterol (mg/dL)   Date Value   01/28/2022 31   10/27/2021 30     LDL Calculated (mg/dL)   Date Value   01/08/2014 83       (goal LDL is <100)   AST (U/L)   Date Value   01/28/2022 8     ALT (U/L)   Date Value   01/28/2022 7     BUN (mg/dL)   Date Value   01/28/2022 11     BP Readings from Last 3 Encounters:   06/09/22 (!) 142/86   05/09/22 136/86   01/28/22 128/70          (goal 120/80)    All Future Testing planned in [facility]  Lab Frequency Next Occurrence               Patient Active Problem List:     Vertigo     Iron deficiency anemia due to chronic blood loss     Other hyperlipidemia     Chronic obstructive pulmonary disease (HCC)     Influenza vaccination declined     Pneumococcal vaccination declined     Smoker     Edema of both lower extremities due to peripheral venous insufficiency     Oxygen dependent     Systolic congestive heart failure ([facility]     COVID-19 vaccination declined     Benign essential HTN     Uncontrolled type 1 diabetes mellitus with hyperglycemia ([facility]

## 2022-09-01 ENCOUNTER — HOSPITAL ENCOUNTER (OUTPATIENT)
Age: 77
Setting detail: SPECIMEN
Discharge: HOME | End: 2022-09-01

## 2022-09-01 LAB
ALBUMIN SERPL-MCNC: 3.7 G/DL (ref 3.5–5.2)
ALBUMIN/GLOBULIN RATIO: 1 (ref 1–2.5)
ALP BLD-CCNC: 156 U/L (ref 35–104)
ALT SERPL-CCNC: 8 U/L (ref 5–33)
ANION GAP SERPL CALCULATED.3IONS-SCNC: 15 MMOL/L (ref 9–17)
AST SERPL-CCNC: 11 U/L
BILIRUB SERPL-MCNC: 0.2 MG/DL (ref 0.3–1.2)
BUN BLDV-MCNC: 26 MG/DL (ref 8–23)
C-PEPTIDE: 3 NG/ML (ref 1.1–4.4)
CALCIUM SERPL-MCNC: 9 MG/DL (ref 8.6–10.4)
CHLORIDE BLD-SCNC: 104 MMOL/L (ref 98–107)
CHOLESTEROL, FASTING: 108 MG/DL
CHOLESTEROL/HDL RATIO: 2.8
CO2: 18 MMOL/L (ref 20–31)
CREAT SERPL-MCNC: 1.37 MG/DL (ref 0.5–0.9)
CREATININE URINE: 141.1 MG/DL (ref 28–217)
GFR AFRICAN AMERICAN: 45 ML/MIN
GFR NON-AFRICAN AMERICAN: 37 ML/MIN
GFR SERPL CREATININE-BSD FRML MDRD: ABNORMAL ML/MIN/{1.73_M2}
GLUCOSE FASTING: 229 MG/DL (ref 70–99)
HDLC SERPL-MCNC: 39 MG/DL
LDL CHOLESTEROL: 26 MG/DL (ref 0–130)
MICROALBUMIN/CREAT 24H UR: 497 MG/L
MICROALBUMIN/CREAT UR-RTO: 352 MCG/MG CREAT
POTASSIUM SERPL-SCNC: 4.3 MMOL/L (ref 3.7–5.3)
SODIUM BLD-SCNC: 137 MMOL/L (ref 135–144)
THYROXINE, FREE: 1.11 NG/DL (ref 0.93–1.7)
TOTAL PROTEIN: 7.4 G/DL (ref 6.4–8.3)
TRIGLYCERIDE, FASTING: 215 MG/DL
TSH SERPL DL<=0.05 MIU/L-ACNC: 1.68 UIU/ML (ref 0.3–5)
VITAMIN B-12: 484 PG/ML (ref 232–1245)
VITAMIN D 25-HYDROXY: 22.7 NG/ML

## 2022-09-14 DIAGNOSIS — J45.20 MILD INTERMITTENT ASTHMA WITHOUT COMPLICATION: ICD-10-CM

## 2022-09-14 NOTE — TELEPHONE ENCOUNTER
Anna Mitchell is calling to request a refill on the following medication(s):    Medication Request:  Requested Prescriptions     Pending Prescriptions Disp Refills    albuterol sulfate HFA (PROVENTIL;VENTOLIN;PROAIR) 108 (90 Base) MCG/ACT inhaler [Pharmacy Med Name: albuterol sulfate HFA 90 mcg/actuation aerosol inhaler] 8.5 g 3     Sig: INHALE TWO PUFFS BY MOUTH EVERY 6 HOURS AS NEEDED FOR WHEEZING AS NEEDED       Last Visit Date (If Applicable):  8/1/3969    Next Visit Date:    9/19/2022

## 2022-09-15 RX ORDER — ALBUTEROL SULFATE 90 UG/1
AEROSOL, METERED RESPIRATORY (INHALATION)
Qty: 8.5 G | Refills: 3 | Status: SHIPPED | OUTPATIENT
Start: 2022-09-15

## 2022-09-19 ENCOUNTER — TELEMEDICINE (OUTPATIENT)
Dept: INTERNAL MEDICINE CLINIC | Age: 77
End: 2022-09-19
Payer: MEDICARE

## 2022-09-19 DIAGNOSIS — Z28.21 INFLUENZA VACCINATION DECLINED: ICD-10-CM

## 2022-09-19 DIAGNOSIS — Z28.21 PNEUMOCOCCAL VACCINATION DECLINED: ICD-10-CM

## 2022-09-19 DIAGNOSIS — J44.9 CHRONIC OBSTRUCTIVE PULMONARY DISEASE, UNSPECIFIED COPD TYPE (HCC): ICD-10-CM

## 2022-09-19 DIAGNOSIS — Z28.21 COVID-19 VACCINATION DECLINED: ICD-10-CM

## 2022-09-19 DIAGNOSIS — I10 BENIGN ESSENTIAL HTN: ICD-10-CM

## 2022-09-19 DIAGNOSIS — Z99.81 OXYGEN DEPENDENT: ICD-10-CM

## 2022-09-19 DIAGNOSIS — E10.65 UNCONTROLLED TYPE 1 DIABETES MELLITUS WITH HYPERGLYCEMIA (HCC): Primary | ICD-10-CM

## 2022-09-19 DIAGNOSIS — F17.200 SMOKER: ICD-10-CM

## 2022-09-19 DIAGNOSIS — E78.49 OTHER HYPERLIPIDEMIA: ICD-10-CM

## 2022-09-19 DIAGNOSIS — I50.22 CHRONIC SYSTOLIC CONGESTIVE HEART FAILURE (HCC): ICD-10-CM

## 2022-09-19 DIAGNOSIS — R42 VERTIGO: ICD-10-CM

## 2022-09-19 DIAGNOSIS — D50.0 IRON DEFICIENCY ANEMIA DUE TO CHRONIC BLOOD LOSS: ICD-10-CM

## 2022-09-19 DIAGNOSIS — I87.2 EDEMA OF BOTH LOWER EXTREMITIES DUE TO PERIPHERAL VENOUS INSUFFICIENCY: ICD-10-CM

## 2022-09-19 PROCEDURE — 99214 OFFICE O/P EST MOD 30 MIN: CPT | Performed by: FAMILY MEDICINE

## 2022-09-19 PROCEDURE — 3046F HEMOGLOBIN A1C LEVEL >9.0%: CPT | Performed by: FAMILY MEDICINE

## 2022-09-19 PROCEDURE — 1123F ACP DISCUSS/DSCN MKR DOCD: CPT | Performed by: FAMILY MEDICINE

## 2022-09-19 PROCEDURE — 4004F PT TOBACCO SCREEN RCVD TLK: CPT | Performed by: FAMILY MEDICINE

## 2022-09-19 PROCEDURE — 3023F SPIROM DOC REV: CPT | Performed by: FAMILY MEDICINE

## 2022-09-19 PROCEDURE — G8399 PT W/DXA RESULTS DOCUMENT: HCPCS | Performed by: FAMILY MEDICINE

## 2022-09-19 PROCEDURE — G8417 CALC BMI ABV UP PARAM F/U: HCPCS | Performed by: FAMILY MEDICINE

## 2022-09-19 PROCEDURE — 1090F PRES/ABSN URINE INCON ASSESS: CPT | Performed by: FAMILY MEDICINE

## 2022-09-19 PROCEDURE — G8428 CUR MEDS NOT DOCUMENT: HCPCS | Performed by: FAMILY MEDICINE

## 2022-09-19 NOTE — PROGRESS NOTES
Subjective:      Patient ID: Celeste Barraza is a 68 y.o. female. 2022    TELEHEALTH EVALUATION -- Audio/Visual (During [facility] public health emergency)    HPI:    Celeste Barraza (:  1945) has requested an audio/video evaluation for the following concern(s):    Follow-up on uncontrolled insulin-dependent diabetes mellitus  Hypertension  Hyperlipidemia  Degenerative polyarthropathy  Smoker  Noncompliance with diet  Diabetic nephropathy  COPD  Heart failure  Declined all age-appropriate vaccinations    Review of Systems    Prior to Visit Medications    Medication Sig Taking? Authorizing Provider   spironolactone (ALDACTONE) 25 MG tablet TAKE ONE TABLET BY MOUTH DAILY AT 9AM  Lisa Scanlon MD   atorvastatin (LIPITOR) 40 MG tablet TAKE ONE TABLET BY MOUTH DAILY AT [place] MD [place]   metFORMIN (GLUCOPHAGE-XR) 500 MG extended release tablet TAKE ONE TABLET BY MOUTH TWICE DAILY @ 9AM & 5PM  Lisa Scanlon MD   furosemide (LASIX) 40 MG tablet TAKE ONE TABLET BY MOUTH DAILY AT 9AM  Lisa Scanlon MD   potassium chloride (KLOR-CON M) 20 MEQ extended release tablet TAKE ONE TABLET BY MOUTH TWICE DAILY @ 9AM & 5PM  Lisa Scanlon MD   losartan (COZAAR) 100 MG tablet TAKE ONE TABLET BY MOUTH DAILY AT 9AM  Lisa Scanlon MD   metoprolol succinate (TOPROL XL) 50 MG extended release tablet TAKE ONE TABLET BY MOUTH TWICE DAILY @ 9AM & 5PM  Lisa Scanlon MD   albuterol sulfate HFA (PROVENTIL;VENTOLIN;PROAIR) 108 (90 Base) MCG/ACT inhaler INHALE TWO PUFFS BY MOUTH EVERY 6 HOURS AS NEEDED FOR WHEEZING AS NEEDED  Lisa Scanlon MD   TOUJEO MAX SOLOSTAR 300 UNIT/ML SOPN INJECT 30 UNITS SUBCUTANEOUSLY TWICE DAILY (ORIG)  Lisa Scanlon MD   LANTUS SOLOSTAR 100 UNIT/ML injection pen Inject 38 Units into the skin in the morning and 38 Units before bedtime.   Lisa Scanlon MD   HUMALOG KWIKPEN 100 UNIT/ML SOPN Inject 2-16 units three times a day per Sliding scale  Lisa Scanlon MD   insulin glargine (LANTUS SOLOSTAR) 100 UNIT/ML injection pen Inject 38 Units into the skin 2 times daily  Jolanta Edouard MD   tiotropium (SPIRIVA HANDIHALER) 18 MCG inhalation capsule Inhale 1 capsule into the lungs daily  Jolanta Edouard MD   blood glucose test strips (ONETOUCH VERIO) strip USE ONE STRIP DAILY AS NEEDED  Jolanta Edouard MD   amLODIPine (NORVASC) 5 MG tablet   Historical Provider, MD   budesonide-formoterol (SYMBICORT) 160-4.5 MCG/ACT AERO INHALE TWO PUFFS BY MOUTH TWICE A DAY  Jolanta Edouard MD   ferrous sulfate (FE TABS 325) 325 (65 Fe) MG EC tablet Take 325 mg by mouth daily  Historical Provider, MD   Insulin Pen Needle (PEN NEEDLES) 31G X 6 MM MISC USE WITH INSULIN TWICE DAILY  Jolanta Edouard MD   losartan (COZAAR) 50 MG tablet Take 1 tablet by mouth daily  Jolanta Edouard MD   Multiple Vitamin (DAILY-NESSA) TABS TAKE ONE TABLET BY MOUTH DAILY  Jolanta Edouard MD       Social History     Tobacco Use    Smoking status: Every Day     Packs/day: 1.00     Years: 30.00     Pack years: 30.00     Types: Cigarettes     Start date: 1965    Smokeless tobacco: Never   Vaping Use    Vaping Use: Never used   Substance Use Topics    Alcohol use: Yes     Comment: social    Drug use: No        No Known Allergies    PHYSICAL EXAMINATION:  [ INSTRUCTIONS:  \"[x]\" Indicates a positive item  \"[]\" Indicates a negative item  -- DELETE ALL ITEMS NOT EXAMINED]  Vital Signs: (As obtained by patient/caregiver or practitioner observation)    Blood pressure-  Heart rate-    Respiratory rate-    Temperature-  Pulse oximetry-     Constitutional: [x] Appears well-developed and well-nourished [x] No apparent distress      [] Abnormal-   Mental status  [x] Alert and awake  [x] Oriented to person/place/time [x]Able to follow commands      Eyes:  EOM    [x]  Normal  [] Abnormal-  Sclera  [x]  Normal  [] Abnormal -         Discharge [x]  None visible  [] Abnormal -    HENT:   [x] Normocephalic, atraumatic.   [] Abnormal   [x] Mouth/Throat: Mucous membranes are moist.     External Ears [x] Normal  [] Abnormal-     Neck: [x] No visualized mass     Pulmonary/Chest: [] Respiratory effort normal.  [] No visualized signs of difficulty breathing or respiratory distress        [x] Abnormal- copd-smoker     Musculoskeletal:   [] Normal gait with no signs of ataxia         [] Normal range of motion of neck        [x] Abnormal- deg polyarhtralgia      Neurological:        [x] No Facial Asymmetry (Cranial nerve 7 motor function) (limited exam to video visit)          [x] No gaze palsy        [] Abnormal-         Skin:        [x] No significant exanthematous lesions or discoloration noted on facial skin         [] Abnormal-            Psychiatric:       [] Normal Affect [] No Hallucinations        [x] Abnormal- anxciety    Other pertinent observable physical exam findings-     ASSESSMENT/PLAN:  59-year-old -American female. At the liberty of talking to the patient as well as her daughter. Patient continues to be noncompliant with ADA 1800 diet. Her A1c as of 5/2022 is 9.7. She is scheduled to be seen by her endocrinologist today. Advised to comply with current regimen of medication with further adjustment per endocrinology, ADA 1800 diet, keep Accu-Chek log for office review  We aim to keep her blood pressure equal to less than 130/80. Continue Cozaar, Aldactone. Consume less than 2 g of salt a day  Hyperlipidemia on statin that she is tolerating well  She is counseled on diabetic foot care  She is advised to update annual dilated eye exam  COPD. She continued to smoke despite advice and does not express any desire to quit anytime soon. No recent pulmonary decompensation. She is advised to continue beta agonist inhaled corticosteroid and Spiriva. Heart failure. No recent decompensation. Risk factor stratification is advised.   She is advised to follow-up with her cardiologist  Once again she declined influenza, pneumococcal vaccine and COVID-vaccine  Depression screen is negative  Med list and available labs reviewed, discussed with patient, questions answered  She is advised to continue supplemental O2    Return in about 3 months (around 12/19/2022). Yunier Garcia, was evaluated through a synchronous (real-time) audio-video encounter. The patient (or guardian if applicable) is aware that this is a billable service, which includes applicable co-pays. This Virtual Visit was conducted with patient's (and/or legal guardian's) consent. The visit was conducted pursuant to the emergency declaration under the [facility], [address] [place] [address] authority and the [employer] Act. Patient identification was verified, and a caregiver was present when appropriate. The patient was located at Home: [facility] [address] [address]. Provider was located at [facility] (Appt Dept): [address] [address]  [address] [facility],[address]  [facility],  [address] [place] [address]. Total time spent on this encounter:  25 minutes    --Carmine Sykes MD on 9/19/2022 at 10:14 AM    An electronic signature was used to authenticate this note. This note is created with a voice recognition program and while intend to generate a document that accurately reflects the content of the visit, no guarantee can be provided that every mistake has been identified and corrected by editing.       Carmine Sykes MD

## 2022-10-18 RX ORDER — INSULIN GLARGINE 300 U/ML
INJECTION, SOLUTION SUBCUTANEOUS
Qty: 15 ML | Refills: 5 | Status: SHIPPED | OUTPATIENT
Start: 2022-10-18

## 2022-10-18 NOTE — PROGRESS NOTES
Subjective:      Patient ID: Jose Luis Shah is a 68 y.o. female. HPI  A user error has taken place: encounter opened in error, closed for administrative reasons.     Review of Systems    Objective:   Physical Exam    Assessment:            Plan:              Joey Mckeon MD

## 2022-10-18 NOTE — TELEPHONE ENCOUNTER
Lemuel Snider is calling to request a refill on the following medication(s):    Medication Request:  Requested Prescriptions     Pending Prescriptions Disp Refills    TOUJEO MAX SOLOSTAR 300 UNIT/ML SOPN [Pharmacy Med Name: Toujeo Max U-300 SoloStar 300 unit/mL (3 mL) subcutaneous insulin pen] 15 mL 0     Sig: INJECT 30 UNITS SUBCUTANEOUSLY TWICE DAILY (BULK)       Last Visit Date (If Applicable):  4/61/1515    Next Visit Date:    Visit date not found

## 2022-11-16 NOTE — TELEPHONE ENCOUNTER
Chantell Lala is calling to request a refill on the following medication(s):    Medication Request:  Requested Prescriptions     Pending Prescriptions Disp Refills    metFORMIN (GLUCOPHAGE-XR) 500 MG extended release tablet 180 tablet 0     Sig: TAKE ONE TABLET BY MOUTH TWICE DAILY @ 9AM & 5PM    metoprolol succinate (TOPROL XL) 50 MG extended release tablet 180 tablet 0     Sig: TAKE ONE TABLET BY MOUTH TWICE DAILY @ 9AM & 5PM       Last Visit Date (If Applicable):  2/75/9457    Next Visit Date:    Visit date not found no

## 2022-11-28 RX ORDER — METFORMIN HYDROCHLORIDE 500 MG/1
TABLET, EXTENDED RELEASE ORAL
Qty: 180 TABLET | Refills: 0 | Status: SHIPPED | OUTPATIENT
Start: 2022-11-28

## 2022-11-28 RX ORDER — METOPROLOL SUCCINATE 50 MG/1
TABLET, EXTENDED RELEASE ORAL
Qty: 180 TABLET | Refills: 0 | Status: SHIPPED | OUTPATIENT
Start: 2022-11-28

## 2022-12-02 RX ORDER — METFORMIN HYDROCHLORIDE 500 MG/1
TABLET, EXTENDED RELEASE ORAL
Qty: 180 TABLET | Refills: 5 | OUTPATIENT
Start: 2022-12-02

## 2022-12-02 RX ORDER — METOPROLOL SUCCINATE 50 MG/1
TABLET, EXTENDED RELEASE ORAL
Qty: 180 TABLET | Refills: 5 | OUTPATIENT
Start: 2022-12-02

## 2022-12-06 DIAGNOSIS — J44.1 COPD WITH ACUTE EXACERBATION (HCC): ICD-10-CM

## 2022-12-06 DIAGNOSIS — E10.65 UNCONTROLLED TYPE 1 DIABETES MELLITUS WITH HYPERGLYCEMIA (HCC): ICD-10-CM

## 2022-12-07 RX ORDER — FUROSEMIDE 40 MG/1
TABLET ORAL
Qty: 90 TABLET | Refills: 0 | Status: SHIPPED | OUTPATIENT
Start: 2022-12-07

## 2022-12-07 RX ORDER — METFORMIN HYDROCHLORIDE 500 MG/1
TABLET, EXTENDED RELEASE ORAL
Qty: 180 TABLET | Refills: 0 | Status: SHIPPED | OUTPATIENT
Start: 2022-12-07

## 2022-12-07 RX ORDER — TIOTROPIUM BROMIDE 18 UG/1
CAPSULE ORAL; RESPIRATORY (INHALATION)
Qty: 90 CAPSULE | Refills: 0 | Status: SHIPPED | OUTPATIENT
Start: 2022-12-07

## 2022-12-07 RX ORDER — LOSARTAN POTASSIUM 100 MG/1
TABLET ORAL
Qty: 90 TABLET | Refills: 0 | Status: SHIPPED | OUTPATIENT
Start: 2022-12-07

## 2022-12-07 RX ORDER — ATORVASTATIN CALCIUM 40 MG/1
TABLET, FILM COATED ORAL
Qty: 90 TABLET | Refills: 0 | Status: SHIPPED | OUTPATIENT
Start: 2022-12-07

## 2022-12-07 RX ORDER — SPIRONOLACTONE 25 MG/1
TABLET ORAL
Qty: 90 TABLET | Refills: 0 | Status: SHIPPED | OUTPATIENT
Start: 2022-12-07

## 2022-12-07 RX ORDER — POTASSIUM CHLORIDE 20 MEQ/1
TABLET, EXTENDED RELEASE ORAL
Qty: 180 TABLET | Refills: 0 | Status: SHIPPED | OUTPATIENT
Start: 2022-12-07

## 2022-12-07 RX ORDER — METOPROLOL SUCCINATE 50 MG/1
TABLET, EXTENDED RELEASE ORAL
Qty: 180 TABLET | Refills: 0 | Status: SHIPPED | OUTPATIENT
Start: 2022-12-07

## 2023-01-17 DIAGNOSIS — J45.20 MILD INTERMITTENT ASTHMA WITHOUT COMPLICATION: ICD-10-CM

## 2023-01-17 RX ORDER — ALBUTEROL SULFATE 90 UG/1
AEROSOL, METERED RESPIRATORY (INHALATION)
Qty: 8.5 G | Refills: 3 | Status: SHIPPED | OUTPATIENT
Start: 2023-01-17

## 2023-01-17 NOTE — TELEPHONE ENCOUNTER
Ramiro Talbot is calling to request a refill on the following medication(s):    Medication Request:  Requested Prescriptions     Pending Prescriptions Disp Refills    albuterol sulfate HFA (PROVENTIL;VENTOLIN;PROAIR) 108 (90 Base) MCG/ACT inhaler [Pharmacy Med Name: albuterol sulfate HFA 90 mcg/actuation aerosol inhaler] 8.5 g 3     Sig: INHALE TWO PUFFS BY MOUTH EVERY 6 HOURS AS NEEDED FOR WHEEZING AS NEEDED (BULK)       Last Visit Date (If Applicable):  1/73/4598    Next Visit Date:    Visit date not found

## 2023-01-30 ENCOUNTER — TELEPHONE (OUTPATIENT)
Dept: INTERNAL MEDICINE CLINIC | Age: 78
End: 2023-01-30

## 2023-01-30 NOTE — TELEPHONE ENCOUNTER
Pts daughter called. Pt was admitted to [facility] on 1/5/2023 for a stroke. Then transferred to [address] [place] [address] of [address] [place] [address] on 1/12/2023. As of today 1/30/2023 they are transferring her to [facility]. BRODYI. Normal rate, regular rhythm, normal S1, S2 heart sounds heard.

## 2023-04-04 RX ORDER — LANCETS 30 GAUGE
1 EACH MISCELLANEOUS DAILY
Qty: 100 EACH | Refills: 3 | Status: SHIPPED | OUTPATIENT
Start: 2023-04-04

## 2023-04-04 NOTE — TELEPHONE ENCOUNTER
Jose Daniel Anguiano is calling to request a refill on the following medication(s):    Last Visit Date (If Applicable):  6309    Next Visit Date:    2023    Medication Request:  Requested Prescriptions     Pending Prescriptions Disp Refills    Lancets MISC 100 each 3     Si each by Does not apply route daily Approved brand per Ins.  Lancets to use daily twice a day for diabetes E11.9

## 2023-04-19 DIAGNOSIS — E10.65 UNCONTROLLED TYPE 1 DIABETES MELLITUS WITH HYPERGLYCEMIA (HCC): ICD-10-CM

## 2023-04-19 DIAGNOSIS — J44.1 COPD WITH ACUTE EXACERBATION (HCC): ICD-10-CM

## 2023-04-19 NOTE — TELEPHONE ENCOUNTER
Josue Victor is calling to request a refill on the following medication(s):    Last Visit Date (If Applicable):  7/50/4598    Next Visit Date:    7/18/2023    Medication Request:  Requested Prescriptions     Pending Prescriptions Disp Refills    metFORMIN (GLUCOPHAGE-XR) 500 MG extended release tablet 180 tablet 0     Sig: TAKE ONE TABLET BY MOUTH TWICE DAILY @ 9AM-5PM    metoprolol succinate (TOPROL XL) 50 MG extended release tablet 180 tablet 0     Sig: TAKE ONE TABLET BY MOUTH TWICE DAILY @ 9AM-5PM    tiotropium (SPIRIVA HANDIHALER) 18 MCG inhalation capsule 90 capsule 0     Sig: INHALE CONTENTS OF ONE CAPSULE BY MOUTH INTO LUNGS VIA HANDIHALER DAILY (BULK)    losartan (COZAAR) 100 MG tablet 90 tablet 0     Sig: TAKE ONE TABLET BY MOUTH DAILY AT 9AM    spironolactone (ALDACTONE) 25 MG tablet 90 tablet 0     Sig: TAKE ONE TABLET BY MOUTH DAILY AT 9AM    furosemide (LASIX) 40 MG tablet 90 tablet 0     Sig: Take 1 tablet by mouth daily

## 2023-04-19 NOTE — TELEPHONE ENCOUNTER
Tammy Sharp is calling to request a refill on the following medication(s):    Last Visit Date (If Applicable):  5/51/4575    Next Visit Date:    7/18/2023    Medication Request:  Requested Prescriptions     Pending Prescriptions Disp Refills    metFORMIN (GLUCOPHAGE-XR) 500 MG extended release tablet 180 tablet 0     Sig: TAKE ONE TABLET BY MOUTH TWICE DAILY @ 9AM-5PM    metoprolol succinate (TOPROL XL) 50 MG extended release tablet 180 tablet 0     Sig: TAKE ONE TABLET BY MOUTH TWICE DAILY @ 9AM-5PM    tiotropium (SPIRIVA HANDIHALER) 18 MCG inhalation capsule 90 capsule 0     Sig: INHALE CONTENTS OF ONE CAPSULE BY MOUTH INTO LUNGS VIA HANDIHALER DAILY (BULK)    losartan (COZAAR) 100 MG tablet 90 tablet 0     Sig: TAKE ONE TABLET BY MOUTH DAILY AT 9AM    spironolactone (ALDACTONE) 25 MG tablet 90 tablet 0     Sig: TAKE ONE TABLET BY MOUTH DAILY AT 9AM    furosemide (LASIX) 40 MG tablet 90 tablet 0     Sig: Take 1 tablet by mouth daily

## 2023-04-20 RX ORDER — TIOTROPIUM BROMIDE 18 UG/1
CAPSULE ORAL; RESPIRATORY (INHALATION)
Qty: 90 CAPSULE | Refills: 0 | Status: SHIPPED | OUTPATIENT
Start: 2023-04-20

## 2023-04-20 RX ORDER — LOSARTAN POTASSIUM 100 MG/1
TABLET ORAL
Qty: 90 TABLET | Refills: 0 | Status: SHIPPED | OUTPATIENT
Start: 2023-04-20

## 2023-04-20 RX ORDER — METOPROLOL SUCCINATE 50 MG/1
TABLET, EXTENDED RELEASE ORAL
Qty: 180 TABLET | Refills: 0 | Status: SHIPPED | OUTPATIENT
Start: 2023-04-20

## 2023-04-20 RX ORDER — SPIRONOLACTONE 25 MG/1
TABLET ORAL
Qty: 90 TABLET | Refills: 0 | Status: SHIPPED | OUTPATIENT
Start: 2023-04-20

## 2023-04-20 RX ORDER — METFORMIN HYDROCHLORIDE 500 MG/1
TABLET, EXTENDED RELEASE ORAL
Qty: 180 TABLET | Refills: 0 | Status: SHIPPED | OUTPATIENT
Start: 2023-04-20

## 2023-04-20 RX ORDER — FUROSEMIDE 40 MG/1
40 TABLET ORAL DAILY
Qty: 90 TABLET | Refills: 0 | Status: SHIPPED | OUTPATIENT
Start: 2023-04-20

## 2023-04-21 ENCOUNTER — TELEPHONE (OUTPATIENT)
Dept: INTERNAL MEDICINE CLINIC | Age: 78
End: 2023-04-21

## 2023-05-22 RX ORDER — INSULIN ASPART 100 [IU]/ML
INJECTION, SOLUTION INTRAVENOUS; SUBCUTANEOUS
Qty: 5 ADJUSTABLE DOSE PRE-FILLED PEN SYRINGE | Refills: 3 | Status: SHIPPED | OUTPATIENT
Start: 2023-05-22

## 2023-07-13 RX ORDER — LOSARTAN POTASSIUM 100 MG/1
TABLET ORAL
Qty: 90 TABLET | Refills: 1 | Status: SHIPPED | OUTPATIENT
Start: 2023-07-13

## 2023-07-13 RX ORDER — SPIRONOLACTONE 25 MG/1
TABLET ORAL
Qty: 90 TABLET | Refills: 1 | Status: SHIPPED | OUTPATIENT
Start: 2023-07-13

## 2023-07-13 RX ORDER — FUROSEMIDE 40 MG/1
TABLET ORAL
Qty: 90 TABLET | Refills: 1 | Status: SHIPPED | OUTPATIENT
Start: 2023-07-13

## 2023-08-14 ENCOUNTER — OFFICE VISIT (OUTPATIENT)
Dept: INTERNAL MEDICINE CLINIC | Age: 78
End: 2023-08-14
Payer: MEDICARE

## 2023-08-14 VITALS
BODY MASS INDEX: 31.08 KG/M2 | TEMPERATURE: 97 F | HEIGHT: 67 IN | RESPIRATION RATE: 18 BRPM | SYSTOLIC BLOOD PRESSURE: 138 MMHG | OXYGEN SATURATION: 92 % | HEART RATE: 79 BPM | WEIGHT: 198 LBS | DIASTOLIC BLOOD PRESSURE: 70 MMHG

## 2023-08-14 DIAGNOSIS — F17.200 SMOKER: ICD-10-CM

## 2023-08-14 DIAGNOSIS — D50.0 IRON DEFICIENCY ANEMIA DUE TO CHRONIC BLOOD LOSS: ICD-10-CM

## 2023-08-14 DIAGNOSIS — Z00.00 MEDICARE ANNUAL WELLNESS VISIT, SUBSEQUENT: Primary | ICD-10-CM

## 2023-08-14 DIAGNOSIS — Z28.21 PNEUMOCOCCAL VACCINATION DECLINED: ICD-10-CM

## 2023-08-14 DIAGNOSIS — R42 VERTIGO: ICD-10-CM

## 2023-08-14 DIAGNOSIS — I10 BENIGN ESSENTIAL HTN: ICD-10-CM

## 2023-08-14 DIAGNOSIS — E10.65 UNCONTROLLED TYPE 1 DIABETES MELLITUS WITH HYPERGLYCEMIA (HCC): ICD-10-CM

## 2023-08-14 DIAGNOSIS — I87.2 EDEMA OF BOTH LOWER EXTREMITIES DUE TO PERIPHERAL VENOUS INSUFFICIENCY: ICD-10-CM

## 2023-08-14 DIAGNOSIS — Z28.21 COVID-19 VACCINATION DECLINED: ICD-10-CM

## 2023-08-14 DIAGNOSIS — Z28.21 INFLUENZA VACCINATION DECLINED: ICD-10-CM

## 2023-08-14 DIAGNOSIS — E78.49 OTHER HYPERLIPIDEMIA: ICD-10-CM

## 2023-08-14 DIAGNOSIS — Z86.73 HISTORY OF CVA (CEREBROVASCULAR ACCIDENT): ICD-10-CM

## 2023-08-14 DIAGNOSIS — J44.9 CHRONIC OBSTRUCTIVE PULMONARY DISEASE, UNSPECIFIED COPD TYPE (HCC): ICD-10-CM

## 2023-08-14 DIAGNOSIS — Z99.81 OXYGEN DEPENDENT: ICD-10-CM

## 2023-08-14 DIAGNOSIS — I50.22 CHRONIC SYSTOLIC CONGESTIVE HEART FAILURE (HCC): ICD-10-CM

## 2023-08-14 PROCEDURE — 1123F ACP DISCUSS/DSCN MKR DOCD: CPT | Performed by: FAMILY MEDICINE

## 2023-08-14 PROCEDURE — G0439 PPPS, SUBSEQ VISIT: HCPCS | Performed by: FAMILY MEDICINE

## 2023-08-14 PROCEDURE — 3075F SYST BP GE 130 - 139MM HG: CPT | Performed by: FAMILY MEDICINE

## 2023-08-14 PROCEDURE — 3078F DIAST BP <80 MM HG: CPT | Performed by: FAMILY MEDICINE

## 2023-08-14 ASSESSMENT — LIFESTYLE VARIABLES
HOW OFTEN DO YOU HAVE A DRINK CONTAINING ALCOHOL: NEVER
HOW MANY STANDARD DRINKS CONTAINING ALCOHOL DO YOU HAVE ON A TYPICAL DAY: PATIENT DOES NOT DRINK

## 2023-08-14 ASSESSMENT — PATIENT HEALTH QUESTIONNAIRE - PHQ9
2. FEELING DOWN, DEPRESSED OR HOPELESS: 0
SUM OF ALL RESPONSES TO PHQ9 QUESTIONS 1 & 2: 0
SUM OF ALL RESPONSES TO PHQ QUESTIONS 1-9: 0
SUM OF ALL RESPONSES TO PHQ QUESTIONS 1-9: 0
1. LITTLE INTEREST OR PLEASURE IN DOING THINGS: 0
SUM OF ALL RESPONSES TO PHQ QUESTIONS 1-9: 0
SUM OF ALL RESPONSES TO PHQ QUESTIONS 1-9: 0

## 2023-08-15 ENCOUNTER — HOSPITAL ENCOUNTER (OUTPATIENT)
Age: 78
Setting detail: SPECIMEN
Discharge: HOME | End: 2023-08-15

## 2023-08-15 DIAGNOSIS — D50.0 IRON DEFICIENCY ANEMIA DUE TO CHRONIC BLOOD LOSS: ICD-10-CM

## 2023-08-15 DIAGNOSIS — E78.49 OTHER HYPERLIPIDEMIA: ICD-10-CM

## 2023-08-15 DIAGNOSIS — I50.22 CHRONIC SYSTOLIC CONGESTIVE HEART FAILURE (HCC): ICD-10-CM

## 2023-08-15 DIAGNOSIS — E10.65 UNCONTROLLED TYPE 1 DIABETES MELLITUS WITH HYPERGLYCEMIA (HCC): ICD-10-CM

## 2023-08-15 LAB
CHOLEST SERPL-MCNC: 153 MG/DL
CHOLESTEROL/HDL RATIO: 3.4
CREAT UR-MCNC: 120.1 MG/DL (ref 28–217)
ERYTHROCYTE [DISTWIDTH] IN BLOOD BY AUTOMATED COUNT: 17.9 % (ref 11.8–14.4)
HCT VFR BLD AUTO: 40.5 % (ref 36.3–47.1)
HDLC SERPL-MCNC: 45 MG/DL
HGB BLD-MCNC: 12.5 G/DL (ref 11.9–15.1)
LDLC SERPL CALC-MCNC: 61 MG/DL (ref 0–130)
MCH RBC QN AUTO: 24 PG (ref 25.2–33.5)
MCHC RBC AUTO-ENTMCNC: 30.9 G/DL (ref 28.4–34.8)
MCV RBC AUTO: 77.7 FL (ref 82.6–102.9)
MICROALBUMIN UR-MCNC: 1395 MG/L
MICROALBUMIN/CREAT UR-RTO: 1162 MCG/MG CREAT
NRBC BLD-RTO: 0 PER 100 WBC
PLATELET # BLD AUTO: 349 K/UL (ref 138–453)
PMV BLD AUTO: 12 FL (ref 8.1–13.5)
RBC # BLD AUTO: 5.21 M/UL (ref 3.95–5.11)
TRIGL SERPL-MCNC: 237 MG/DL
TSH SERPL DL<=0.05 MIU/L-ACNC: 1.11 UIU/ML (ref 0.3–5)
WBC OTHER # BLD: 16.8 K/UL (ref 3.5–11.3)

## 2023-08-16 ENCOUNTER — TELEPHONE (OUTPATIENT)
Dept: INTERNAL MEDICINE CLINIC | Age: 78
End: 2023-08-16

## 2023-08-16 DIAGNOSIS — E87.6 LOW BLOOD POTASSIUM: Primary | ICD-10-CM

## 2023-08-16 LAB
ALBUMIN SERPL-MCNC: 3.4 G/DL (ref 3.5–5.2)
ALBUMIN/GLOB SERPL: 0.9 {RATIO} (ref 1–2.5)
ALP SERPL-CCNC: 125 U/L (ref 35–104)
ALT SERPL-CCNC: 6 U/L (ref 5–33)
ANION GAP SERPL CALCULATED.3IONS-SCNC: 18 MMOL/L (ref 9–17)
AST SERPL-CCNC: 12 U/L
BILIRUB SERPL-MCNC: 0.2 MG/DL (ref 0.3–1.2)
BUN SERPL-MCNC: 16 MG/DL (ref 8–23)
CALCIUM SERPL-MCNC: 8.9 MG/DL (ref 8.6–10.4)
CHLORIDE SERPL-SCNC: 102 MMOL/L (ref 98–107)
CO2 SERPL-SCNC: 19 MMOL/L (ref 20–31)
CREAT SERPL-MCNC: 1.5 MG/DL (ref 0.5–0.9)
EST. AVERAGE GLUCOSE BLD GHB EST-MCNC: 289 MG/DL
GFR SERPL CREATININE-BSD FRML MDRD: 36 ML/MIN/1.73M2
GLUCOSE SERPL-MCNC: 107 MG/DL (ref 70–99)
HBA1C MFR BLD: 11.7 % (ref 4–6)
POTASSIUM SERPL-SCNC: 2.9 MMOL/L (ref 3.7–5.3)
PROT SERPL-MCNC: 7.3 G/DL (ref 6.4–8.3)
SODIUM SERPL-SCNC: 139 MMOL/L (ref 135–144)

## 2023-08-16 RX ORDER — POTASSIUM CHLORIDE 1500 MG/1
20 TABLET, EXTENDED RELEASE ORAL 2 TIMES DAILY
Qty: 180 TABLET | Refills: 1 | Status: SHIPPED | OUTPATIENT
Start: 2023-08-16

## 2023-08-16 NOTE — TELEPHONE ENCOUNTER
Patient has no potassium medication left. Daughter calling to get ASAP refill due to critically low lab results.  Medication being sent as 40 mEq p.o. daily

## 2023-08-16 NOTE — TELEPHONE ENCOUNTER
----- Message from Jesus Shah MD sent at 8/16/2023  8:06 AM EDT -----  Patient is advised to increase potassium chloride from 20 mEq to 40 mEq p.o. daily. Compliance is advised.   Recheck serum potassium level in 3 days

## 2023-08-21 ENCOUNTER — HOSPITAL ENCOUNTER (OUTPATIENT)
Age: 78
Setting detail: SPECIMEN
Discharge: HOME | End: 2023-08-21

## 2023-08-21 DIAGNOSIS — E87.6 LOW BLOOD POTASSIUM: ICD-10-CM

## 2023-08-21 LAB — POTASSIUM SERPL-SCNC: 4.3 MMOL/L (ref 3.7–5.3)

## 2023-08-24 NOTE — TELEPHONE ENCOUNTER
García Manning is calling to request a refill on the following medication(s):    Last Visit Date (If Applicable):  2/27/4625    Next Visit Date:    11/13/2023    Medication Request:  Requested Prescriptions     Pending Prescriptions Disp Refills    metFORMIN (GLUCOPHAGE-XR) 500 MG extended release tablet 180 tablet 0     Sig: TAKE ONE TABLET BY MOUTH TWICE DAILY @ 9AM-5PM

## 2023-08-25 RX ORDER — METFORMIN HYDROCHLORIDE 500 MG/1
TABLET, EXTENDED RELEASE ORAL
Qty: 180 TABLET | Refills: 0 | Status: SHIPPED | OUTPATIENT
Start: 2023-08-25

## 2023-09-20 RX ORDER — INSULIN LISPRO 100 [IU]/ML
INJECTION, SOLUTION INTRAVENOUS; SUBCUTANEOUS
Qty: 15 ML | Refills: 5 | Status: SHIPPED
Start: 2023-09-20 | End: 2023-09-22 | Stop reason: ALTCHOICE

## 2023-09-20 RX ORDER — INSULIN GLARGINE 100 [IU]/ML
INJECTION, SOLUTION SUBCUTANEOUS
Qty: 15 ML | Refills: 5 | Status: SHIPPED | OUTPATIENT
Start: 2023-09-20

## 2023-09-20 NOTE — TELEPHONE ENCOUNTER
Christel  is calling to request a refill on the following medication(s):    Medication Request:  Requested Prescriptions     Pending Prescriptions Disp Refills    HUMALOG KWIKPEN 100 UNIT/ML SOPN [Pharmacy Med Name: HUMALOG 100 UNIT/ML KWIKPEN] 15 mL      Sig: INJECT 2-16 UNITS UNDER THE SKIN 3 TIMES A DAY PER SLIDING SCALE    LANTUS SOLOSTAR 100 UNIT/ML injection pen [Pharmacy Med Name: LANTUS SOLOSTAR 100 UNIT/ML] 15 mL      Sig: INSTILL 38 UNITS UNDER THE SKIN IN THE MORNING AND BEFORE BEDTIME       Last Visit Date (If Applicable):  5/51/6196    Next Visit Date:    11/13/2023      Last refills 8/16/22. Prescriptions pending.

## 2023-09-21 RX ORDER — INSULIN GLARGINE 100 [IU]/ML
INJECTION, SOLUTION SUBCUTANEOUS
Qty: 15 ML | Refills: 2 | Status: SHIPPED | OUTPATIENT
Start: 2023-09-21

## 2023-09-21 NOTE — TELEPHONE ENCOUNTER
Last insulin refill was cancelled due to insurance not taking name brand insulin, needing generic insulin glargine sent to pharmacy       Medication pending for generic form

## 2023-09-22 RX ORDER — INSULIN ASPART 100 [IU]/ML
INJECTION, SOLUTION INTRAVENOUS; SUBCUTANEOUS
Qty: 5 ADJUSTABLE DOSE PRE-FILLED PEN SYRINGE | Refills: 3 | Status: SHIPPED | OUTPATIENT
Start: 2023-09-22

## 2023-09-28 ENCOUNTER — TELEPHONE (OUTPATIENT)
Dept: INTERNAL MEDICINE CLINIC | Age: 78
End: 2023-09-28

## 2023-09-28 DIAGNOSIS — J44.9 CHRONIC OBSTRUCTIVE PULMONARY DISEASE, UNSPECIFIED COPD TYPE (HCC): ICD-10-CM

## 2023-09-28 DIAGNOSIS — I50.20 SYSTOLIC HEART FAILURE, UNSPECIFIED HF CHRONICITY (HCC): Primary | ICD-10-CM

## 2023-09-28 NOTE — TELEPHONE ENCOUNTER
Patient daughter calling to ask for an  order for an O2 machine be sent to [address] states she is on 3 liters  and she is having trouble with her old one   Last seen     Corbin Brooks is calling to request a refill on the following medication(s):    Last Visit Date (If Applicable):  9/01/3815    Next Visit Date:    11/13/2023    Medication Request:  Requested Prescriptions      No prescriptions requested or ordered in this encounter

## 2023-10-06 ENCOUNTER — TELEPHONE (OUTPATIENT)
Dept: INTERNAL MEDICINE CLINIC | Age: 78
End: 2023-10-06

## 2023-10-06 DIAGNOSIS — J44.9 CHRONIC OBSTRUCTIVE PULMONARY DISEASE, UNSPECIFIED COPD TYPE (HCC): Primary | ICD-10-CM

## 2023-10-06 NOTE — TELEPHONE ENCOUNTER
Patient's daughter called back and stated she had one at [facility] but it was 2020 so she will need a new test. She would like an order for one of the qualifying test ( see last note)  to be sent to [address]. Please review and advise which test you would like ordered.

## 2023-10-06 NOTE — TELEPHONE ENCOUNTER
Patient's daughter called and stated [address] sent us a fax for a new oxygen machine but they have not received the response. I looked into this and called [address] and they stated they need a test to show oxygen level and needs. They need either a 6 minute walk test, room air @ rest test or overnight oxemitry testing. Per chart patient has none of the above. Henry Roberson will look into this as she thinks the patient had one and rehab and will call us back.

## 2023-10-09 NOTE — TELEPHONE ENCOUNTER
I called [address] and they stated they do not do oxygen testing and patient will have to do that in an office. She stated they will need to do oxygent testing and room air. I called Jonathon Hood no answer so I left a message requesting a return call.

## 2023-10-10 NOTE — TELEPHONE ENCOUNTER
Breonna Bustos called back and stated her mother has not seen a pulmonologist since 2020. I advised to see if she is still a patient and to see if they can do the oxygent test. If needed we could ask the doctor for an order or is she is no longer a patient we could ask for a referral. Breonna Bustos will call them and then call us back.

## 2023-10-12 NOTE — TELEPHONE ENCOUNTER
Called and spoke with Salma Donahue and informed her that we can send an order to [facility] infant monitoring. Salma Donahue would like that done so order printed and faxed to Clari Rubin was given the telephone number to call to schedule.

## 2023-10-23 RX ORDER — METOPROLOL SUCCINATE 50 MG/1
TABLET, EXTENDED RELEASE ORAL
Qty: 180 TABLET | Refills: 1 | Status: SHIPPED | OUTPATIENT
Start: 2023-10-23

## 2023-10-23 NOTE — TELEPHONE ENCOUNTER
Tao Gomez is calling to request a refill on the following medication(s):    Medication Request:  Requested Prescriptions     Pending Prescriptions Disp Refills    metoprolol succinate (TOPROL XL) 50 MG extended release tablet [Pharmacy Med Name: METOPROLOL SUCC ER 50 MG TAB] 180 tablet 0     Sig: TAKE ONE TABLET BY MOUTH TWICE A DAY AT 9 AM. AND 5 PM.       Last Visit Date (If Applicable):  7/09/1792    Next Visit Date:    11/13/2023      Last refill 4/20/23. Prescription pending.

## 2023-11-08 RX ORDER — SPIRONOLACTONE 25 MG/1
TABLET ORAL
Qty: 90 TABLET | Refills: 1 | Status: SHIPPED | OUTPATIENT
Start: 2023-11-08

## 2023-11-08 RX ORDER — LOSARTAN POTASSIUM 100 MG/1
TABLET ORAL
Qty: 90 TABLET | Refills: 1 | Status: SHIPPED | OUTPATIENT
Start: 2023-11-08

## 2023-11-08 NOTE — TELEPHONE ENCOUNTER
Chelo Yip is calling to request a refill on the following medication(s):    Medication Request:  Requested Prescriptions     Pending Prescriptions Disp Refills    spironolactone (ALDACTONE) 25 MG tablet [Pharmacy Med Name: SPIRONOLACTONE 25 MG TABLET] 90 tablet 1     Sig: TAKE 1 TABLET BY MOUTH DAILY AT 9 AM.    losartan (COZAAR) 100 MG tablet [Pharmacy Med Name: LOSARTAN POTASSIUM 100 MG TAB] 90 tablet 1     Sig: TAKE ONE TABLET BY MOUTH EVERY MORNING AT 9:00AM       Last Visit Date (If Applicable):  6/16/0487    Next Visit Date:    Visit date not found    Last refills 7/13/23. Prescriptions pending.

## 2023-11-27 RX ORDER — METFORMIN HYDROCHLORIDE 500 MG/1
TABLET, EXTENDED RELEASE ORAL
Qty: 180 TABLET | Refills: 0 | Status: SHIPPED | OUTPATIENT
Start: 2023-11-27

## 2023-11-27 NOTE — TELEPHONE ENCOUNTER
Lyly Dumas is calling to request a refill on the following medication(s):    Medication Request:  Requested Prescriptions     Pending Prescriptions Disp Refills    metFORMIN (GLUCOPHAGE-XR) 500 MG extended release tablet [Pharmacy Med Name: METFORMIN HCL  MG TABLET] 180 tablet 0     Sig: TAKE 1 TABLET BY MOUTH TWICE A DAY (9AM AND 5PM)       Last Visit Date (If Applicable):  2/31/8513    Next Visit Date:    Visit date not found        Last refill 8/25/23. Prescription pending.

## 2024-01-01 ENCOUNTER — APPOINTMENT (OUTPATIENT)
Dept: GENERAL RADIOLOGY | Age: 79
End: 2024-01-01
Payer: COMMERCIAL

## 2024-01-01 ENCOUNTER — HOSPITAL ENCOUNTER (INPATIENT)
Age: 79
LOS: 3 days | End: 2024-08-31
Attending: EMERGENCY MEDICINE
Payer: COMMERCIAL

## 2024-01-01 VITALS
TEMPERATURE: 98.3 F | DIASTOLIC BLOOD PRESSURE: 82 MMHG | SYSTOLIC BLOOD PRESSURE: 147 MMHG | HEIGHT: 67 IN | RESPIRATION RATE: 25 BRPM | HEART RATE: 66 BPM | OXYGEN SATURATION: 90 % | WEIGHT: 225 LBS | BODY MASS INDEX: 35.31 KG/M2

## 2024-01-01 DIAGNOSIS — J20.9 ACUTE BRONCHITIS, UNSPECIFIED ORGANISM: ICD-10-CM

## 2024-01-01 DIAGNOSIS — U07.1 COVID-19: Primary | ICD-10-CM

## 2024-01-01 DIAGNOSIS — R09.02 HYPOXEMIA: ICD-10-CM

## 2024-01-01 LAB
ALBUMIN SERPL-MCNC: 2.9 G/DL (ref 3.5–5.2)
ALBUMIN/GLOB SERPL: 0.9 {RATIO} (ref 1–2.5)
ALP SERPL-CCNC: 145 U/L (ref 35–104)
ALT SERPL-CCNC: 9 U/L (ref 5–33)
ANION GAP SERPL CALCULATED.3IONS-SCNC: 11 MMOL/L (ref 9–17)
ANION GAP SERPL CALCULATED.3IONS-SCNC: 11 MMOL/L (ref 9–17)
ANION GAP SERPL CALCULATED.3IONS-SCNC: 13 MMOL/L (ref 9–17)
AST SERPL-CCNC: 19 U/L
BASOPHILS # BLD: 0.07 K/UL (ref 0–0.2)
BASOPHILS NFR BLD: 1 % (ref 0–2)
BILIRUB SERPL-MCNC: 0.4 MG/DL (ref 0.3–1.2)
BNP SERPL-MCNC: 5823 PG/ML
BNP SERPL-MCNC: 7384 PG/ML
BUN SERPL-MCNC: 26 MG/DL (ref 8–23)
BUN SERPL-MCNC: 27 MG/DL (ref 8–23)
BUN SERPL-MCNC: 40 MG/DL (ref 8–23)
CALCIUM SERPL-MCNC: 7.8 MG/DL (ref 8.6–10.4)
CALCIUM SERPL-MCNC: 7.9 MG/DL (ref 8.6–10.4)
CALCIUM SERPL-MCNC: 8.4 MG/DL (ref 8.6–10.4)
CHLORIDE SERPL-SCNC: 103 MMOL/L (ref 98–107)
CHLORIDE SERPL-SCNC: 109 MMOL/L (ref 98–107)
CHLORIDE SERPL-SCNC: 112 MMOL/L (ref 98–107)
CO2 SERPL-SCNC: 20 MMOL/L (ref 20–31)
CO2 SERPL-SCNC: 20 MMOL/L (ref 20–31)
CO2 SERPL-SCNC: 22 MMOL/L (ref 20–31)
CREAT SERPL-MCNC: 1.5 MG/DL (ref 0.5–0.9)
CREAT SERPL-MCNC: 1.6 MG/DL (ref 0.5–0.9)
CREAT SERPL-MCNC: 1.6 MG/DL (ref 0.5–0.9)
CRP SERPL HS-MCNC: 19.5 MG/L (ref 0–5)
D DIMER PPP FEU-MCNC: 0.78 UG/ML FEU
D DIMER PPP FEU-MCNC: 1.01 UG/ML FEU
EKG ATRIAL RATE: 80 BPM
EKG P AXIS: 69 DEGREES
EKG P-R INTERVAL: 152 MS
EKG Q-T INTERVAL: 344 MS
EKG QRS DURATION: 74 MS
EKG QTC CALCULATION (BAZETT): 396 MS
EKG R AXIS: 73 DEGREES
EKG T AXIS: -72 DEGREES
EKG VENTRICULAR RATE: 80 BPM
EOSINOPHIL # BLD: 0 K/UL (ref 0–0.4)
EOSINOPHILS RELATIVE PERCENT: 0 % (ref 1–4)
ERYTHROCYTE [DISTWIDTH] IN BLOOD BY AUTOMATED COUNT: 20.4 % (ref 12.5–15.4)
ERYTHROCYTE [DISTWIDTH] IN BLOOD BY AUTOMATED COUNT: 20.7 % (ref 12.5–15.4)
EST. AVERAGE GLUCOSE BLD GHB EST-MCNC: 206 MG/DL
FERRITIN SERPL-MCNC: 261 NG/ML (ref 13–150)
FIO2: 100
GFR, ESTIMATED: 33 ML/MIN/1.73M2
GFR, ESTIMATED: 33 ML/MIN/1.73M2
GFR, ESTIMATED: 35 ML/MIN/1.73M2
GLUCOSE BLD-MCNC: 153 MG/DL (ref 65–105)
GLUCOSE BLD-MCNC: 173 MG/DL (ref 65–105)
GLUCOSE BLD-MCNC: 174 MG/DL (ref 65–105)
GLUCOSE BLD-MCNC: 176 MG/DL (ref 65–105)
GLUCOSE BLD-MCNC: 246 MG/DL (ref 65–105)
GLUCOSE BLD-MCNC: 269 MG/DL (ref 65–105)
GLUCOSE BLD-MCNC: 295 MG/DL (ref 65–105)
GLUCOSE BLD-MCNC: 307 MG/DL (ref 65–105)
GLUCOSE BLD-MCNC: 341 MG/DL (ref 65–105)
GLUCOSE BLD-MCNC: 92 MG/DL (ref 65–105)
GLUCOSE SERPL-MCNC: 111 MG/DL (ref 70–99)
GLUCOSE SERPL-MCNC: 305 MG/DL (ref 70–99)
GLUCOSE SERPL-MCNC: 50 MG/DL (ref 70–99)
HBA1C MFR BLD: 8.8 % (ref 4–6)
HCO3 VENOUS: 24.2 MMOL/L (ref 22–29)
HCT VFR BLD AUTO: 36.8 % (ref 36–46)
HCT VFR BLD AUTO: 37.6 % (ref 36–46)
HGB BLD-MCNC: 11.6 G/DL (ref 12–16)
HGB BLD-MCNC: 11.7 G/DL (ref 12–16)
LDH SERPL-CCNC: 286 U/L (ref 135–214)
LYMPHOCYTES NFR BLD: 2.14 K/UL (ref 1–4.8)
LYMPHOCYTES RELATIVE PERCENT: 31 % (ref 24–44)
MAGNESIUM SERPL-MCNC: 2.4 MG/DL (ref 1.6–2.6)
MCH RBC QN AUTO: 23.9 PG (ref 26–34)
MCH RBC QN AUTO: 24.1 PG (ref 26–34)
MCHC RBC AUTO-ENTMCNC: 31.1 G/DL (ref 31–37)
MCHC RBC AUTO-ENTMCNC: 31.6 G/DL (ref 31–37)
MCV RBC AUTO: 76.2 FL (ref 80–100)
MCV RBC AUTO: 77 FL (ref 80–100)
MODE: NORMAL
MONOCYTES NFR BLD: 0.62 K/UL (ref 0.1–1.2)
MONOCYTES NFR BLD: 9 % (ref 2–11)
MORPHOLOGY: ABNORMAL
NEGATIVE BASE EXCESS, VEN: 1.9 MMOL/L (ref 0–2)
NEUTROPHILS NFR BLD: 59 % (ref 36–66)
NEUTS SEG NFR BLD: 4.07 K/UL (ref 1.8–7.7)
O2 SAT, VEN: 60 % (ref 60–85)
PCO2 VENOUS: 45.2 MM HG (ref 41–51)
PH VENOUS: 7.34 (ref 7.32–7.43)
PLATELET # BLD AUTO: 150 K/UL (ref 140–450)
PLATELET # BLD AUTO: 276 K/UL (ref 140–450)
PMV BLD AUTO: 8.9 FL (ref 6–12)
PMV BLD AUTO: 9.1 FL (ref 6–12)
PO2 VENOUS: 33.4 MM HG (ref 30–50)
POTASSIUM SERPL-SCNC: 3.7 MMOL/L (ref 3.7–5.3)
POTASSIUM SERPL-SCNC: 4.1 MMOL/L (ref 3.7–5.3)
POTASSIUM SERPL-SCNC: 4.6 MMOL/L (ref 3.7–5.3)
PROCALCITONIN SERPL-MCNC: 0.13 NG/ML (ref 0–0.09)
PROT SERPL-MCNC: 6.2 G/DL (ref 6.4–8.3)
RBC # BLD AUTO: 4.83 M/UL (ref 4–5.2)
RBC # BLD AUTO: 4.88 M/UL (ref 4–5.2)
SARS-COV-2 RDRP RESP QL NAA+PROBE: DETECTED
SODIUM SERPL-SCNC: 136 MMOL/L (ref 135–144)
SODIUM SERPL-SCNC: 140 MMOL/L (ref 135–144)
SODIUM SERPL-SCNC: 145 MMOL/L (ref 135–144)
SPECIMEN DESCRIPTION: ABNORMAL
TROPONIN I SERPL HS-MCNC: 40 NG/L (ref 0–14)
TROPONIN I SERPL HS-MCNC: 44 NG/L (ref 0–14)
WBC OTHER # BLD: 5.1 K/UL (ref 3.5–11)
WBC OTHER # BLD: 6.9 K/UL (ref 3.5–11)

## 2024-01-01 PROCEDURE — 6360000002 HC RX W HCPCS

## 2024-01-01 PROCEDURE — 82947 ASSAY GLUCOSE BLOOD QUANT: CPT

## 2024-01-01 PROCEDURE — 6370000000 HC RX 637 (ALT 250 FOR IP): Performed by: INTERNAL MEDICINE

## 2024-01-01 PROCEDURE — 2060000000 HC ICU INTERMEDIATE R&B

## 2024-01-01 PROCEDURE — 85379 FIBRIN DEGRADATION QUANT: CPT

## 2024-01-01 PROCEDURE — 2700000000 HC OXYGEN THERAPY PER DAY

## 2024-01-01 PROCEDURE — 84484 ASSAY OF TROPONIN QUANT: CPT

## 2024-01-01 PROCEDURE — 94761 N-INVAS EAR/PLS OXIMETRY MLT: CPT

## 2024-01-01 PROCEDURE — 6370000000 HC RX 637 (ALT 250 FOR IP)

## 2024-01-01 PROCEDURE — 6360000002 HC RX W HCPCS: Performed by: INTERNAL MEDICINE

## 2024-01-01 PROCEDURE — 80048 BASIC METABOLIC PNL TOTAL CA: CPT

## 2024-01-01 PROCEDURE — 94640 AIRWAY INHALATION TREATMENT: CPT

## 2024-01-01 PROCEDURE — 94660 CPAP INITIATION&MGMT: CPT

## 2024-01-01 PROCEDURE — 2500000003 HC RX 250 WO HCPCS

## 2024-01-01 PROCEDURE — 6360000002 HC RX W HCPCS: Performed by: HOSPITALIST

## 2024-01-01 PROCEDURE — 85027 COMPLETE CBC AUTOMATED: CPT

## 2024-01-01 PROCEDURE — 71045 X-RAY EXAM CHEST 1 VIEW: CPT

## 2024-01-01 PROCEDURE — 97110 THERAPEUTIC EXERCISES: CPT

## 2024-01-01 PROCEDURE — 6370000000 HC RX 637 (ALT 250 FOR IP): Performed by: HOSPITALIST

## 2024-01-01 PROCEDURE — 80053 COMPREHEN METABOLIC PANEL: CPT

## 2024-01-01 PROCEDURE — 99291 CRITICAL CARE FIRST HOUR: CPT

## 2024-01-01 PROCEDURE — 99232 SBSQ HOSP IP/OBS MODERATE 35: CPT | Performed by: HOSPITALIST

## 2024-01-01 PROCEDURE — 97162 PT EVAL MOD COMPLEX 30 MIN: CPT

## 2024-01-01 PROCEDURE — 82803 BLOOD GASES ANY COMBINATION: CPT

## 2024-01-01 PROCEDURE — 83880 ASSAY OF NATRIURETIC PEPTIDE: CPT

## 2024-01-01 PROCEDURE — 83615 LACTATE (LD) (LDH) ENZYME: CPT

## 2024-01-01 PROCEDURE — 2580000003 HC RX 258

## 2024-01-01 PROCEDURE — 2580000003 HC RX 258: Performed by: EMERGENCY MEDICINE

## 2024-01-01 PROCEDURE — 36415 COLL VENOUS BLD VENIPUNCTURE: CPT

## 2024-01-01 PROCEDURE — 87635 SARS-COV-2 COVID-19 AMP PRB: CPT

## 2024-01-01 PROCEDURE — 97530 THERAPEUTIC ACTIVITIES: CPT

## 2024-01-01 PROCEDURE — 84145 PROCALCITONIN (PCT): CPT

## 2024-01-01 PROCEDURE — 83735 ASSAY OF MAGNESIUM: CPT

## 2024-01-01 PROCEDURE — 85025 COMPLETE CBC W/AUTO DIFF WBC: CPT

## 2024-01-01 PROCEDURE — 82728 ASSAY OF FERRITIN: CPT

## 2024-01-01 PROCEDURE — 99285 EMERGENCY DEPT VISIT HI MDM: CPT

## 2024-01-01 PROCEDURE — 93005 ELECTROCARDIOGRAM TRACING: CPT | Performed by: EMERGENCY MEDICINE

## 2024-01-01 PROCEDURE — 83036 HEMOGLOBIN GLYCOSYLATED A1C: CPT

## 2024-01-01 PROCEDURE — 97535 SELF CARE MNGMENT TRAINING: CPT

## 2024-01-01 PROCEDURE — 86140 C-REACTIVE PROTEIN: CPT

## 2024-01-01 PROCEDURE — 99222 1ST HOSP IP/OBS MODERATE 55: CPT

## 2024-01-01 PROCEDURE — 99223 1ST HOSP IP/OBS HIGH 75: CPT | Performed by: INTERNAL MEDICINE

## 2024-01-01 PROCEDURE — 6360000002 HC RX W HCPCS: Performed by: EMERGENCY MEDICINE

## 2024-01-01 RX ORDER — INSULIN GLARGINE 100 [IU]/ML
38 INJECTION, SOLUTION SUBCUTANEOUS 2 TIMES DAILY
Status: DISCONTINUED | OUTPATIENT
Start: 2024-01-01 | End: 2024-01-01 | Stop reason: HOSPADM

## 2024-01-01 RX ORDER — EPINEPHRINE 1 MG/ML
INJECTION, SOLUTION INTRAMUSCULAR; SUBCUTANEOUS
Status: COMPLETED
Start: 2024-01-01 | End: 2024-01-01

## 2024-01-01 RX ORDER — ALBUTEROL SULFATE 90 UG/1
2 AEROSOL, METERED RESPIRATORY (INHALATION) EVERY 4 HOURS PRN
Status: DISCONTINUED | OUTPATIENT
Start: 2024-01-01 | End: 2024-01-01 | Stop reason: HOSPADM

## 2024-01-01 RX ORDER — BENZONATATE 100 MG/1
200 CAPSULE ORAL 3 TIMES DAILY
Status: DISCONTINUED | OUTPATIENT
Start: 2024-01-01 | End: 2024-01-01 | Stop reason: HOSPADM

## 2024-01-01 RX ORDER — ALBUTEROL SULFATE 0.83 MG/ML
2.5 SOLUTION RESPIRATORY (INHALATION) EVERY 20 MIN
Status: COMPLETED | OUTPATIENT
Start: 2024-01-01 | End: 2024-01-01

## 2024-01-01 RX ORDER — ONDANSETRON 4 MG/1
4 TABLET, ORALLY DISINTEGRATING ORAL EVERY 8 HOURS PRN
Status: DISCONTINUED | OUTPATIENT
Start: 2024-01-01 | End: 2024-01-01 | Stop reason: HOSPADM

## 2024-01-01 RX ORDER — POLYETHYLENE GLYCOL 3350 17 G/17G
17 POWDER, FOR SOLUTION ORAL DAILY PRN
Status: DISCONTINUED | OUTPATIENT
Start: 2024-01-01 | End: 2024-01-01 | Stop reason: HOSPADM

## 2024-01-01 RX ORDER — ONDANSETRON 2 MG/ML
4 INJECTION INTRAMUSCULAR; INTRAVENOUS EVERY 6 HOURS PRN
Status: DISCONTINUED | OUTPATIENT
Start: 2024-01-01 | End: 2024-01-01 | Stop reason: HOSPADM

## 2024-01-01 RX ORDER — IPRATROPIUM BROMIDE AND ALBUTEROL SULFATE 2.5; .5 MG/3ML; MG/3ML
1 SOLUTION RESPIRATORY (INHALATION)
Status: DISCONTINUED | OUTPATIENT
Start: 2024-01-01 | End: 2024-01-01

## 2024-01-01 RX ORDER — CARVEDILOL 12.5 MG/1
12.5 TABLET ORAL 2 TIMES DAILY WITH MEALS
COMMUNITY

## 2024-01-01 RX ORDER — GLUCAGON 1 MG/ML
1 KIT INJECTION PRN
Status: DISCONTINUED | OUTPATIENT
Start: 2024-01-01 | End: 2024-01-01 | Stop reason: HOSPADM

## 2024-01-01 RX ORDER — DEXAMETHASONE SODIUM PHOSPHATE 10 MG/ML
6 INJECTION INTRAMUSCULAR; INTRAVENOUS EVERY 8 HOURS
Status: DISCONTINUED | OUTPATIENT
Start: 2024-01-01 | End: 2024-01-01

## 2024-01-01 RX ORDER — DEXAMETHASONE SODIUM PHOSPHATE 10 MG/ML
6 INJECTION INTRAMUSCULAR; INTRAVENOUS EVERY 12 HOURS
Status: DISCONTINUED | OUTPATIENT
Start: 2024-01-01 | End: 2024-01-01 | Stop reason: HOSPADM

## 2024-01-01 RX ORDER — INSULIN GLARGINE 100 [IU]/ML
38 INJECTION, SOLUTION SUBCUTANEOUS 2 TIMES DAILY
Status: DISCONTINUED | OUTPATIENT
Start: 2024-01-01 | End: 2024-01-01

## 2024-01-01 RX ORDER — ACETAMINOPHEN 325 MG/1
650 TABLET ORAL EVERY 6 HOURS PRN
Status: DISCONTINUED | OUTPATIENT
Start: 2024-01-01 | End: 2024-01-01 | Stop reason: HOSPADM

## 2024-01-01 RX ORDER — SODIUM CHLORIDE 9 MG/ML
INJECTION, SOLUTION INTRAVENOUS PRN
Status: DISCONTINUED | OUTPATIENT
Start: 2024-01-01 | End: 2024-01-01 | Stop reason: HOSPADM

## 2024-01-01 RX ORDER — INSULIN LISPRO 100 [IU]/ML
0-4 INJECTION, SOLUTION INTRAVENOUS; SUBCUTANEOUS NIGHTLY
Status: DISCONTINUED | OUTPATIENT
Start: 2024-01-01 | End: 2024-01-01 | Stop reason: HOSPADM

## 2024-01-01 RX ORDER — AMLODIPINE BESYLATE 10 MG/1
10 TABLET ORAL DAILY
COMMUNITY

## 2024-01-01 RX ORDER — EPINEPHRINE IN SOD CHLOR,ISO 1 MG/10 ML
1 SYRINGE (ML) INTRAVENOUS ONCE
Status: DISCONTINUED | OUTPATIENT
Start: 2024-01-01 | End: 2024-01-01

## 2024-01-01 RX ORDER — INSULIN LISPRO 100 [IU]/ML
0-8 INJECTION, SOLUTION INTRAVENOUS; SUBCUTANEOUS
Status: DISCONTINUED | OUTPATIENT
Start: 2024-01-01 | End: 2024-01-01

## 2024-01-01 RX ORDER — ALBUTEROL SULFATE 90 UG/1
2 AEROSOL, METERED RESPIRATORY (INHALATION)
Status: DISCONTINUED | OUTPATIENT
Start: 2024-01-01 | End: 2024-01-01 | Stop reason: HOSPADM

## 2024-01-01 RX ORDER — SODIUM CHLORIDE 9 MG/ML
INJECTION, SOLUTION INTRAVENOUS CONTINUOUS
Status: DISCONTINUED | OUTPATIENT
Start: 2024-01-01 | End: 2024-01-01

## 2024-01-01 RX ORDER — ALBUTEROL SULFATE 0.83 MG/ML
2.5 SOLUTION RESPIRATORY (INHALATION)
Status: DISCONTINUED | OUTPATIENT
Start: 2024-01-01 | End: 2024-01-01

## 2024-01-01 RX ORDER — DEXTROSE MONOHYDRATE 100 MG/ML
INJECTION, SOLUTION INTRAVENOUS CONTINUOUS PRN
Status: DISCONTINUED | OUTPATIENT
Start: 2024-01-01 | End: 2024-01-01 | Stop reason: HOSPADM

## 2024-01-01 RX ORDER — SPIRONOLACTONE 25 MG/1
25 TABLET ORAL DAILY
Status: DISCONTINUED | OUTPATIENT
Start: 2024-01-01 | End: 2024-01-01 | Stop reason: HOSPADM

## 2024-01-01 RX ORDER — ALBUTEROL SULFATE 0.83 MG/ML
2.5 SOLUTION RESPIRATORY (INHALATION) EVERY 4 HOURS PRN
Status: DISCONTINUED | OUTPATIENT
Start: 2024-01-01 | End: 2024-01-01

## 2024-01-01 RX ORDER — FUROSEMIDE 10 MG/ML
40 INJECTION INTRAMUSCULAR; INTRAVENOUS 2 TIMES DAILY
Status: DISCONTINUED | OUTPATIENT
Start: 2024-01-01 | End: 2024-01-01 | Stop reason: HOSPADM

## 2024-01-01 RX ORDER — ENOXAPARIN SODIUM 100 MG/ML
30 INJECTION SUBCUTANEOUS 2 TIMES DAILY
Status: DISCONTINUED | OUTPATIENT
Start: 2024-01-01 | End: 2024-01-01

## 2024-01-01 RX ORDER — ALBUTEROL SULFATE 90 UG/1
2 AEROSOL, METERED RESPIRATORY (INHALATION)
Status: DISCONTINUED | OUTPATIENT
Start: 2024-01-01 | End: 2024-01-01

## 2024-01-01 RX ORDER — FUROSEMIDE 20 MG
40 TABLET ORAL DAILY
Status: DISCONTINUED | OUTPATIENT
Start: 2024-01-01 | End: 2024-01-01

## 2024-01-01 RX ORDER — NOREPINEPHRINE BITARTRATE 0.06 MG/ML
INJECTION, SOLUTION INTRAVENOUS
Status: COMPLETED
Start: 2024-01-01 | End: 2024-01-01

## 2024-01-01 RX ORDER — DEXTROMETHORPHAN POLISTIREX 30 MG/5ML
60 SUSPENSION ORAL EVERY 12 HOURS SCHEDULED
Status: DISCONTINUED | OUTPATIENT
Start: 2024-01-01 | End: 2024-01-01 | Stop reason: HOSPADM

## 2024-01-01 RX ORDER — INSULIN LISPRO 100 [IU]/ML
0-4 INJECTION, SOLUTION INTRAVENOUS; SUBCUTANEOUS NIGHTLY
Status: DISCONTINUED | OUTPATIENT
Start: 2024-01-01 | End: 2024-01-01

## 2024-01-01 RX ORDER — DEXAMETHASONE SODIUM PHOSPHATE 10 MG/ML
6 INJECTION INTRAMUSCULAR; INTRAVENOUS DAILY
Status: DISCONTINUED | OUTPATIENT
Start: 2024-01-01 | End: 2024-01-01

## 2024-01-01 RX ORDER — IPRATROPIUM BROMIDE AND ALBUTEROL SULFATE 2.5; .5 MG/3ML; MG/3ML
1 SOLUTION RESPIRATORY (INHALATION) EVERY 4 HOURS PRN
Status: DISCONTINUED | OUTPATIENT
Start: 2024-01-01 | End: 2024-01-01

## 2024-01-01 RX ORDER — INSULIN GLARGINE 100 [IU]/ML
15 INJECTION, SOLUTION SUBCUTANEOUS 2 TIMES DAILY
Status: DISCONTINUED | OUTPATIENT
Start: 2024-01-01 | End: 2024-01-01

## 2024-01-01 RX ORDER — METOPROLOL SUCCINATE 50 MG/1
50 TABLET, EXTENDED RELEASE ORAL 2 TIMES DAILY
Status: DISCONTINUED | OUTPATIENT
Start: 2024-01-01 | End: 2024-01-01 | Stop reason: HOSPADM

## 2024-01-01 RX ORDER — 0.9 % SODIUM CHLORIDE 0.9 %
1000 INTRAVENOUS SOLUTION INTRAVENOUS ONCE
Status: COMPLETED | OUTPATIENT
Start: 2024-01-01 | End: 2024-01-01

## 2024-01-01 RX ORDER — ACETAMINOPHEN 650 MG/1
650 SUPPOSITORY RECTAL EVERY 6 HOURS PRN
Status: DISCONTINUED | OUTPATIENT
Start: 2024-01-01 | End: 2024-01-01 | Stop reason: HOSPADM

## 2024-01-01 RX ORDER — INSULIN LISPRO 100 [IU]/ML
0-16 INJECTION, SOLUTION INTRAVENOUS; SUBCUTANEOUS
Status: DISCONTINUED | OUTPATIENT
Start: 2024-01-01 | End: 2024-01-01 | Stop reason: HOSPADM

## 2024-01-01 RX ORDER — DEXTROSE MONOHYDRATE 25 G/50ML
25 INJECTION, SOLUTION INTRAVENOUS ONCE
Status: DISCONTINUED | OUTPATIENT
Start: 2024-01-01 | End: 2024-01-01

## 2024-01-01 RX ORDER — ENOXAPARIN SODIUM 100 MG/ML
30 INJECTION SUBCUTANEOUS 2 TIMES DAILY
Status: DISCONTINUED | OUTPATIENT
Start: 2024-01-01 | End: 2024-01-01 | Stop reason: HOSPADM

## 2024-01-01 RX ORDER — INSULIN GLARGINE 100 [IU]/ML
22 INJECTION, SOLUTION SUBCUTANEOUS 2 TIMES DAILY
Status: DISCONTINUED | OUTPATIENT
Start: 2024-01-01 | End: 2024-01-01

## 2024-01-01 RX ORDER — FUROSEMIDE 10 MG/ML
40 INJECTION INTRAMUSCULAR; INTRAVENOUS 2 TIMES DAILY
Status: DISCONTINUED | OUTPATIENT
Start: 2024-01-01 | End: 2024-01-01

## 2024-01-01 RX ORDER — SODIUM CHLORIDE 0.9 % (FLUSH) 0.9 %
5-40 SYRINGE (ML) INJECTION EVERY 12 HOURS SCHEDULED
Status: DISCONTINUED | OUTPATIENT
Start: 2024-01-01 | End: 2024-01-01 | Stop reason: HOSPADM

## 2024-01-01 RX ORDER — EPINEPHRINE 1 MG/ML
1 INJECTION, SOLUTION INTRAMUSCULAR; SUBCUTANEOUS ONCE
Status: COMPLETED | OUTPATIENT
Start: 2024-01-01 | End: 2024-01-01

## 2024-01-01 RX ORDER — SODIUM CHLORIDE 0.9 % (FLUSH) 0.9 %
5-40 SYRINGE (ML) INJECTION PRN
Status: DISCONTINUED | OUTPATIENT
Start: 2024-01-01 | End: 2024-01-01 | Stop reason: HOSPADM

## 2024-01-01 RX ADMIN — ENOXAPARIN SODIUM 30 MG: 100 INJECTION SUBCUTANEOUS at 10:23

## 2024-01-01 RX ADMIN — BENZONATATE 200 MG: 100 CAPSULE ORAL at 09:08

## 2024-01-01 RX ADMIN — EPINEPHRINE 1 MG: 1 INJECTION INTRAMUSCULAR; INTRAVENOUS; SUBCUTANEOUS at 01:00

## 2024-01-01 RX ADMIN — DEXTROSE MONOHYDRATE 250 ML: 100 INJECTION, SOLUTION INTRAVENOUS at 22:32

## 2024-01-01 RX ADMIN — INSULIN LISPRO 4 UNITS: 100 INJECTION, SOLUTION INTRAVENOUS; SUBCUTANEOUS at 21:25

## 2024-01-01 RX ADMIN — INSULIN GLARGINE 15 UNITS: 100 INJECTION, SOLUTION SUBCUTANEOUS at 21:25

## 2024-01-01 RX ADMIN — FUROSEMIDE 40 MG: 10 INJECTION, SOLUTION INTRAMUSCULAR; INTRAVENOUS at 03:27

## 2024-01-01 RX ADMIN — FUROSEMIDE 40 MG: 10 INJECTION, SOLUTION INTRAMUSCULAR; INTRAVENOUS at 21:02

## 2024-01-01 RX ADMIN — CEFTRIAXONE SODIUM 1000 MG: 1 INJECTION, POWDER, FOR SOLUTION INTRAMUSCULAR; INTRAVENOUS at 22:27

## 2024-01-01 RX ADMIN — TIOTROPIUM BROMIDE INHALATION SPRAY 2 PUFF: 3.12 SPRAY, METERED RESPIRATORY (INHALATION) at 08:10

## 2024-01-01 RX ADMIN — SODIUM CHLORIDE, PRESERVATIVE FREE 10 ML: 5 INJECTION INTRAVENOUS at 10:23

## 2024-01-01 RX ADMIN — ENOXAPARIN SODIUM 30 MG: 100 INJECTION SUBCUTANEOUS at 09:09

## 2024-01-01 RX ADMIN — Medication 10 MCG/MIN: at 00:45

## 2024-01-01 RX ADMIN — DEXAMETHASONE SODIUM PHOSPHATE 6 MG: 10 INJECTION INTRAMUSCULAR; INTRAVENOUS at 16:40

## 2024-01-01 RX ADMIN — DEXAMETHASONE SODIUM PHOSPHATE 6 MG: 10 INJECTION INTRAMUSCULAR; INTRAVENOUS at 09:09

## 2024-01-01 RX ADMIN — ALBUTEROL SULFATE 2 PUFF: 90 AEROSOL, METERED RESPIRATORY (INHALATION) at 08:10

## 2024-01-01 RX ADMIN — METOPROLOL SUCCINATE 50 MG: 50 TABLET, EXTENDED RELEASE ORAL at 10:23

## 2024-01-01 RX ADMIN — ALBUTEROL SULFATE 2.5 MG: 2.5 SOLUTION RESPIRATORY (INHALATION) at 20:09

## 2024-01-01 RX ADMIN — INSULIN LISPRO 4 UNITS: 100 INJECTION, SOLUTION INTRAVENOUS; SUBCUTANEOUS at 09:09

## 2024-01-01 RX ADMIN — EPINEPHRINE 1 MG: 1 INJECTION, SOLUTION INTRAMUSCULAR; SUBCUTANEOUS at 01:00

## 2024-01-01 RX ADMIN — IPRATROPIUM BROMIDE AND ALBUTEROL SULFATE 1 DOSE: .5; 2.5 SOLUTION RESPIRATORY (INHALATION) at 08:26

## 2024-01-01 RX ADMIN — TIOTROPIUM BROMIDE INHALATION SPRAY 2 PUFF: 3.12 SPRAY, METERED RESPIRATORY (INHALATION) at 08:26

## 2024-01-01 RX ADMIN — DEXAMETHASONE SODIUM PHOSPHATE 6 MG: 10 INJECTION INTRAMUSCULAR; INTRAVENOUS at 03:32

## 2024-01-01 RX ADMIN — IPRATROPIUM BROMIDE AND ALBUTEROL SULFATE 1 DOSE: .5; 2.5 SOLUTION RESPIRATORY (INHALATION) at 01:19

## 2024-01-01 RX ADMIN — SODIUM CHLORIDE, PRESERVATIVE FREE 10 ML: 5 INJECTION INTRAVENOUS at 21:41

## 2024-01-01 RX ADMIN — ENOXAPARIN SODIUM 30 MG: 100 INJECTION SUBCUTANEOUS at 21:41

## 2024-01-01 RX ADMIN — CEFTRIAXONE SODIUM 1000 MG: 1 INJECTION, POWDER, FOR SOLUTION INTRAMUSCULAR; INTRAVENOUS at 21:48

## 2024-01-01 RX ADMIN — ENOXAPARIN SODIUM 30 MG: 100 INJECTION SUBCUTANEOUS at 21:07

## 2024-01-01 RX ADMIN — CEFTRIAXONE SODIUM 1000 MG: 1 INJECTION, POWDER, FOR SOLUTION INTRAMUSCULAR; INTRAVENOUS at 21:13

## 2024-01-01 RX ADMIN — ENOXAPARIN SODIUM 30 MG: 100 INJECTION SUBCUTANEOUS at 02:56

## 2024-01-01 RX ADMIN — INSULIN GLARGINE 38 UNITS: 100 INJECTION, SOLUTION SUBCUTANEOUS at 21:41

## 2024-01-01 RX ADMIN — DEXAMETHASONE SODIUM PHOSPHATE 6 MG: 10 INJECTION INTRAMUSCULAR; INTRAVENOUS at 21:02

## 2024-01-01 RX ADMIN — IPRATROPIUM BROMIDE AND ALBUTEROL SULFATE 1 DOSE: .5; 2.5 SOLUTION RESPIRATORY (INHALATION) at 12:14

## 2024-01-01 RX ADMIN — BENZONATATE 200 MG: 100 CAPSULE ORAL at 21:07

## 2024-01-01 RX ADMIN — ALBUTEROL SULFATE 2 PUFF: 90 AEROSOL, METERED RESPIRATORY (INHALATION) at 19:48

## 2024-01-01 RX ADMIN — AZITHROMYCIN DIHYDRATE 500 MG: 500 INJECTION, POWDER, LYOPHILIZED, FOR SOLUTION INTRAVENOUS at 22:54

## 2024-01-01 RX ADMIN — SODIUM CHLORIDE, PRESERVATIVE FREE 10 ML: 5 INJECTION INTRAVENOUS at 09:09

## 2024-01-01 RX ADMIN — INSULIN GLARGINE 22 UNITS: 100 INJECTION, SOLUTION SUBCUTANEOUS at 09:09

## 2024-01-01 RX ADMIN — METOPROLOL SUCCINATE 50 MG: 50 TABLET, EXTENDED RELEASE ORAL at 16:40

## 2024-01-01 RX ADMIN — FUROSEMIDE 40 MG: 10 INJECTION, SOLUTION INTRAMUSCULAR; INTRAVENOUS at 16:40

## 2024-01-01 RX ADMIN — ALBUTEROL SULFATE 2 PUFF: 90 AEROSOL, METERED RESPIRATORY (INHALATION) at 19:52

## 2024-01-01 RX ADMIN — FUROSEMIDE 40 MG: 10 INJECTION, SOLUTION INTRAMUSCULAR; INTRAVENOUS at 09:09

## 2024-01-01 RX ADMIN — ALBUTEROL SULFATE 2.5 MG: 2.5 SOLUTION RESPIRATORY (INHALATION) at 16:20

## 2024-01-01 RX ADMIN — ALBUTEROL SULFATE 2 PUFF: 90 AEROSOL, METERED RESPIRATORY (INHALATION) at 14:19

## 2024-01-01 RX ADMIN — AZITHROMYCIN DIHYDRATE 500 MG: 500 INJECTION, POWDER, LYOPHILIZED, FOR SOLUTION INTRAVENOUS at 21:47

## 2024-01-01 RX ADMIN — ALBUTEROL SULFATE 2 PUFF: 90 AEROSOL, METERED RESPIRATORY (INHALATION) at 22:45

## 2024-01-01 RX ADMIN — INSULIN LISPRO 8 UNITS: 100 INJECTION, SOLUTION INTRAVENOUS; SUBCUTANEOUS at 16:40

## 2024-01-01 RX ADMIN — DEXAMETHASONE SODIUM PHOSPHATE 6 MG: 10 INJECTION INTRAMUSCULAR; INTRAVENOUS at 10:23

## 2024-01-01 RX ADMIN — ALBUTEROL SULFATE 2.5 MG: 2.5 SOLUTION RESPIRATORY (INHALATION) at 20:08

## 2024-01-01 RX ADMIN — FUROSEMIDE 40 MG: 10 INJECTION, SOLUTION INTRAMUSCULAR; INTRAVENOUS at 10:23

## 2024-01-01 RX ADMIN — INSULIN LISPRO 12 UNITS: 100 INJECTION, SOLUTION INTRAVENOUS; SUBCUTANEOUS at 12:54

## 2024-01-01 RX ADMIN — BENZONATATE 200 MG: 100 CAPSULE ORAL at 21:40

## 2024-01-01 RX ADMIN — METOPROLOL SUCCINATE 50 MG: 50 TABLET, EXTENDED RELEASE ORAL at 09:09

## 2024-01-01 RX ADMIN — IPRATROPIUM BROMIDE AND ALBUTEROL SULFATE 1 DOSE: .5; 2.5 SOLUTION RESPIRATORY (INHALATION) at 04:02

## 2024-01-01 RX ADMIN — AZITHROMYCIN DIHYDRATE 500 MG: 500 INJECTION, POWDER, LYOPHILIZED, FOR SOLUTION INTRAVENOUS at 23:10

## 2024-01-01 RX ADMIN — SODIUM CHLORIDE 1000 ML: 9 INJECTION, SOLUTION INTRAVENOUS at 20:39

## 2024-01-01 RX ADMIN — METOPROLOL SUCCINATE 50 MG: 50 TABLET, EXTENDED RELEASE ORAL at 18:28

## 2024-01-01 RX ADMIN — INSULIN LISPRO 2 UNITS: 100 INJECTION, SOLUTION INTRAVENOUS; SUBCUTANEOUS at 18:28

## 2024-01-01 ASSESSMENT — ENCOUNTER SYMPTOMS
ABDOMINAL PAIN: 0
CONSTIPATION: 0
SHORTNESS OF BREATH: 1
VOMITING: 0
SORE THROAT: 0
COUGH: 1
NAUSEA: 0
DIARRHEA: 0
WHEEZING: 1

## 2024-01-01 ASSESSMENT — PAIN - FUNCTIONAL ASSESSMENT: PAIN_FUNCTIONAL_ASSESSMENT: NONE - DENIES PAIN

## 2024-01-11 ENCOUNTER — TELEPHONE (OUTPATIENT)
Dept: INTERNAL MEDICINE CLINIC | Age: 79
End: 2024-01-11

## 2024-01-11 DIAGNOSIS — Z99.81 OXYGEN DEPENDENT: ICD-10-CM

## 2024-01-11 DIAGNOSIS — J44.9 CHRONIC OBSTRUCTIVE PULMONARY DISEASE, UNSPECIFIED COPD TYPE (HCC): Primary | ICD-10-CM

## 2024-01-11 NOTE — TELEPHONE ENCOUNTER
[facility] called states they need new order for oxygen and she will need o2 testing again can't use last one need more recent.    Ok to order

## 2024-01-11 NOTE — TELEPHONE ENCOUNTER
Order for testing faxed to [facility].  Patient dtr Cary notified, she will let me know when testing is done so I can send new order and results to [facility].

## 2024-01-17 ENCOUNTER — OFFICE VISIT (OUTPATIENT)
Dept: INTERNAL MEDICINE CLINIC | Age: 79
End: 2024-01-17
Payer: MEDICARE

## 2024-01-17 VITALS
HEIGHT: 67 IN | TEMPERATURE: 97 F | DIASTOLIC BLOOD PRESSURE: 92 MMHG | WEIGHT: 213 LBS | RESPIRATION RATE: 23 BRPM | OXYGEN SATURATION: 90 % | HEART RATE: 94 BPM | BODY MASS INDEX: 33.43 KG/M2 | SYSTOLIC BLOOD PRESSURE: 170 MMHG

## 2024-01-17 DIAGNOSIS — I50.22 CHRONIC SYSTOLIC CONGESTIVE HEART FAILURE (HCC): ICD-10-CM

## 2024-01-17 DIAGNOSIS — E10.21 TYPE 1 DIABETES MELLITUS WITH DIABETIC NEPHROPATHY (HCC): ICD-10-CM

## 2024-01-17 DIAGNOSIS — N18.30 STAGE 3 CHRONIC KIDNEY DISEASE, UNSPECIFIED WHETHER STAGE 3A OR 3B CKD (HCC): ICD-10-CM

## 2024-01-17 DIAGNOSIS — R42 VERTIGO: ICD-10-CM

## 2024-01-17 DIAGNOSIS — I87.2 EDEMA OF BOTH LOWER EXTREMITIES DUE TO PERIPHERAL VENOUS INSUFFICIENCY: ICD-10-CM

## 2024-01-17 DIAGNOSIS — E10.65 UNCONTROLLED TYPE 1 DIABETES MELLITUS WITH HYPERGLYCEMIA (HCC): Primary | ICD-10-CM

## 2024-01-17 DIAGNOSIS — Z99.81 OXYGEN DEPENDENT: ICD-10-CM

## 2024-01-17 DIAGNOSIS — J44.9 CHRONIC OBSTRUCTIVE PULMONARY DISEASE, UNSPECIFIED COPD TYPE (HCC): ICD-10-CM

## 2024-01-17 DIAGNOSIS — F17.200 SMOKER: ICD-10-CM

## 2024-01-17 DIAGNOSIS — Z28.21 PNEUMOCOCCAL VACCINATION DECLINED: ICD-10-CM

## 2024-01-17 DIAGNOSIS — Z28.21 COVID-19 VACCINATION DECLINED: ICD-10-CM

## 2024-01-17 DIAGNOSIS — E78.49 OTHER HYPERLIPIDEMIA: ICD-10-CM

## 2024-01-17 DIAGNOSIS — D50.0 IRON DEFICIENCY ANEMIA DUE TO CHRONIC BLOOD LOSS: ICD-10-CM

## 2024-01-17 DIAGNOSIS — Z28.21 INFLUENZA VACCINATION DECLINED: ICD-10-CM

## 2024-01-17 DIAGNOSIS — Z86.73 HISTORY OF CVA (CEREBROVASCULAR ACCIDENT): ICD-10-CM

## 2024-01-17 DIAGNOSIS — K02.9 DENTAL CARIES: ICD-10-CM

## 2024-01-17 DIAGNOSIS — I10 BENIGN ESSENTIAL HTN: ICD-10-CM

## 2024-01-17 PROCEDURE — 1123F ACP DISCUSS/DSCN MKR DOCD: CPT | Performed by: FAMILY MEDICINE

## 2024-01-17 PROCEDURE — G8399 PT W/DXA RESULTS DOCUMENT: HCPCS | Performed by: FAMILY MEDICINE

## 2024-01-17 PROCEDURE — 3080F DIAST BP >= 90 MM HG: CPT | Performed by: FAMILY MEDICINE

## 2024-01-17 PROCEDURE — G8484 FLU IMMUNIZE NO ADMIN: HCPCS | Performed by: FAMILY MEDICINE

## 2024-01-17 PROCEDURE — 4004F PT TOBACCO SCREEN RCVD TLK: CPT | Performed by: FAMILY MEDICINE

## 2024-01-17 PROCEDURE — 3077F SYST BP >= 140 MM HG: CPT | Performed by: FAMILY MEDICINE

## 2024-01-17 PROCEDURE — 3023F SPIROM DOC REV: CPT | Performed by: FAMILY MEDICINE

## 2024-01-17 PROCEDURE — 99214 OFFICE O/P EST MOD 30 MIN: CPT | Performed by: FAMILY MEDICINE

## 2024-01-17 PROCEDURE — G8427 DOCREV CUR MEDS BY ELIG CLIN: HCPCS | Performed by: FAMILY MEDICINE

## 2024-01-17 PROCEDURE — G8417 CALC BMI ABV UP PARAM F/U: HCPCS | Performed by: FAMILY MEDICINE

## 2024-01-17 PROCEDURE — 1090F PRES/ABSN URINE INCON ASSESS: CPT | Performed by: FAMILY MEDICINE

## 2024-01-17 SDOH — ECONOMIC STABILITY: FOOD INSECURITY: WITHIN THE PAST 12 MONTHS, THE FOOD YOU BOUGHT JUST DIDN'T LAST AND YOU DIDN'T HAVE MONEY TO GET MORE.: NEVER TRUE

## 2024-01-17 SDOH — ECONOMIC STABILITY: INCOME INSECURITY: HOW HARD IS IT FOR YOU TO PAY FOR THE VERY BASICS LIKE FOOD, HOUSING, MEDICAL CARE, AND HEATING?: NOT HARD AT ALL

## 2024-01-17 SDOH — ECONOMIC STABILITY: FOOD INSECURITY: WITHIN THE PAST 12 MONTHS, YOU WORRIED THAT YOUR FOOD WOULD RUN OUT BEFORE YOU GOT MONEY TO BUY MORE.: NEVER TRUE

## 2024-01-17 SDOH — ECONOMIC STABILITY: HOUSING INSECURITY
IN THE LAST 12 MONTHS, WAS THERE A TIME WHEN YOU DID NOT HAVE A STEADY PLACE TO SLEEP OR SLEPT IN A SHELTER (INCLUDING NOW)?: NO

## 2024-01-17 ASSESSMENT — ENCOUNTER SYMPTOMS
EYES NEGATIVE: 1
COUGH: 1
SHORTNESS OF BREATH: 1
ALLERGIC/IMMUNOLOGIC NEGATIVE: 1
BACK PAIN: 1
GASTROINTESTINAL NEGATIVE: 1

## 2024-01-17 ASSESSMENT — PATIENT HEALTH QUESTIONNAIRE - PHQ9
SUM OF ALL RESPONSES TO PHQ QUESTIONS 1-9: 0
SUM OF ALL RESPONSES TO PHQ9 QUESTIONS 1 & 2: 0
1. LITTLE INTEREST OR PLEASURE IN DOING THINGS: 0
SUM OF ALL RESPONSES TO PHQ QUESTIONS 1-9: 0
2. FEELING DOWN, DEPRESSED OR HOPELESS: 0

## 2024-01-17 ASSESSMENT — COPD QUESTIONNAIRES: COPD: 1

## 2024-01-17 NOTE — PROGRESS NOTES
Subjective:      Patient ID: Shanique Bender is a 78 y.o. female.    COPD  She complains of cough and shortness of breath. This is a chronic problem. The current episode started more than 1 month ago. The problem occurs every several days. The problem has been waxing and waning. The cough is non-productive. Associated symptoms include malaise/fatigue. Her symptoms are aggravated by emotional stress, change in weather, exposure to fumes, exposure to smoke and pollen. Her symptoms are alleviated by beta-agonist and leukotriene antagonist. She reports moderate improvement on treatment. Risk factors for lung disease include smoking/tobacco exposure. Her past medical history is significant for COPD and emphysema.       Review of Systems   Constitutional:  Positive for malaise/fatigue.   HENT: Negative.     Eyes: Negative.    Respiratory:  Positive for cough and shortness of breath.    Cardiovascular: Negative.    Gastrointestinal: Negative.    Endocrine: Negative.    Musculoskeletal:  Positive for arthralgias and back pain.   Skin: Negative.    Allergic/Immunologic: Negative.    Neurological: Negative.    Hematological: Negative.    Psychiatric/Behavioral:  The patient is nervous/anxious.    Past family and social history unremarkable.   Diagnosis Orders   1. Uncontrolled type 1 diabetes mellitus with hyperglycemia (HCC)        2. Chronic obstructive pulmonary disease, unspecified COPD type (HCC)        3. Chronic systolic congestive heart failure (HCC)        4. 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 Stage 3 chronic kidney disease, unspecified whether stage 3a or 3b CKD (HCC)        6. Vertigo        7. Iron deficiency anemia due to chronic blood loss        8. Other hyperlipidemia        9. Influenza vaccination declined        10. Pneumococcal vaccination declined        11. Smoker        12. Edema of both lower extremities due to peripheral venous insufficiency        13. Oxygen dependent

## 2024-01-19 ENCOUNTER — TELEPHONE (OUTPATIENT)
Dept: INTERNAL MEDICINE CLINIC | Age: 79
End: 2024-01-19

## 2024-01-19 NOTE — TELEPHONE ENCOUNTER
Patient was here for oxygen evaluation. [facility] needs note addendum stating test was review and oxygen will improve condition.

## 2024-02-12 RX ORDER — FUROSEMIDE 40 MG/1
40 TABLET ORAL DAILY
Qty: 90 TABLET | Refills: 1 | Status: SHIPPED | OUTPATIENT
Start: 2024-02-12

## 2024-02-12 NOTE — TELEPHONE ENCOUNTER
Shanique Bender is calling to request a refill on the following medication(s):    Medication Request:  Requested Prescriptions     Pending Prescriptions Disp Refills    furosemide (LASIX) 40 MG tablet [Pharmacy Med Name: FUROSEMIDE 40 MG TABLET] 60 tablet      Sig: TAKE 1 TABLET BY MOUTH DAILY       Last Visit Date (If Applicable):  1/17/2024    Next Visit Date:    Visit date not found        Last refill 7/13/23. Prescription pending.

## 2024-04-30 RX ORDER — METOPROLOL SUCCINATE 50 MG/1
TABLET, EXTENDED RELEASE ORAL
Qty: 180 TABLET | Refills: 1 | Status: SHIPPED | OUTPATIENT
Start: 2024-04-30

## 2024-04-30 NOTE — TELEPHONE ENCOUNTER
Shanique Bender is calling to request a refill on the following medication(s):    Medication Request:  Requested Prescriptions     Pending Prescriptions Disp Refills    metoprolol succinate (TOPROL XL) 50 MG extended release tablet 180 tablet 1     Sig: TAKE ONE TABLET BY MOUTH TWICE A DAY AT 9 AM. AND 5 PM.       Last Visit Date (If Applicable):  1/17/2024    Next Visit Date:    Visit date not found

## 2024-05-22 RX ORDER — SPIRONOLACTONE 25 MG/1
TABLET ORAL
Qty: 90 TABLET | Refills: 0 | Status: SHIPPED | OUTPATIENT
Start: 2024-05-22

## 2024-05-22 RX ORDER — LOSARTAN POTASSIUM 100 MG/1
TABLET ORAL
Qty: 90 TABLET | Refills: 0 | Status: SHIPPED | OUTPATIENT
Start: 2024-05-22

## 2024-05-22 NOTE — TELEPHONE ENCOUNTER
Shanique Bender is calling to request a refill on the following medication(s):    Medication Request:  Requested Prescriptions     Pending Prescriptions Disp Refills    losartan (COZAAR) 100 MG tablet [Pharmacy Med Name: LOSARTAN POTASSIUM 100 MG TAB] 90 tablet 1     Sig: TAKE ONE TABLET BY MOUTH EVERY MORNING AT 9:00AM    spironolactone (ALDACTONE) 25 MG tablet [Pharmacy Med Name: SPIRONOLACTONE 25 MG TABLET] 90 tablet 1     Sig: TAKE 1 TABLET BY MOUTH DAILY AT 9 A.M.       Last Visit Date (If Applicable):  1/17/2024    Next Visit Date:    No future appointments scheduled    Last refill 11/8/23. Prescription pending.

## 2024-07-08 ENCOUNTER — OFFICE VISIT (OUTPATIENT)
Dept: FAMILY MEDICINE CLINIC | Age: 79
End: 2024-07-08
Payer: COMMERCIAL

## 2024-07-08 VITALS
OXYGEN SATURATION: 88 % | HEART RATE: 74 BPM | RESPIRATION RATE: 16 BRPM | DIASTOLIC BLOOD PRESSURE: 84 MMHG | SYSTOLIC BLOOD PRESSURE: 132 MMHG | WEIGHT: 224.2 LBS | TEMPERATURE: 97 F | HEIGHT: 67 IN | BODY MASS INDEX: 35.19 KG/M2

## 2024-07-08 DIAGNOSIS — Z28.21 PNEUMOCOCCAL VACCINATION DECLINED: ICD-10-CM

## 2024-07-08 DIAGNOSIS — N18.30 STAGE 3 CHRONIC KIDNEY DISEASE, UNSPECIFIED WHETHER STAGE 3A OR 3B CKD (HCC): ICD-10-CM

## 2024-07-08 DIAGNOSIS — I50.22 CHRONIC SYSTOLIC CONGESTIVE HEART FAILURE (HCC): ICD-10-CM

## 2024-07-08 DIAGNOSIS — K02.9 DENTAL CARIES: ICD-10-CM

## 2024-07-08 DIAGNOSIS — D50.0 IRON DEFICIENCY ANEMIA DUE TO CHRONIC BLOOD LOSS: ICD-10-CM

## 2024-07-08 DIAGNOSIS — I87.2 EDEMA OF BOTH LOWER EXTREMITIES DUE TO PERIPHERAL VENOUS INSUFFICIENCY: ICD-10-CM

## 2024-07-08 DIAGNOSIS — I10 BENIGN ESSENTIAL HTN: ICD-10-CM

## 2024-07-08 DIAGNOSIS — E78.2 MIXED HYPERLIPIDEMIA: ICD-10-CM

## 2024-07-08 DIAGNOSIS — I10 ESSENTIAL (PRIMARY) HYPERTENSION: ICD-10-CM

## 2024-07-08 DIAGNOSIS — Z28.21 INFLUENZA VACCINATION DECLINED: ICD-10-CM

## 2024-07-08 DIAGNOSIS — Z99.81 OXYGEN DEPENDENT: ICD-10-CM

## 2024-07-08 DIAGNOSIS — I48.20 CHRONIC ATRIAL FIBRILLATION (HCC): ICD-10-CM

## 2024-07-08 DIAGNOSIS — J44.9 CHRONIC OBSTRUCTIVE PULMONARY DISEASE, UNSPECIFIED COPD TYPE (HCC): ICD-10-CM

## 2024-07-08 DIAGNOSIS — R42 VERTIGO: ICD-10-CM

## 2024-07-08 DIAGNOSIS — F17.200 SMOKER: ICD-10-CM

## 2024-07-08 DIAGNOSIS — Z99.81 SUPPLEMENTAL OXYGEN DEPENDENT: ICD-10-CM

## 2024-07-08 DIAGNOSIS — I63.50 RIGHT PONTINE STROKE (HCC): ICD-10-CM

## 2024-07-08 DIAGNOSIS — Z86.73 HISTORY OF CVA (CEREBROVASCULAR ACCIDENT): ICD-10-CM

## 2024-07-08 DIAGNOSIS — E10.65 UNCONTROLLED TYPE 1 DIABETES MELLITUS WITH HYPERGLYCEMIA (HCC): Primary | ICD-10-CM

## 2024-07-08 DIAGNOSIS — E66.01 SEVERE OBESITY (BMI 35.0-39.9) WITH COMORBIDITY (HCC): ICD-10-CM

## 2024-07-08 DIAGNOSIS — E78.49 OTHER HYPERLIPIDEMIA: ICD-10-CM

## 2024-07-08 DIAGNOSIS — Z28.21 COVID-19 VACCINATION DECLINED: ICD-10-CM

## 2024-07-08 PROBLEM — J43.1 PANLOBULAR EMPHYSEMA (HCC): Status: ACTIVE | Noted: 2020-08-28

## 2024-07-08 PROBLEM — I63.219: Status: RESOLVED | Noted: 2023-01-30 | Resolved: 2024-07-08

## 2024-07-08 PROBLEM — J96.10 CHRONIC RESPIRATORY FAILURE (HCC): Status: RESOLVED | Noted: 2023-12-14 | Resolved: 2024-07-08

## 2024-07-08 PROBLEM — J43.1 PANLOBULAR EMPHYSEMA (HCC): Status: RESOLVED | Noted: 2020-08-28 | Resolved: 2024-07-08

## 2024-07-08 PROBLEM — J96.00 ACUTE RESPIRATORY FAILURE (HCC): Status: ACTIVE | Noted: 2023-01-30

## 2024-07-08 PROBLEM — L89.309: Status: ACTIVE | Noted: 2023-01-30

## 2024-07-08 PROBLEM — N17.9 ACUTE KIDNEY FAILURE (HCC): Status: ACTIVE | Noted: 2023-01-05

## 2024-07-08 PROBLEM — R50.9 FEVER, UNSPECIFIED: Status: ACTIVE | Noted: 2023-01-05

## 2024-07-08 PROBLEM — R26.9 UNSPECIFIED ABNORMALITIES OF GAIT AND MOBILITY: Status: RESOLVED | Noted: 2023-01-30 | Resolved: 2024-07-08

## 2024-07-08 PROBLEM — R52 PAIN, UNSPECIFIED: Status: RESOLVED | Noted: 2023-01-05 | Resolved: 2024-07-08

## 2024-07-08 PROBLEM — J96.10 CHRONIC RESPIRATORY FAILURE (HCC): Status: ACTIVE | Noted: 2023-12-14

## 2024-07-08 PROBLEM — E11.9 TYPE 2 DIABETES MELLITUS WITHOUT COMPLICATIONS (HCC): Status: RESOLVED | Noted: 2023-01-30 | Resolved: 2024-07-08

## 2024-07-08 PROBLEM — N18.32 CHRONIC KIDNEY DISEASE, STAGE 3B (HCC): Status: ACTIVE | Noted: 2023-11-27

## 2024-07-08 PROBLEM — S82.832D CLOSED FRACTURE OF DISTAL END OF LEFT FIBULA WITH ROUTINE HEALING: Status: ACTIVE | Noted: 2020-08-28

## 2024-07-08 PROBLEM — N17.9 ACUTE KIDNEY FAILURE (HCC): Status: RESOLVED | Noted: 2023-01-05 | Resolved: 2024-07-08

## 2024-07-08 PROBLEM — E46 PROTEIN-CALORIE MALNUTRITION (HCC): Status: RESOLVED | Noted: 2023-11-27 | Resolved: 2024-07-08

## 2024-07-08 PROBLEM — E11.8 DIABETES MELLITUS WITH COMPLICATION (HCC): Status: ACTIVE | Noted: 2023-10-31

## 2024-07-08 PROBLEM — L89.309: Status: RESOLVED | Noted: 2023-01-30 | Resolved: 2024-07-08

## 2024-07-08 PROBLEM — S82.832D CLOSED FRACTURE OF DISTAL END OF LEFT FIBULA WITH ROUTINE HEALING: Status: RESOLVED | Noted: 2020-08-28 | Resolved: 2024-07-08

## 2024-07-08 PROBLEM — I48.91 UNSPECIFIED ATRIAL FIBRILLATION (HCC): Status: ACTIVE | Noted: 2023-01-30

## 2024-07-08 PROBLEM — R26.81 UNSTEADY GAIT: Status: ACTIVE | Noted: 2021-01-18

## 2024-07-08 PROBLEM — N18.32 CHRONIC KIDNEY DISEASE, STAGE 3B (HCC): Status: RESOLVED | Noted: 2023-11-27 | Resolved: 2024-07-08

## 2024-07-08 PROBLEM — R50.9 FEVER, UNSPECIFIED: Status: RESOLVED | Noted: 2023-01-05 | Resolved: 2024-07-08

## 2024-07-08 PROBLEM — R26.9 UNSPECIFIED ABNORMALITIES OF GAIT AND MOBILITY: Status: ACTIVE | Noted: 2023-01-30

## 2024-07-08 PROBLEM — N39.0 URINARY TRACT INFECTION, SITE NOT SPECIFIED: Status: ACTIVE | Noted: 2023-01-30

## 2024-07-08 PROBLEM — G93.40 ENCEPHALOPATHY, UNSPECIFIED: Status: RESOLVED | Noted: 2023-01-30 | Resolved: 2024-07-08

## 2024-07-08 PROBLEM — E78.5 HYPERLIPIDEMIA, UNSPECIFIED: Status: ACTIVE | Noted: 2023-01-30

## 2024-07-08 PROBLEM — E46 PROTEIN-CALORIE MALNUTRITION (HCC): Status: ACTIVE | Noted: 2023-11-27

## 2024-07-08 PROBLEM — R55 SYNCOPE: Status: RESOLVED | Noted: 2020-08-28 | Resolved: 2024-07-08

## 2024-07-08 PROBLEM — G93.40 ENCEPHALOPATHY, UNSPECIFIED: Status: ACTIVE | Noted: 2023-01-30

## 2024-07-08 PROBLEM — I48.91 UNSPECIFIED ATRIAL FIBRILLATION (HCC): Status: RESOLVED | Noted: 2023-01-30 | Resolved: 2024-07-08

## 2024-07-08 PROBLEM — J96.00 ACUTE RESPIRATORY FAILURE (HCC): Status: RESOLVED | Noted: 2023-01-30 | Resolved: 2024-07-08

## 2024-07-08 PROBLEM — R55 SYNCOPE: Status: ACTIVE | Noted: 2020-08-28

## 2024-07-08 PROBLEM — N39.0 URINARY TRACT INFECTION, SITE NOT SPECIFIED: Status: RESOLVED | Noted: 2023-01-30 | Resolved: 2024-07-08

## 2024-07-08 PROBLEM — E11.8 DIABETES MELLITUS WITH COMPLICATION (HCC): Status: RESOLVED | Noted: 2023-10-31 | Resolved: 2024-07-08

## 2024-07-08 PROBLEM — E11.9 TYPE 2 DIABETES MELLITUS WITHOUT COMPLICATIONS (HCC): Status: ACTIVE | Noted: 2023-01-30

## 2024-07-08 PROBLEM — I63.219: Status: ACTIVE | Noted: 2023-01-30

## 2024-07-08 PROBLEM — R52 PAIN, UNSPECIFIED: Status: ACTIVE | Noted: 2023-01-05

## 2024-07-08 PROBLEM — R26.81 UNSTEADY GAIT: Status: RESOLVED | Noted: 2021-01-18 | Resolved: 2024-07-08

## 2024-07-08 PROBLEM — E78.5 HYPERLIPIDEMIA, UNSPECIFIED: Status: RESOLVED | Noted: 2023-01-30 | Resolved: 2024-07-08

## 2024-07-08 PROCEDURE — 3079F DIAST BP 80-89 MM HG: CPT | Performed by: FAMILY MEDICINE

## 2024-07-08 PROCEDURE — 3075F SYST BP GE 130 - 139MM HG: CPT | Performed by: FAMILY MEDICINE

## 2024-07-08 PROCEDURE — 1123F ACP DISCUSS/DSCN MKR DOCD: CPT | Performed by: FAMILY MEDICINE

## 2024-07-08 PROCEDURE — 99214 OFFICE O/P EST MOD 30 MIN: CPT | Performed by: FAMILY MEDICINE

## 2024-07-08 RX ORDER — INSULIN GLARGINE 100 [IU]/ML
38 INJECTION, SOLUTION SUBCUTANEOUS 2 TIMES DAILY
COMMUNITY

## 2024-07-08 RX ORDER — ALBUTEROL SULFATE 90 UG/1
AEROSOL, METERED RESPIRATORY (INHALATION)
Qty: 8.5 G | Refills: 5 | Status: SHIPPED | OUTPATIENT
Start: 2024-07-08

## 2024-07-08 RX ORDER — INSULIN LISPRO 100 [IU]/ML
2 INJECTION, SOLUTION INTRAVENOUS; SUBCUTANEOUS
COMMUNITY

## 2024-07-08 RX ORDER — TIOTROPIUM BROMIDE 18 UG/1
CAPSULE ORAL; RESPIRATORY (INHALATION)
Qty: 90 CAPSULE | Refills: 1 | Status: SHIPPED | OUTPATIENT
Start: 2024-07-08

## 2024-07-08 ASSESSMENT — ENCOUNTER SYMPTOMS
GASTROINTESTINAL NEGATIVE: 1
ALLERGIC/IMMUNOLOGIC NEGATIVE: 1
EYES NEGATIVE: 1
RESPIRATORY NEGATIVE: 1

## 2024-07-08 ASSESSMENT — COPD QUESTIONNAIRES: COPD: 1

## 2024-07-08 NOTE — PROGRESS NOTES
Subjective:      Patient ID: Shanique Bender is a 78 y.o. female.    COPD  This is a chronic problem. The current episode started more than 1 month ago. The problem occurs every several days. The problem has been waxing and waning. The cough is productive of sputum. Her symptoms are aggravated by emotional stress, change in weather, exposure to fumes, exposure to smoke, pollen and strenuous activity. Her symptoms are alleviated by beta-agonist and steroid inhaler. She reports moderate improvement on treatment. Risk factors for lung disease include smoking/tobacco exposure. Her past medical history is significant for emphysema.       Review of Systems   Constitutional: Negative.    HENT: Negative.     Eyes: Negative.    Respiratory: Negative.     Cardiovascular: Negative.    Gastrointestinal: Negative.    Endocrine: Negative.    Musculoskeletal:  Positive for arthralgias and gait problem.   Skin: Negative.    Allergic/Immunologic: Negative.    Hematological: Negative.    Psychiatric/Behavioral:  Positive for behavioral problems.    Past family and social history unremarkable.   Diagnosis Orders   1. Uncontrolled type 1 diabetes mellitus with hyperglycemia (HCC)  tiotropium (SPIRIVA HANDIHALER) 18 MCG inhalation capsule    CBC    Comprehensive Metabolic Panel    Hemoglobin A1C    Lipid Panel    Microalbumin, Ur    TSH      2. Vertigo        3. Iron deficiency anemia due to chronic blood loss        4. Other hyperlipidemia  CBC    Comprehensive Metabolic Panel    Hemoglobin A1C    Lipid Panel    Microalbumin, Ur    TSH      5. Chronic obstructive pulmonary disease, unspecified COPD type (HCC)        6. Influenza vaccination declined        7. Pneumococcal vaccination declined        8. Smoker        9. Edema of both lower extremities due to peripheral venous insufficiency        10. Oxygen dependent        11. Chronic systolic congestive heart failure (HCC)        12. COVID-19 vaccination declined        13. Benign

## 2024-07-20 ENCOUNTER — APPOINTMENT (OUTPATIENT)
Dept: GENERAL RADIOLOGY | Age: 79
End: 2024-07-20
Payer: COMMERCIAL

## 2024-07-20 ENCOUNTER — HOSPITAL ENCOUNTER (EMERGENCY)
Age: 79
Discharge: HOME | End: 2024-07-20
Attending: EMERGENCY MEDICINE
Payer: COMMERCIAL

## 2024-07-20 VITALS
DIASTOLIC BLOOD PRESSURE: 77 MMHG | TEMPERATURE: 97.5 F | HEART RATE: 50 BPM | BODY MASS INDEX: 33.51 KG/M2 | SYSTOLIC BLOOD PRESSURE: 158 MMHG | WEIGHT: 214 LBS | RESPIRATION RATE: 18 BRPM | OXYGEN SATURATION: 90 %

## 2024-07-20 DIAGNOSIS — N39.0 URINARY TRACT INFECTION WITHOUT HEMATURIA, SITE UNSPECIFIED: Primary | ICD-10-CM

## 2024-07-20 LAB
ALBUMIN SERPL-MCNC: 3.2 G/DL (ref 3.5–5.2)
ALBUMIN/GLOB SERPL: 0.7 {RATIO} (ref 1–2.5)
ALP SERPL-CCNC: 113 U/L (ref 35–104)
ALT SERPL-CCNC: 14 U/L (ref 5–33)
ANION GAP SERPL CALCULATED.3IONS-SCNC: 11 MMOL/L (ref 9–17)
AST SERPL-CCNC: 28 U/L
BACTERIA URNS QL MICRO: ABNORMAL
BASOPHILS # BLD: 0 K/UL (ref 0–0.2)
BASOPHILS NFR BLD: 0 % (ref 0–2)
BILIRUB SERPL-MCNC: 0.4 MG/DL (ref 0.3–1.2)
BILIRUB UR QL STRIP: NEGATIVE
BUN SERPL-MCNC: 25 MG/DL (ref 8–23)
CALCIUM SERPL-MCNC: 8.8 MG/DL (ref 8.6–10.4)
CHARACTER UR: ABNORMAL
CHLORIDE SERPL-SCNC: 104 MMOL/L (ref 98–107)
CLARITY UR: CLEAR
CO2 SERPL-SCNC: 26 MMOL/L (ref 20–31)
COLOR UR: YELLOW
CREAT SERPL-MCNC: 1.3 MG/DL (ref 0.5–0.9)
EKG ATRIAL RATE: 71 BPM
EKG P AXIS: 64 DEGREES
EKG P-R INTERVAL: 158 MS
EKG Q-T INTERVAL: 450 MS
EKG QRS DURATION: 84 MS
EKG QTC CALCULATION (BAZETT): 489 MS
EKG R AXIS: 52 DEGREES
EKG T AXIS: 180 DEGREES
EKG VENTRICULAR RATE: 71 BPM
EOSINOPHIL # BLD: 0 K/UL (ref 0–0.4)
EOSINOPHILS RELATIVE PERCENT: 0 % (ref 1–4)
EPI CELLS #/AREA URNS HPF: ABNORMAL /HPF (ref 0–5)
ERYTHROCYTE [DISTWIDTH] IN BLOOD BY AUTOMATED COUNT: 21.1 % (ref 12.5–15.4)
GFR, ESTIMATED: 42 ML/MIN/1.73M2
GLUCOSE BLD-MCNC: 122 MG/DL (ref 65–105)
GLUCOSE SERPL-MCNC: 256 MG/DL (ref 70–99)
GLUCOSE UR STRIP-MCNC: ABNORMAL MG/DL
HCT VFR BLD AUTO: 42.7 % (ref 36–46)
HGB BLD-MCNC: 13.6 G/DL (ref 12–16)
HGB UR QL STRIP.AUTO: ABNORMAL
KETONES UR STRIP-MCNC: NEGATIVE MG/DL
LEUKOCYTE ESTERASE UR QL STRIP: ABNORMAL
LIPASE SERPL-CCNC: 22 U/L (ref 13–60)
LYMPHOCYTES NFR BLD: 0.91 K/UL (ref 1–4.8)
LYMPHOCYTES RELATIVE PERCENT: 11 % (ref 24–44)
MAGNESIUM SERPL-MCNC: 2 MG/DL (ref 1.6–2.6)
MCH RBC QN AUTO: 23.7 PG (ref 26–34)
MCHC RBC AUTO-ENTMCNC: 31.9 G/DL (ref 31–37)
MCV RBC AUTO: 74.4 FL (ref 80–100)
MONOCYTES NFR BLD: 0.33 K/UL (ref 0.1–0.8)
MONOCYTES NFR BLD: 4 % (ref 1–7)
MORPHOLOGY: ABNORMAL
NEUTROPHILS NFR BLD: 85 % (ref 36–66)
NEUTS SEG NFR BLD: 7.06 K/UL (ref 1.8–7.7)
NITRITE UR QL STRIP: POSITIVE
PH UR STRIP: 7 [PH] (ref 5–8)
PLATELET # BLD AUTO: 235 K/UL (ref 140–450)
PMV BLD AUTO: 11.1 FL (ref 8–14)
POTASSIUM SERPL-SCNC: 4 MMOL/L (ref 3.7–5.3)
PROT SERPL-MCNC: 7.7 G/DL (ref 6.4–8.3)
PROT UR STRIP-MCNC: ABNORMAL MG/DL
RBC # BLD AUTO: 5.74 M/UL (ref 4–5.2)
RBC #/AREA URNS HPF: ABNORMAL /HPF (ref 0–2)
SODIUM SERPL-SCNC: 141 MMOL/L (ref 135–144)
SP GR UR STRIP: 1.02 (ref 1–1.03)
TROPONIN I SERPL HS-MCNC: 23 NG/L (ref 0–14)
TROPONIN I SERPL HS-MCNC: 23 NG/L (ref 0–14)
UROBILINOGEN UR STRIP-ACNC: NORMAL EU/DL (ref 0–1)
WBC #/AREA URNS HPF: ABNORMAL /HPF (ref 0–5)
WBC OTHER # BLD: 8.3 K/UL (ref 3.5–11)

## 2024-07-20 PROCEDURE — 85025 COMPLETE CBC W/AUTO DIFF WBC: CPT

## 2024-07-20 PROCEDURE — 80053 COMPREHEN METABOLIC PANEL: CPT

## 2024-07-20 PROCEDURE — 96375 TX/PRO/DX INJ NEW DRUG ADDON: CPT

## 2024-07-20 PROCEDURE — 81001 URINALYSIS AUTO W/SCOPE: CPT

## 2024-07-20 PROCEDURE — 83735 ASSAY OF MAGNESIUM: CPT

## 2024-07-20 PROCEDURE — 83690 ASSAY OF LIPASE: CPT

## 2024-07-20 PROCEDURE — 84484 ASSAY OF TROPONIN QUANT: CPT

## 2024-07-20 PROCEDURE — 82947 ASSAY GLUCOSE BLOOD QUANT: CPT

## 2024-07-20 PROCEDURE — 93005 ELECTROCARDIOGRAM TRACING: CPT | Performed by: EMERGENCY MEDICINE

## 2024-07-20 PROCEDURE — 36415 COLL VENOUS BLD VENIPUNCTURE: CPT

## 2024-07-20 PROCEDURE — 96365 THER/PROPH/DIAG IV INF INIT: CPT

## 2024-07-20 PROCEDURE — 99285 EMERGENCY DEPT VISIT HI MDM: CPT

## 2024-07-20 PROCEDURE — 96366 THER/PROPH/DIAG IV INF ADDON: CPT

## 2024-07-20 PROCEDURE — 6360000002 HC RX W HCPCS: Performed by: EMERGENCY MEDICINE

## 2024-07-20 PROCEDURE — 71045 X-RAY EXAM CHEST 1 VIEW: CPT

## 2024-07-20 RX ORDER — LEVOFLOXACIN 500 MG/1
500 TABLET, FILM COATED ORAL DAILY
Qty: 7 TABLET | Refills: 0 | Status: SHIPPED | OUTPATIENT
Start: 2024-07-20 | End: 2024-07-27

## 2024-07-20 RX ORDER — FUROSEMIDE 10 MG/ML
40 INJECTION INTRAMUSCULAR; INTRAVENOUS ONCE
Status: COMPLETED | OUTPATIENT
Start: 2024-07-20 | End: 2024-07-20

## 2024-07-20 RX ORDER — LEVOFLOXACIN 5 MG/ML
750 INJECTION, SOLUTION INTRAVENOUS ONCE
Status: COMPLETED | OUTPATIENT
Start: 2024-07-20 | End: 2024-07-20

## 2024-07-20 RX ADMIN — FUROSEMIDE 40 MG: 10 INJECTION, SOLUTION INTRAMUSCULAR; INTRAVENOUS at 10:44

## 2024-07-20 RX ADMIN — LEVOFLOXACIN 750 MG: 5 INJECTION, SOLUTION INTRAVENOUS at 10:48

## 2024-07-20 ASSESSMENT — PAIN - FUNCTIONAL ASSESSMENT
PAIN_FUNCTIONAL_ASSESSMENT: ACTIVITIES ARE NOT PREVENTED
PAIN_FUNCTIONAL_ASSESSMENT: NONE - DENIES PAIN
PAIN_FUNCTIONAL_ASSESSMENT: 0-10

## 2024-07-20 ASSESSMENT — ENCOUNTER SYMPTOMS
SORE THROAT: 0
VOMITING: 0
NAUSEA: 0
BACK PAIN: 0
EYE PAIN: 0
ABDOMINAL PAIN: 0
RHINORRHEA: 0
SHORTNESS OF BREATH: 0
COUGH: 0
DIARRHEA: 0

## 2024-07-20 ASSESSMENT — PAIN SCALES - GENERAL: PAINLEVEL_OUTOF10: 3

## 2024-07-20 NOTE — ED NOTES
Dmitriy Jarrett called for update, new ETA 15 minutes. Attempted several times to call [facility], no answer. Pt Alert, comfortable, waiting on transport.   Father picked up: prescription.   Identity was verified: Yes.

## 2024-07-20 NOTE — ED NOTES
Discharge instructions reviewed with patient and daughter Cary, Cary offered to drive patient home, however, due to pt on Home Oxygen, will call for ambulance transport. Dmitriy LLOYD 330p.

## 2024-07-20 NOTE — ED NOTES
Spoke with an RN @ the [facility]-pt is normally on 4-6 L of O2 via nc depending on pts [id number] writer will relay to Dr. Cr

## 2024-07-20 NOTE — ED NOTES
Pt brought by squad from [facility] d/t hypoglycemia  Pt was found unconscious per facility. Squad noted glucose of 44-admin PO glucose and it went up to 48- than D10 250ml bag was given over 15 min- glucose went up to 125. EJ was placed and 500 of sodium chloride

## 2024-07-20 NOTE — ED PROVIDER NOTES
[facility] Emergency Department  [address]  [place] [address]  Phone: [phone]  Fax: [phone]    EMERGENCY DEPARTMENT ENCOUNTER          Pt Name: Shanique Bendre  MRN: [id number]  Birthdate 1945  Date of evaluation: 7/20/2024      CHIEF COMPLAINT       Chief Complaint   Patient presents with    Hypoglycemia     Pt brought by squad from [facility]       HISTORY OF PRESENT ILLNESS       Shanique Bender is a 78 y.o. female who presents with altered mental status and hypoglycemia.  She lives at a long-term nursing care facility and at shift change she was not responding to staff and EMS was called.  EMS reports that they gave glucagon IM and brought the blood glucose level from the lower 40s to the upper 40s.  They then started an EJ and gave D10 IV which brought her glucose into the 120s and she woke up and is now at neurologic baseline.  Patient does answer my questions appropriately.  She reports no pain anywhere.  She is on insulin and Januvia.  No recent vomiting or infectious symptoms.  States she otherwise feels well.  Denies any other symptoms or concerns at this time.    REVIEW OF SYSTEMS       Review of Systems   Constitutional:  Negative for chills, fatigue and fever.   HENT:  Negative for rhinorrhea and sore throat.    Eyes:  Negative for pain.   Respiratory:  Negative for cough and shortness of breath.    Cardiovascular:  Negative for chest pain.   Gastrointestinal:  Negative for abdominal pain, diarrhea, nausea and vomiting.   Genitourinary:  Negative for difficulty urinating.   Musculoskeletal:  Negative for back pain and neck pain.   Skin:  Negative for rash.   Neurological:  Negative for weakness and headaches.        PAST MEDICAL HISTORY    has a past medical history of Anemia, Chronic atrial fibrillation (HCC), Chronic obstructive pulmonary disease (HCC), Chronic renal disease, stage III (HCC) [914761], Diabetes mellitus (HCC), GERD  was also discussed.  I have reviewed the disposition diagnosis with the patient and or their family/guardian.  I have answered their questions and given discharge instructions.  They voiced understanding of these instructions and did not have any further questions or complaints.      CONSULTS:    None    CRITICAL CARE:     None    PROCEDURES:    None    FINAL IMPRESSION      1. Urinary tract infection without hematuria, site unspecified          DISPOSITION/PLAN   DISPOSITION Decision To Discharge 07/20/2024 10:55:51 AM      Condition on Disposition    Improved    PATIENT REFERRED TO:  Karma Patrick MD  [address]  [address] [address]  [phone]    Schedule an appointment as soon as possible for a visit in 2 days        DISCHARGE MEDICATIONS:  New Prescriptions    LEVOFLOXACIN (LEVAQUIN) 500 MG TABLET    Take 1 tablet by mouth daily for 7 days       (Please note that portions of this note were completed with a voice recognition program.  Efforts were made to edit the dictations but occasionally words are mis-transcribed.)    Vaughn Cr DO  Attending Emergency Physician       Vaughn Cr DO  07/20/24 0160

## 2024-07-20 NOTE — DISCHARGE INSTRUCTIONS
PLEASE RETURN TO THE EMERGENCY DEPARTMENT IMMEDIATELY if your symptoms worsen in anyway or in 1-2 days if not improved for re-evaluation.  You should immediately return to the ER for symptoms such as abdominal or back pain, fever, a feeling of passing out, light headed, dizziness, chest pain, shortness of breath, persistent nausea and/or vomiting, numbness or weakness to the arms or legs, coolness or color change of the arms or legs.      Take your medication as indicated and prescribed.  If you are given an antibiotic then, make sure you get the prescription filled and take the antibiotics until finished.  You should encourage fluids.    Please understand that at this time there is no evidence for a more serious underlying process, but that early in the process of an illness or injury, an emergency department workup can be falsely reassuring.  You should contact your family doctor within the next 24 hours for a follow up appointment    THANK YOU!!!    From [facility] and [facility]    On behalf of the Emergency Department staff at [facility], I would like to thank you for giving us the opportunity to address your health care needs and concerns.    We hope that during your visit, our service was delivered in a professional and caring manner. Please keep [facility] in mind as we walk with you down the path to your own personal wellness.     Please expect an automated text message or email from us so we can ask a few questions about your health and progress. Based on your answers, a clinician may call you back to offer help and instructions.    Please understand that early in the process of an illness or injury, an emergency department workup can be falsely reassuring.  If you notice any worsening, changing or persistent symptoms please call your family doctor or return to the ER immediately.     Tell us how we did during your visit at [email or web address]   and let us know about

## 2024-08-28 PROBLEM — J96.01 ACUTE HYPOXIC RESPIRATORY FAILURE (HCC): Status: ACTIVE | Noted: 2024-01-01

## 2024-08-29 PROBLEM — N17.9 AKI (ACUTE KIDNEY INJURY) (HCC): Status: ACTIVE | Noted: 2024-01-01

## 2024-08-29 PROBLEM — I50.9 CHF (CONGESTIVE HEART FAILURE) (HCC): Status: ACTIVE | Noted: 2019-12-17

## 2024-08-29 PROBLEM — U07.1 COVID-19: Status: ACTIVE | Noted: 2024-01-01

## 2024-08-29 PROBLEM — E10.22 TYPE 1 DIABETES MELLITUS WITH STAGE 3 CHRONIC KIDNEY DISEASE (HCC): Status: ACTIVE | Noted: 2020-09-28

## 2024-08-29 PROBLEM — N18.30 TYPE 1 DIABETES MELLITUS WITH STAGE 3 CHRONIC KIDNEY DISEASE (HCC): Status: ACTIVE | Noted: 2020-09-28

## 2024-08-29 NOTE — CARE COORDINATION
CM attempted to see patient she is on BIPAP, will contact daughter     1300 CM spoke with daughter Cary, she is agreeable for patient to return to [facility]. Referral sent.

## 2024-08-29 NOTE — DISCHARGE INSTR - COC
Continuity of Care Form    Patient Name: Shanique Ruano   :  1945  MRN:  [id number]    Admit date:  2024  Discharge date:  ***    Code Status Order: DNR-CCA   Advance Directives:   Advance Care Flowsheet Documentation             Admitting Physician:  No admitting provider for patient encounter.  PCP: Karma Patrick MD    Discharging Nurse: ***  Discharging Hospital Unit/Room#: 341/341-01  Discharging Unit Phone Number: ***    Emergency Contact:   Extended Emergency Contact Information  Primary Emergency Contact: Cary Shook   [facility]  Home Phone: [phone]  Work Phone: [phone]  Mobile Phone: [phone]  Relation: Child  Secondary Emergency Contact: hayde ruano  Home Phone: [phone]  Mobile Phone: [phone]  Relation: Child    Past Surgical History:  Past Surgical History:   Procedure Laterality Date    COLONOSCOPY      HYSTERECTOMY (CERVIX STATUS UNKNOWN)      TONSILLECTOMY      TUBAL LIGATION      WISDOM TOOTH EXTRACTION         Immunization History:   Immunization History   Administered Date(s) Administered    COVID-19, J&J, (age 18y+), IM, 0.5 mL 2021    COVID-19, PFIZER GRAY top, DO NOT Dilute, (age 12 y+), IM, 30 mcg/0.3 mL 2022       Active Problems:  Patient Active Problem List   Diagnosis Code    Vertigo R42    Iron deficiency anemia due to chronic blood loss D50.0    Other hyperlipidemia E78.49    Chronic obstructive pulmonary disease (HCC) J44.9    Influenza vaccination declined Z28.21    Pneumococcal vaccination declined Z28.21    Smoker F17.200    Edema of both lower extremities due to peripheral venous insufficiency I87.2    Oxygen dependent Z99.81    CHF (congestive heart failure) ([facility]) I50.9    Type 1 diabetes mellitus with stage 3 chronic kidney disease (HCC) E10.22, N18.30    COVID-19 vaccination declined Z28.21    Benign essential HTN I10    Uncontrolled type 1 diabetes mellitus with hyperglycemia ([facility]) E10.65    History of CVA  (cerebrovascular accident) Z86.73    Chronic renal disease, stage III (HCC) [769658] N18.30    Dental caries K02.9    Right pontine stroke (HCC) I63.50    Chronic atrial fibrillation (HCC) I48.20    Essential (primary) hypertension I10    Supplemental oxygen dependent Z99.81    Acute hypoxic respiratory failure (HCC) J96.01    COVID-19 U07.1    GUSTABO (acute kidney injury) (HCC) N17.9       Isolation/Infection:   Isolation            Droplet Plus          Patient Infection Status       Infection Onset Added Last Indicated Last Indicated By Review Planned Expiration Resolved Resolved By    COVID-19 08/29/24 08/29/24 08/29/24 COVID-19, Rapid 09/08/24 09/12/24                         Nurse Assessment:  Last Vital Signs: BP (!) 150/81   Pulse 72   Temp 97.7 °F (36.5 °C) (Axillary)   Resp 12   Ht 1.702 m (5' 7\")   Wt 102.1 kg (225 lb)   LMP  (LMP Unknown)   SpO2 97%   BMI 35.24 kg/m²     Last documented pain score (0-10 scale):    Last Weight:   Wt Readings from Last 1 Encounters:   08/28/24 102.1 kg (225 lb)     Mental Status:  {IP PT MENTAL STATUS:20030}    IV Access:  { SONIA IV ACCESS:307307609}    Nursing Mobility/ADLs:  Walking   {CHP DME ADLs:738687484}  Transfer  {CHP DME ADLs:120787215}  Bathing  {CHP DME ADLs:935023361}  Dressing  {CHP DME ADLs:643535885}  Toileting  {CHP DME ADLs:068548672}  Feeding  {CHP DME ADLs:636006118}  Med Admin  {CHP DME ADLs:801318907}  Med Delivery   { SONIA MED Delivery:165260550}    Wound Care Documentation and Therapy:  Incision 08/27/20 Chest Left (Active)   Number of days: 1463        Elimination:  Continence:   Bowel: {YES / NO:19727}  Bladder: {YES / NO:19727}  Urinary Catheter: {Urinary Catheter:782521760}   Colostomy/Ileostomy/Ileal Conduit: {YES / NO:19727}       Date of Last BM: ***    Intake/Output Summary (Last 24 hours) at 8/29/2024 1534  Last data filed at 8/29/2024 0624  Gross per 24 hour   Intake 1300 ml   Output 400 ml   Net 900 ml     I/O last 3 completed  shifts:  In: 1300 [I.V.:250; IV Piggyback:1050]  Out: 400 [Urine:400]    Safety Concerns:     { SONIA Safety Concerns:054893539}    Impairments/Disabilities:      { SONIA Impairments/Disabilities:024843152}    Nutrition Therapy:  Current Nutrition Therapy:   { SONIA Diet List:468969829}    Routes of Feeding: {CHP DME Other Feedings:284578348}  Liquids: {Slp liquid thickness:02236}  Daily Fluid Restriction: {CHP DME Yes amt example:635406625}  Last Modified Barium Swallow with Video (Video Swallowing Test): {Done Not Done Date:}    Treatments at the Time of Hospital Discharge:   Respiratory Treatments: ***  Oxygen Therapy:  {Therapy; copd oxygen:02012}  Ventilator:    { CC Vent List:043212525}    Rehab Therapies: {THERAPEUTIC INTERVENTION:8895862700}  Weight Bearing Status/Restrictions: { CC Weight Bearin}  Other Medical Equipment (for information only, NOT a DME order):  {EQUIPMENT:541148545}  Other Treatments: ***    Patient's personal belongings (please select all that are sent with patient):  {[facility] DME Belongings:339711910}    RN SIGNATURE:  {Esignature:395472333}    CASE MANAGEMENT/SOCIAL WORK SECTION    Inpatient Status Date: 2024    Readmission Risk Assessment Score:  Readmission Risk              Risk of Unplanned Readmission:  18           Discharging to Facility/ Agency   Name: [facility]  Services: [place] [address]  [phone]     Dialysis Facility (if applicable)   Name:  Address:  Dialysis Schedule:  Phone:  Fax:    / signature: Electronically signed by Jennifer Espinoza RN on 24 at 3:34 PM EDT    PHYSICIAN SECTION    Prognosis: {Prognosis:3623052699}    Condition at Discharge: { Patient Condition:500317723}    Rehab Potential (if transferring to Rehab): {Prognosis:3838486476}    Recommended Labs or Other Treatments After Discharge: ***    Physician Certification: I certify the above information  and transfer of Shanique Bender  is necessary for the continuing treatment of the diagnosis listed and that she requires {Admit to Appropriate Level of Care:64599} for {GREATER/LESS:482237599} 30 days.     Update Admission H&P: {CHP DME Changes in HandP:828757719}    PHYSICIAN SIGNATURE:  {Esignature:128192387}

## 2024-08-29 NOTE — ED TRIAGE NOTES
Patient experiencing SOB and oxygen saturation was approx 80% on her home 3-5L of oxygen. Patient arrived on 15L simple mask. Oxygen saturation is a 90 percent. Hx COPD, CHF and DM. Blood sugar was elevated near 400 and patient was given 10 units around 6:30PM.

## 2024-08-29 NOTE — CARE COORDINATION
Case Management Assessment  Initial Evaluation    Date/Time of Evaluation: 8/29/2024 3:25 PM  Assessment Completed by: Jennifer Espinoza RN    If patient is discharged prior to next notation, then this note serves as note for discharge by case management.    Patient Name: Shanique Ruano                   YOB: 1945  Diagnosis: Hypoxemia [R09.02]  Acute bronchitis, unspecified organism [J20.9]  Acute hypoxic respiratory failure (HCC) [J96.01]  COVID-19 [U07.1]                   Date / Time: 8/28/2024  7:58 PM    Patient Admission Status: Inpatient   Readmission Risk (Low < 19, Mod (19-27), High > 27): Readmission Risk Score: 19.3    Current PCP: Karma Patrick MD  PCP verified by CM? Yes    Chart Reviewed: Yes      History Provided by: Patient  Patient Orientation: Alert and Oriented, Person, Place    Patient Cognition: Alert    Hospitalization in the last 30 days (Readmission):  No    If yes, Readmission Assessment in  Navigator will be completed.    Advance Directives:      Code Status: DNR-CCA   Patient's Primary Decision Maker is: Legal Next of Kin    Primary Decision Maker: Cary Shook - Child - [phone]    Primary Decision Maker: oscar ruanohattieирина - Child - [phone]    Discharge Planning:    Patient lives with: Other (Comment) ([facility]) Type of Home: Long-Term Care  Primary Care Giver: Self  Patient Support Systems include: Family Members, Children   Current Financial resources: Medicare, Medicaid  Current community resources: ECF/Home Care  Current services prior to admission: Durable Medical Equipment            Current DME: Wheelchair, Oxygen Therapy (Comment), Home Aerosol (Rollator)            Type of Home Care services:  None    ADLS  Prior functional level: Assistance with the following:, Bathing, Dressing, Cooking, Toileting, Housework  Current functional level: Bathing, Dressing, Toileting, Cooking, Housework (LTC)    PT AM-PAC:   /24  OT AM-PAC:   /24    Family can  provide assistance at DC: No  Would you like Case Management to discuss the discharge plan with any other family members/significant others, and if so, who? No  Plans to Return to Present Housing: Yes  Other Identified Issues/Barriers to RETURNING to current housing: none  Potential Assistance needed at discharge: Extended Care Facility            Potential DME:    Patient expects to discharge to: Long-term care  Plan for transportation at discharge: Wheelchair Van    Financial    Payor: LARISA ARAIZA [place] / [facility] [place] [facility] / Product Type: *No Product type* /     Does insurance require precert for SNF: Yes    Potential assistance Purchasing Medications: No  Meds-to-Beds request: No      [facility] - [place], [place] - [address] - P [phone] - F [phone]  [address] [facility] [address]  Phone: [phone] Fax: [phone]    [facility] [id number] - [place], [place] - [address] - P [phone] - F [phone]  [address] [facility] [address]  Phone: [phone] Fax: [phone]      Notes:    Factors facilitating achievement of predicted outcomes: Family support, Cooperative, Pleasant, and Has needed Durable Medical Equipment at home    Barriers to discharge: Decreased endurance, Lower extremity weakness, Long standing deficits, and Medical complications    Additional Case Management Notes: CM meet with daughter Cary, (POMONO) and patient both are agreeable to returning to [facility]. Patient is Long term care resident.  Referral sent    The Plan for Transition of Care is related to the following treatment goals of Hypoxemia [R09.02]  Acute bronchitis, unspecified organism [J20.9]  Acute hypoxic respiratory failure (HCC) [J96.01]  COVID-19 [U07.1]    IF APPLICABLE: The Patient and/or patient representative Shanique and her family were provided with a choice of provider and agrees with the discharge plan. Freedom of choice list with basic dialogue that supports the patient's  individualized plan of care/goals and shares the quality data associated with the providers was provided to:     Patient Representative Name:       The Patient and/or Patient Representative Agree with the Discharge Plan? yes    Jennifer Espinoza RN  Case Management Department  Ph:  Fax:

## 2024-08-29 NOTE — CONSULTS
getting medications?: Yes     Lack of Transportation (Non-Medical): Not on file   Physical Activity: Inactive (8/14/2023)    Exercise Vital Sign     Days of Exercise per Week: 0 days     Minutes of Exercise per Session: 0 min   Stress: Not on file   Social Connections: Socially Isolated (8/28/2020)    Received from [facility], [facility]    Social Connection and Isolation Panel [NHANES]     Frequency of Communication with Friends and Family: Never     Frequency of Social Gatherings with Friends and Family: Never     Attends Jewish Services: Never     Active Member of Clubs or Organizations: No     Attends Club or Organization Meetings: Never     Marital Status:    Intimate Partner Violence: Unknown (7/9/2024)    Received from [facility], [facility]    Humiliation, Afraid, Rape, and Kick questionnaire     Fear of Current or Ex-Partner: No     Emotionally Abused: Not on file     Physically Abused: Not on file     Sexually Abused: Not on file   Housing Stability: Low Risk  (8/29/2024)    Housing Stability Vital Sign     Unable to Pay for Housing in the Last Year: No     Number of Times Moved in the Last Year: 1     Homeless in the Last Year: No       FAMILY HISTORY:  Family History   Problem Relation Age of Onset    High Blood Pressure Mother     Cancer Mother     Diabetes Mother        REVIEW OF SYSTEMS:  All other systems reviewed and are negative.      PHYSICAL EXAM:  Vital Signs Blood pressure (!) 150/81, pulse 72, temperature 97.7 °F (36.5 °C), temperature source Axillary, resp. rate 12, height 1.702 m (5' 7\"), weight 102.1 kg (225 lb), SpO2 97%, not currently breastfeeding.  Oxygen Amount and Delivery: O2 Flow Rate (L/min): 50 L/min    Admission Weight Weight - Scale: 102.1 kg (225 lb)    General Appearance   Elderly female in no acute respiratory distress but is having paroxysms of cough  Head  Normocephalic, without obvious abnormality,  atraumatic    Eyes  conjunctivae/corneas clear. PERRL, EOM's intact.    ENT macroglossia, no thrush  Neck  no adenopathy, no carotid bruit, no JVD, supple, symmetrical, trachea midline and thyroid not enlarged, symmetric, no tenderness/mass/nodules  Lungs scattered wheezes with occasional crackles  Heart: regular rate and rhythm, S1, S2 normal, no murmur, click, rub or gallop  Abdomen  soft, non-tender; bowel sounds normal; no masses,  no organomegaly  Extremities  No peripheral edema  Skin  Skin color, texture, turgor normal. No rashes or lesions  Neurologic: Alert and oriented X 3, normal strength and tone.  Hard of hearing        Imaging    Chest x-ray shows interstitial infiltrates bilaterally as well as cardiomegaly      Lab Review  CBC     Lab Results   Component Value Date/Time    WBC 6.9 08/28/2024 08:11 PM    RBC 4.83 08/28/2024 08:11 PM    RBC 4.78 01/20/2012 04:28 PM    HGB 11.6 08/28/2024 08:11 PM    HCT 36.8 08/28/2024 08:11 PM     08/28/2024 08:11 PM     01/20/2012 04:28 PM    MCV 76.2 08/28/2024 08:11 PM    MCH 24.1 08/28/2024 08:11 PM    MCHC 31.6 08/28/2024 08:11 PM    RDW 20.4 08/28/2024 08:11 PM    LYMPHOPCT 31 08/28/2024 08:11 PM    MONOPCT 9 08/28/2024 08:11 PM    EOSPCT 0 08/28/2024 08:11 PM    BASOPCT 1 08/28/2024 08:11 PM    MONOSABS 0.62 08/28/2024 08:11 PM    LYMPHSABS 2.14 08/28/2024 08:11 PM    EOSABS 0.00 08/28/2024 08:11 PM    BASOSABS 0.07 08/28/2024 08:11 PM    DIFFTYPE NOT REPORTED 08/27/2020 06:37 AM       BMP   Lab Results   Component Value Date/Time     08/29/2024 12:58 AM    K 4.1 08/29/2024 12:58 AM     08/29/2024 12:58 AM    CO2 20 08/29/2024 12:58 AM    BUN 27 08/29/2024 12:58 AM    CREATININE 1.6 08/29/2024 12:58 AM    GLUCOSE 111 08/29/2024 12:58 AM    CALCIUM 7.9 08/29/2024 12:58 AM    MG 2.4 08/28/2024 09:05 PM       LFTS  Lab Results   Component Value Date/Time    ALKPHOS 145 08/28/2024 09:05 PM    ALT 9 08/28/2024 09:05 PM    AST 19 08/28/2024

## 2024-08-29 NOTE — H&P
[facility]  Office: [phone]  José Miguel Hebert DO, Jaime Interiano DO, Terrell Long DO, Bernabe Mcclain, DO, Anila Lindquist MD, Katie Walker MD, Philip Morrissey MD, Flakita Christianson MD,  Jakub Hoffman MD, Veronica Ireland MD, Steven Camacho MD,  Aundrea Mosqueda DO, Tish Baker MD, Nicolás Sanchez MD, Hasmukh Hebert DO, Barbara Casper MD,  Hao Rogers DO, aMrley Kuo MD, Huma Stone MD, Sarah Cole MD, Kuldip Aldridge MD,  Talon Gates MD, Roxana Sams MD, Fito Bermudez MD, Anat De La Garza MD, Simeon Mike MD, Liz Munoz MD, Vaughn Scott DO, Villa Suero DO, James Thakkar DO, Saniya Segura MD,  Patrick Shell MD, Shirley Waterhouse, CNP,  Zita Karimi, CNP, Vaughn Go, CNP,  Angelita Dinero, DNP, Cece Hawthorne, CNP, Hayley Brnuer, CNP, Hanna Lane, CNP, Joi Shay, CNP, Orly Chang, PA-C, Ruchi Trinidad, PA-C, Patricia Thompson, CNP, Giovanni Meza, CNP,  Cele Gtz, CNP, Preethi Hogan, CNP, Kindra Reeves, CNP, Sherry Gomez, CNS, Antonia Humphreys, CNP, Jackie Lester, CNP, Tracy Schwab, CNP         [facility]   IN-PATIENT SERVICE   [facility]    HISTORY AND PHYSICAL EXAMINATION            Date:   8/29/2024  Patient name:  Shanique Bender  Date of admission:  8/28/2024  7:58 PM  MRN:   [id number]  Account:  [id number]  YOB: 1945  PCP:    Karma Patrick MD  Room:   [address] [place] [address]  Code Status:    DNR-CCA    Chief Complaint:     Chief Complaint   Patient presents with    Shortness of Breath     History Obtained From:     patient, family member - daughter, electronic medical record    History of Present Illness:     Shanique Bender is a 78 y.o. Non- / non  female who presents with Shortness of Breath   and is admitted to the hospital for the management of Acute hypoxic respiratory failure (HCC).    Patient is a very pleasant 78-year-old female who presented from nursing home with acute hypoxic respiratory  importantly because of direct risk to patient if care not provided in a hospital setting.  Expected length of stay > 48 hours.    LISA Fuller  8/29/2024  2:10 AM    Copy sent to Karma Ramires MD

## 2024-08-29 NOTE — PLAN OF CARE
Problem: Respiratory - Adult  Goal: Achieves optimal ventilation and oxygenation  8/29/2024 1701 by Aaron Montana RN  Outcome: Progressing  Flowsheets (Taken 8/29/2024 1621)  Achieves optimal ventilation and oxygenation:   Assess for changes in respiratory status   Assess for changes in mentation and behavior   Position to facilitate oxygenation and minimize respiratory effort   Oxygen supplementation based on oxygen saturation or arterial blood gases   Initiate smoking cessation protocol as indicated   Encourage broncho-pulmonary hygiene including cough, deep breathe, incentive spirometry   Assess the need for suctioning and aspirate as needed   Assess and instruct to report shortness of breath or any respiratory difficulty   Respiratory therapy support as indicated     Problem: Safety - Adult  Goal: Free from fall injury  Outcome: Progressing     Problem: Discharge Planning  Goal: Discharge to home or other facility with appropriate resources  Outcome: Progressing  Flowsheets  Taken 8/29/2024 1621  Discharge to home or other facility with appropriate resources:   Identify barriers to discharge with patient and caregiver   Arrange for needed discharge resources and transportation as appropriate   Identify discharge learning needs (meds, wound care, etc)  Taken 8/29/2024 1215  Discharge to home or other facility with appropriate resources:   Identify barriers to discharge with patient and caregiver   Arrange for needed discharge resources and transportation as appropriate   Identify discharge learning needs (meds, wound care, etc)  Taken 8/29/2024 0800  Discharge to home or other facility with appropriate resources:   Identify barriers to discharge with patient and caregiver   Arrange for needed discharge resources and transportation as appropriate   Identify discharge learning needs (meds, wound care, etc)     Problem: Skin/Tissue Integrity  Goal: Absence of new skin breakdown  Description: 1.  Monitor for  care plan with appropriate goals if chronic or comorbid symptoms are exacerbated and prevent overall improvement and discharge

## 2024-08-29 NOTE — PLAN OF CARE
NON INVASIVE VENTILATION  PROVIDE OPTIMAL VENTILATION/ACCEPTABLE SP02  IMPLEMENT NON INVASIVE VENTILATION PROTOCOL  ASSESSMENT SKIN INTEGRITY  PATIENT EDUCATION AS NEEDED  BIPAP AS NEEDEDPROVIDE ADEQUATE OXYGENATION WITH ACCEPTABLE SP02/ABG'S    [x]  IDENTIFY APPROPRIATE OXYGEN THERAPY  [x]   MONITOR SP02/ABG'S AS NEEDED   [x]   PATIENT EDUCATION AS NEEDED      Pt started on NIV in ED BIPAP 12/6, Rate 12, 100%.

## 2024-08-29 NOTE — CARE COORDINATION
CM spoke with Kayla TABARES @ [facility] patient is LTC,  [facility]  [phone] [facility] bed[facility] status    1630 Rec'd call from [facility] @ [facility] patient is bedhold no precert needed.

## 2024-08-29 NOTE — PROGRESS NOTES
Physical Therapy  Facility/Department: [address]  Physical Therapy Initial Assessment    Name: Shanique Bender  : 1945  MRN: [id number]  Date of Service: 2024  Chief Complaint   Patient presents with    Shortness of Breath        Discharge Recommendations:  Patient would benefit from continued therapy after discharge   PT Equipment Recommendations  Equipment Needed: No      Patient Diagnosis(es): The primary encounter diagnosis was COVID-19. Diagnoses of Hypoxemia and Acute bronchitis, unspecified organism were also pertinent to this visit.  Past Medical History:  has a past medical history of Anemia, Chronic atrial fibrillation (HCC), Chronic obstructive pulmonary disease (HCC), Chronic renal disease, stage III (HCC) [862248], Diabetes mellitus (HCC), GERD (gastroesophageal reflux disease), Hypertension, Other hyperlipidemia, Right pontine stroke (HCC), and Systolic congestive heart failure (HCC).  Past Surgical History:  has a past surgical history that includes Hysterectomy; Colonoscopy; Tubal ligation; Tonsillectomy; and [place] tooth extraction.    Assessment  Body Structures, Functions, Activity Limitations Requiring Skilled Therapeutic Intervention: Decreased functional mobility ;Decreased balance;Decreased strength;Decreased endurance;Decreased safe awareness  Assessment: Pt presents with decreased strength and mobility with decreased activity tolerance due to admission for acute hypoxic respiratory failure and Covid. At baseline, pt resides in apartment with her daughter and has a flight of steps to enter; pt reports ambulating occasionally with a rollator but mostly without a device. Currently, pt is on heated HFNC and is limited with mobility at this time. Pt did perform bed mobility with SBA and transferred with RW with CGA. Will progress pt mobility as able with further discharge recommendations to follow. Pt will continue to benefit from acute care therapy during admission.  Treatment  Diagnosis: decreased mobility  Therapy Prognosis: Good  Decision Making: Medium Complexity  Requires PT Follow-Up: Yes  Activity Tolerance  Activity Tolerance: Patient limited by endurance;Patient limited by fatigue;Treatment limited secondary to medical complications  Activity Tolerance Comments: Pt on HFNC.    Plan  Physical Therapy Plan  General Plan:  (5-6x/week)  Current Treatment Recommendations: Strengthening, Balance training, Functional mobility training, Transfer training, Endurance training, Gait training, Stair training, Safety education & training, Patient/Caregiver education & training, Therapeutic activities  Safety Devices  Type of Devices: Gait belt, Call light within reach, Chair alarm in place, Left in chair, Nurse notified  Restraints  Restraints Initially in Place: No    Restrictions  Restrictions/Precautions  Restrictions/Precautions: General Precautions, Isolation  Required Braces or Orthoses?: No  Position Activity Restriction  Other position/activity restrictions: heated HFNC, Covid+     Subjective  General  Patient assessed for rehabilitation services?: Yes  Family / Caregiver Present: No  Referring Practitioner: Anup  Referral Date : 08/28/24  Diagnosis: Acute hypoxic respiratory failure  Follows Commands: Within Functional Limits  General Comment  Comments: Pt on heated HFNC.  Subjective  Subjective: Pt supine in bed and agreeable to therapy. Pt denies pain.         Social/Functional History  Social/Functional History  Lives With: Daughter  Type of Home: Apartment  Home Layout: One level  Home Access: Stairs to enter with rails  Entrance Stairs - Number of Steps: flight  Bathroom Shower/Tub: Tub/Shower unit  Bathroom Toilet: Standard  Bathroom Equipment: Shower chair  Home Equipment: Rollator  Has the patient had two or more falls in the past year or any fall with injury in the past year?: No  Receives Help From: Family  ADL Assistance: Independent  Homemaking Assistance: Needs  chair.  Stairs/Curb  Stairs?: No     Balance  Posture: Good  Sitting - Static: Good  Sitting - Dynamic: Good  Standing - Static: Fair  Standing - Dynamic: Fair  Comments: standing balance assessed with RW          OutComes Score                                                  AM-PAC - Mobility    -PAC Basic Mobility - Inpatient   How much help is needed turning from your back to your side while in a flat bed without using bedrails?: A Little  How much help is needed moving from lying on your back to sitting on the side of a flat bed without using bedrails?: A Little  How much help is needed moving to and from a bed to a chair?: A Little  How much help is needed standing up from a chair using your arms?: A Little  How much help is needed walking in hospital room?: Total  How much help is needed climbing 3-5 steps with a railing?: Total  AM-PAC Inpatient Mobility Raw Score : 14  AM-PAC Inpatient T-Scale Score : 38.1  Mobility Inpatient CMS 0-100% Score: 61.29  Mobility Inpatient CMS G-Code Modifier : CL         Tinneti Score       Goals  Short Term Goals  Time Frame for Short Term Goals: 14 visits  Short Term Goal 1: Independent bed mobility.  Short Term Goal 2: Pt to tolerate 30 minutes of therapy activity to improve strength for mobility.  Short Term Goal 3: Pt to transfer Modified (I) with walker and no LOB.  Short Term Goal 4: Pt to ambulate with walker 150' Modified (I) without LOB.  Short Term Goal 5: Pt to negotiate 12 steps with 1 rail SBA-S without LOB for entry into home.  Patient Goals   Patient Goals : Return home       Education  Patient Education  Education Given To: Patient  Education Provided: Role of Therapy;Plan of Care;Transfer Training  Education Provided Comments: importance of mobility  Education Method: Verbal  Barriers to Learning: None  Education Outcome: Verbalized understanding;Continued education needed      Therapy Time   Individual Concurrent Group Co-treatment   Time In 8294

## 2024-08-29 NOTE — ED PROVIDER NOTES
[facility] Emergency Department  [address]  [facility] [address]  Phone: [phone]  Fax: [phone]        [facility] EMERGENCY DEPARTMENT  EMERGENCY DEPARTMENT ENCOUNTER      Pt Name: Shanique Bender  MRN: [id number]  Birthdate 1945  Date of evaluation: 8/28/2024  Provider: Earl Mckee DO    CHIEF COMPLAINT       Chief Complaint   Patient presents with    Shortness of Breath         HISTORY OF PRESENT ILLNESS   (Location/Symptom, Timing/Onset,Context/Setting, Quality, Duration, Modifying Factors, Severity)  Note limiting factors.   Shanique Bender is a 78 y.o. female who presents to the emergency department for the evaluation of shortness of breath.  Patient has history of COPD and tested positive for COVID 3 days ago.  She was started on antiviral Monupivir for her COVID.  However, her breathing has worsened.  She normally needs about 3 to 4 L to maintain sats of about 95%.  Today, they had her on 6 L at the nursing home and her sats were in the 80s, EMS put her on full nonrebreather, gave her breathing treatment, steroids and magnesium, got her sats in the mid 90s.  She says she is feeling okay with the exception of a cough.  She feels a little short of breath at this time    Nursing Notes were reviewed.    REVIEW OF SYSTEMS    (2-9systems for level 4, 10 or more for level 5)     Review of Systems   Constitutional:  Negative for fever.   HENT:  Negative for sore throat.    Respiratory:  Positive for cough and shortness of breath.    Cardiovascular:  Negative for chest pain.   Gastrointestinal:  Negative for diarrhea and vomiting.   Genitourinary:  Negative for dysuria.   Skin:  Negative for rash.   Neurological:  Negative for weakness.   All other systems reviewed and are negative.      Except asnoted above the remainder of the review of systems was reviewed and negative.       PAST MEDICAL HISTORY     Past Medical History:   Diagnosis Date    Anemia      Chronic atrial fibrillation (HCC) 8/28/2020    Chronic obstructive pulmonary disease (HCC) 3/26/2018    Chronic renal disease, stage III ([facility]) [703597] 1/17/2024    Diabetes mellitus ([facility])     GERD (gastroesophageal reflux disease)     Hypertension     Other hyperlipidemia 3/26/2018    Right pontine stroke ([facility]) 1/5/2023    Systolic congestive heart failure ([facility]) 12/17/2019         SURGICAL HISTORY       Past Surgical History:   Procedure Laterality Date    COLONOSCOPY      HYSTERECTOMY (CERVIX STATUS UNKNOWN)      TONSILLECTOMY      TUBAL LIGATION      WISDOM TOOTH EXTRACTION           CURRENT MEDICATIONS     Previous Medications    ALBUTEROL SULFATE HFA (PROVENTIL;VENTOLIN;PROAIR) 108 (90 BASE) MCG/ACT INHALER    INHALE TWO PUFFS BY MOUTH EVERY 6 HOURS AS NEEDED FOR WHEEZING AS NEEDED (BULK)    BLOOD GLUCOSE TEST STRIPS (ONETOUCH VERIO) STRIP    USE ONE STRIP DAILY AS NEEDED    FUROSEMIDE (LASIX) 40 MG TABLET    TAKE 1 TABLET BY MOUTH DAILY    INSULIN GLARGINE (BASAGLAR KWIKPEN) 100 UNIT/ML INJECTION PEN    Inject 38 Units into the skin 2 times daily    INSULIN LISPRO (HUMALOG) 100 UNIT/ML SOLN INJECTION VIAL    Inject 2 Units into the skin 3 times daily (with meals) 2-16 units 3 times a day  on a sliding scale    INSULIN PEN NEEDLE (PEN NEEDLES) 31G X 6 MM MISC    USE WITH INSULIN TWICE DAILY    LANCETS MISC    1 each by Does not apply route daily Approved brand per Ins. Lancets to use daily twice a day for diabetes E11.9    LOSARTAN (COZAAR) 100 MG TABLET    TAKE ONE TABLET BY MOUTH EVERY MORNING AT 9:00AM    METFORMIN (GLUCOPHAGE-XR) 500 MG EXTENDED RELEASE TABLET    TAKE 1 TABLET BY MOUTH TWICE A DAY (9AM AND 5PM)    METOPROLOL SUCCINATE (TOPROL XL) 50 MG EXTENDED RELEASE TABLET    TAKE ONE TABLET BY MOUTH TWICE A DAY AT 9 AM. AND 5 PM.    SITAGLIPTIN (JANUVIA) 25 MG TABLET    Take 1 tablet by mouth daily    SPIRONOLACTONE (ALDACTONE) 25 MG TABLET    TAKE 1 TABLET BY MOUTH DAILY AT 9 A.M.    TIOTROPIUM  She does have decreased renal function with a creatinine of 1.5 and GFR of 35, I do not want to give contrast and get a CT.  I will obtain x-ray at this time, she has improved in the ED, low suspicion for blood clot [TS]   2201 X-ray does show some interstitial findings consistent with viral pneumonia.  Starting the patient on antibiotics and will admit for further evaluation and treat [TS]      ED Course User Index  [TS] Earl Mckee, DO     Critical Care: The high probability of sudden, clinically significant deterioration in the patient's condition required the highest level of my preparedness to intervene urgently.    The services I provided to this patient were to treat and/or prevent clinically significant deterioration. Services included the following: chart data review, reviewing nursing notes and/or old charts, documentation time, consultant collaboration regarding findings and treatment options, medication orders and management, direct patient care, vital sign assessments and ordering, interpreting and reviewing diagnostic studies/lab tests.    Aggregate critical care time includes only time during which I was engaged in work directly related to the patient's care, as described above, whether at the bedside or elsewhere in the Emergency Department.  It did not include time spent performing other reported procedures or the services of residents, students, nurses or physician assistants.    Critical Care:   35 minutes    PROCEDURES:  Unless otherwise noted below, none     Procedures    FINAL IMPRESSION      1. COVID-19    2. Hypoxemia    3. Acute bronchitis, unspecified organism          DISPOSITION/PLAN   DISPOSITION Admitted 08/28/2024 10:23:15 PM  Condition at Disposition: Data Unavailable      PATIENT REFERRED TO:  No follow-up provider specified.    DISCHARGE MEDICATIONS:  New Prescriptions    No medications on file          (Please note that portions of this note were completed with a voice  recognition program.  Efforts were made to edit the dictations but occasionally words are mis-transcribed.)    Earl Mckee DO,(electronically signed)  Board Certified Emergency Physician          Earl Mckee DO  08/28/24 1213

## 2024-08-29 NOTE — PROGRESS NOTES
[facility]  Office: [phone]  José Miguel Hebert DO, Jaime Interiano DO, Terrell Long DO, Bernabe Mcclain DO, Anila Lindquist MD, Katie Walker MD, Philip Morrissey MD, Flakita Christianson MD,  Jakub Hoffman MD, Veroinca Ireland MD, Steven Camacho MD,  Aundrea Mosqueda DO, Tish Baker MD, Nicolás Sanchez MD, Hasmukh Hebert DO, Barbara Casper MD,  Hao Rogers DO, Marley Kuo MD, Huma Stone MD, Sarah Cole MD, Kuldip Aldridge MD,  Talon Gates MD, Roxana Sams MD, Fito Bermudez MD, Anat De La Garza MD, Simeon Mike MD, Liz Munoz MD, Vaughn Scott DO, Villa Suero DO, James Thakkar DO, Saniya Segura MD,  Patrick Shell MD, Shirley Waterhouse, CNP,  Zita Karimi, CNP, Vaughn Go, CNP,  Angelita Dinero, SHANNON, Cece Hawthorne, CNP, Hayley Bruner, CNP, Hanna Lane, CNP, Joi Shay, CNP, Orly Chang, PA-C, Ruchi Trinidad, PA-C, Patricia Thompson, CNP, Giovanni Meza, CNP,  Cele Gtz, CNP, Preethi Hogan, CNP, Kindra Reeves, CNP, Sherry Gomez, CNS, Antonia Humphreys, CNP, Jackie Lester, CNP, Tracy Schwab, CNP         [facility]   IN-PATIENT SERVICE   [facility]    Progress Note    8/29/2024    9:25 AM    Name:   Shanique Bender  MRN:     [id number]     Acct:      [id number]   Room:   341/341-01   Day:  1  Admit Date:  8/28/2024  7:58 PM    PCP:   Karma Patrick MD  Code Status:  DNR-CCA    Subjective:     Patient seen in follow-up for acute hypoxic respiratory failure.  Patient states \"I am okay\"    Chart reviewed, this very pleasant 78-year-old female was brought to the hospital due to worsening shortness of breath.  The patient has chronic hypoxic respiratory failure secondary to COPD and wears approximately 3 to 4 L of oxygen daily.  She was recently diagnosed with COVID this last Sunday.  Since that time she has developed worsening shortness of breath and productive cough.  Given concerns for a superimposed pneumonia over her COVID the patient has been             Last Modified POA    * (Principal) Acute hypoxic respiratory failure (HCC) 8/28/2024 Yes    Chronic obstructive pulmonary disease (HCC) 8/29/2024 Yes    Oxygen dependent 8/29/2024 Yes    CHF (congestive heart failure) ([facility]) 8/29/2024 Yes    Type 1 diabetes mellitus with stage 3 chronic kidney disease ([facility]) 8/29/2024 Yes    Chronic renal disease, stage III ([facility]) [161488] 8/29/2024 Yes    Essential (primary) hypertension 8/29/2024 Yes    COVID-19 8/29/2024 Yes    GUSTABO (acute kidney injury) ([facility]) 8/29/2024 Yes       Plan:     Acute on chronic hypoxic respiratory failure  Continue BiPAP  N.p.o.  Consult pulmonology  No significant wheezing on exam, that said given COVID diagnosis reasonable to initiate Decadron  Acute on chronic diastolic CHF exacerbation  Continue IV Lasix  Daily labs  COVID +/- pneumonia  Initiate Decadron  Continue Rocephin/azithromycin  Consult infectious disease and pulmonology  Diabetes mellitus with stage IIIb chronic kidney disease  Hold Lantus while n.p.o.  Continue sliding scale  Essential hypertension  Continue Toprol    Medical Decision Making: Frederick Hebert DO  8/29/2024  9:25 AM

## 2024-08-29 NOTE — PLAN OF CARE
Problem: Respiratory - Adult  Goal: Achieves optimal ventilation and oxygenation  Outcome: Progressing  Flowsheets  Taken 8/29/2024 1323 by Evelin Dill RCP  Achieves optimal ventilation and oxygenation:   Assess for changes in respiratory status   Position to facilitate oxygenation and minimize respiratory effort   Assess the need for suctioning and aspirate as needed   Respiratory therapy support as indicated   Assess and instruct to report shortness of breath or any respiratory difficulty   Encourage broncho-pulmonary hygiene including cough, deep breathe, incentive spirometry   Oxygen supplementation based on oxygen saturation or arterial blood gases   Assess for changes in mentation and behavior

## 2024-08-29 NOTE — PROGRESS NOTES
08/28/24 1804   NIV Type   NIV Started/Stopped On   Equipment Type V60   Mode Bilevel   Mask Type Full face mask   Mask Size Medium   Assessment   Level of Consciousness 0   Comfort Level Good   Using Accessory Muscles Yes   Mask Compliance Good   Skin Assessment Clean, dry, & intact   Settings/Measurements   PIP Observed 13 cm H20   IPAP 12 cmH20   CPAP/EPAP 6 cmH2O   Vt (Measured) 623 mL   Rate Ordered 12   Insp Rise Time (%) 3 %   FiO2  90 %   I Time/ I Time % 1 s   Minute Volume (L/min) 16 Liters   Mask Leak (lpm) 40 lpm

## 2024-08-29 NOTE — RT PROTOCOL NOTE
RT Nebulizer Bronchodilator Protocol Note    There is a bronchodilator order in the chart from a provider indicating to follow the RT Bronchodilator Protocol and there is an “Initiate RT Bronchodilator Protocol” order as well (see protocol at bottom of note).    CXR Findings:  XR CHEST PORTABLE    Result Date: 8/28/2024  Moderate cardiomegaly with pulmonary venous congestion and perihilar pulmonary edema.       The findings from the last RT Protocol Assessment were as follows:  Smoking: Chronic pulmonary disease  Respiratory Pattern: Mild dyspnea at rest, irregular pattern, or RR 21-25 bpm  Breath Sounds: Intermittent or unilateral wheezes  Cough: Weak, productive  Indication for Bronchodilator Therapy:    Bronchodilator Assessment Score: 12    Aerosolized bronchodilator medication orders have been revised according to the RT Nebulizer Bronchodilator Protocol below.    Respiratory Therapist to perform RT Therapy Protocol Assessment initially then follow the protocol.  Repeat RT Therapy Protocol Assessment PRN for score 0-3 or on second treatment, BID, and PRN for scores above 3.    No Indications - adjust the frequency to every 6 hours PRN wheezing or bronchospasm, if no treatments needed after 48 hours then discontinue using Per Protocol order mode.     If indication present, adjust the RT bronchodilator orders based on the Bronchodilator Assessment Score as indicated below.  If a patient is on this medication at home then do not decrease Frequency below that used at home.    0-3 - enter or revise RT bronchodilator order(s) to equivalent RT Bronchodilator order with Frequency of every 4 hours PRN for wheezing or increased work of breathing using Per Protocol order mode.       4-6 - enter or revise RT Bronchodilator order(s) to two equivalent RT bronchodilator orders with one order with BID Frequency and one order with Frequency of every 4 hours PRN wheezing or increased work of breathing using Per Protocol order

## 2024-08-29 NOTE — RT PROTOCOL NOTE
RT Nebulizer Bronchodilator Protocol Note    There is a bronchodilator order in the chart from a provider indicating to follow the RT Bronchodilator Protocol and there is an “Initiate RT Bronchodilator Protocol” order as well (see protocol at bottom of note).    CXR Findings:  XR CHEST PORTABLE    Result Date: 8/28/2024  Moderate cardiomegaly with pulmonary venous congestion and perihilar pulmonary edema.       The findings from the last RT Protocol Assessment were as follows:  Smoking: Smoker 15 pack years or more  Respiratory Pattern: Mild dyspnea at rest, irregular pattern, or RR 21-25 bpm  Breath Sounds: Intermittent or unilateral wheezes  Cough: Weak, non-productive  Indication for Bronchodilator Therapy:    Bronchodilator Assessment Score: 12    Aerosolized bronchodilator medication orders have been revised according to the RT Nebulizer Bronchodilator Protocol below.    Respiratory Therapist to perform RT Therapy Protocol Assessment initially then follow the protocol.  Repeat RT Therapy Protocol Assessment PRN for score 0-3 or on second treatment, BID, and PRN for scores above 3.    No Indications - adjust the frequency to every 6 hours PRN wheezing or bronchospasm, if no treatments needed after 48 hours then discontinue using Per Protocol order mode.     If indication present, adjust the RT bronchodilator orders based on the Bronchodilator Assessment Score as indicated below.  If a patient is on this medication at home then do not decrease Frequency below that used at home.    0-3 - enter or revise RT bronchodilator order(s) to equivalent RT Bronchodilator order with Frequency of every 4 hours PRN for wheezing or increased work of breathing using Per Protocol order mode.       4-6 - enter or revise RT Bronchodilator order(s) to two equivalent RT bronchodilator orders with one order with BID Frequency and one order with Frequency of every 4 hours PRN wheezing or increased work of breathing using Per Protocol

## 2024-08-29 NOTE — PLAN OF CARE
Problem: Safety - Adult  Goal: Free from fall injury  Outcome: Progressing  Flowsheets (Taken 8/29/2024 0101)  Free From Fall Injury:   Instruct family/caregiver on patient safety   Based on caregiver fall risk screen, instruct family/caregiver to ask for assistance with transferring infant if caregiver noted to have fall risk factors     Problem: Discharge Planning  Goal: Discharge to home or other facility with appropriate resources  Outcome: Progressing  Flowsheets (Taken 8/29/2024 0101)  Discharge to home or other facility with appropriate resources:   Identify barriers to discharge with patient and caregiver   Arrange for needed discharge resources and transportation as appropriate   Identify discharge learning needs (meds, wound care, etc)

## 2024-08-29 NOTE — PROGRESS NOTES
Occupational Therapy  Facility/Department: [address]   Occupational Therapy Initial Evaluation    Patient Name: Shanique Bender        MRN: [id number]    : 1945    Date of Service: 2024    Chief Complaint   Patient presents with    Shortness of Breath     Discharge Recommendations  Discharge Recommendations: Patient would benefit from continued therapy after discharge    Assessment  Performance deficits / Impairments: Decreased functional mobility ;Decreased ADL status;Decreased strength;Decreased safe awareness;Decreased balance;Decreased endurance;Decreased cognition;Decreased high-level IADLs;Decreased fine motor control;Decreased coordination  Assessment: Pt presents with decreased ADL status secondary to above noted deficits, most significantly decreased activity tolerance. Pt to benefit from continued therapy services while hospitalized to maximize pt's safety and independence in performing functional tasks. At this time, pt has not demonstrated the functional ability to safely return to prior living arrangements, continued skilled OT intervention recommended prior to an eventual return to home.  Prognosis: Good  Decision Making: Medium Complexity  REQUIRES OT FOLLOW-UP: Yes  Activity Tolerance  Activity Tolerance: Patient limited by fatigue  Safety Devices  Type of Devices: Call light within reach;Chair alarm in place;Left in chair;Nurse notified  Restraints  Restraints Initially in Place: No    Restrictions/Precautions  Restrictions/Precautions  Restrictions/Precautions: Isolation;Fall Risk (COVID+)  Required Braces or Orthoses?: No  Position Activity Restriction  Other position/activity restrictions: Up with assistance. High flow nasal canula in place.    Subjective  General  Patient assessed for rehabilitation services?: Yes  Family / Caregiver Present: No  General Comment  Comments: RN ok'd for therapy this PM. Pt agreeable to participate in session and pleasant/cooperative throughout. Pt  Bathing: Increased time to complete;Minimal assistance  UE Dressing: Increased time to complete;Contact guard assistance  LE Dressing: Increased time to complete;Minimal assistance  LE Dressing Skilled Clinical Factors: Pt seated EOB to don socks using figure four technique- inceased time/effort to achieve figure four technique and min A to maintain, rest break required throughout due to increased work of breathing, min VCs for sequencing/problem solving and breathing techniques  Toileting: Increased time to complete;Minimal assistance    Balance  Balance  Sitting:  (SBA- static, CGA- dynamic)  Standing:  (CGA- static, min A- dynamic)    Transfers/Mobility  Bed mobility  Supine to Sit: Stand by assistance  Sit to Supine:  (pt retired up to chair at end of session)  Scooting: Stand by assistance  Bed Mobility Comments: HOB raised ~45* and use of bed rail; increased time/effort to perform; min VCs for awareness of lines/cords as pt somewhat impulsive and for breathing techniques throughout    Transfers  Sit to stand: Contact guard assistance  Stand to sit: Contact guard assistance  Transfer Comments: Min VCs for proper hand placement/sequencing/safety awareness with use of RW; increased time/effort to perform    Functional Mobility: Contact guard assistance;Increased time to complete  Functional Mobility Skilled Clinical Factors: Pt performed 1x short bout of functional mobility from bed > chair with use of RW; mildly unsteady however no true LOB; quick pace/impulsivity with VCs required for awareness of lines/cords throughout; quick to fatigue with noted increased work of breathing with minimal exertion requiring VCs for breathing techniques throughout       Patient Education  Patient Education  Education Given To: Patient  Education Provided: Role of Therapy;Plan of Care (Activity Promotion, Bed Mobility Techniques, Safety with Transfers/RW Mngt, Safety Awareness/Fall Prevention, ADL Techniques, Energy

## 2024-08-30 NOTE — PROGRESS NOTES
Pulmonary Progress Note  [facility]      Patient - Shanique Bender,  Age - 78 y.o.    - 1945      Room Number - 341/341-01   N -  [id number]   [facility] # - [id number]  Date of Admission -  2024  7:58 PM        Consulting Service/Physician   Consulting - Hasmukh Hebert DO  Primary Care Physician - Karma Patrick MD     SUBJECTIVE   She looks better, currently on 40 L with FiO2 now 65% saturating around 92%    OBJECTIVE   VITALS    height is 1.702 m (5' 7\") and weight is 102.1 kg (225 lb). Her oral temperature is 97.1 °F (36.2 °C). Her blood pressure is 138/96 (abnormal) and her pulse is 70. Her respiration is 18 and oxygen saturation is 86% (abnormal).     Body mass index is 35.24 kg/m².  Temperature Range: Temp: 97.1 °F (36.2 °C) Temp  Av °F (36.7 °C)  Min: 97.1 °F (36.2 °C)  Max: 98.4 °F (36.9 °C)  BP Range:  Systolic (24hrs), Av , Min:131 , Max:144     Diastolic (24hrs), Av, Min:66, Max:96    Pulse Range: Pulse  Av.4  Min: 65  Max: 79  Respiration Range: Resp  Av.7  Min: 12  Max: 32  Current Pulse Ox::  SpO2: (!) 86 %  24HR Pulse Ox Range:  SpO2  Av.6 %  Min: 78 %  Max: 95 %  Oxygen Amount and Delivery: O2 Flow Rate (L/min): 40 L/min    Wt Readings from Last 3 Encounters:   24 102.1 kg (225 lb)   24 97.1 kg (214 lb)   24 101.7 kg (224 lb 3.2 oz)       I/O (24 Hours)    Intake/Output Summary (Last 24 hours) at 2024 1742  Last data filed at 2024 1445  Gross per 24 hour   Intake 480 ml   Output 1000 ml   Net -520 ml       EXAM     General Appearance  Awake, alert, oriented, in no acute distress  HEENT - normocephalic, atraumatic. []  Mallampati  [] Crowded airway   [] Macroglossia  []  Retrognathia  [] Micrognathia  []  Normal tongue size []  Normal Bite  [] Fabiola sign positive    Neck - Supple,  trachea midline   Lungs -diminished without wheezes/crackles  Cardiovascular - Heart     MG 2.4 08/28/2024 09:05 PM     Phosphorus:  No results found for: \"PHOS\"     LIVER PROFILE   Recent Labs     08/28/24 2105   AST 19   ALT 9   BILITOT 0.4   ALKPHOS 145*     INR No results for input(s): \"INR\" in the last 72 hours.  PTT   Lab Results   Component Value Date    APTT 57.8 (H) 08/23/2020         RADIOLOGY     (See actual reports for details)    ASSESSMENT/PLAN     Acute on chronic hypoxic respiratory failure-patient states that she only wears oxygen at night but reportedly she is supposed to be on 3 to 4 L nasal cannula  COVID-19 positive 8/25, repeat +8/28  Elevated proBNP with evidence of acute on chronic congestive heart failure  COPD exacerbation with scattered wheezes (no documented COPD by PFTs, this is a clinical diagnosis)  Type 2 diabetes  Chronic kidney disease stage III  Hypertension  Hard of hearing  Questionable dementia  DNR CCA no intubation    Continue to wean oxygen keep saturations greater than 88%  She may have a lot of shunt physiology and I would consider just putting her on a salter device tomorrow at 15 L and keeping her sats greater than 88%  Continue Decadron will decrease to every 12 hours  Agree that inflammatory markers are trending downwards so hopefully the molnupiravir may have helped her (she received it in the nursing home)   Continue antitussive  She should be getting her albuterol least 4 times a day minimal given the severity of her COPD I have taken her off respiratory protocol    Electronically signed by Aneesh Fallon MD on 8/30/2024 at 5:45 PM

## 2024-08-30 NOTE — PROGRESS NOTES
in place.    Subjective  General  Patient assessed for rehabilitation services?: Yes  Response to previous treatment: Patient with no complaints from previous session  Family / Caregiver Present: No  General Comment  Comments: RN ok'd for therapy this AM. Pt agreeable to participate in session and pleasant/cooperative throughout. Pt reports 8/10 bilateral knee pain, assisted pt to reposition/increase activity for comfort.    Objective  Cognition  Overall Cognitive Status: Exceptions  Arousal/Alertness: Appears intact  Following Commands: Appears intact  Attention Span: Attends with cues to redirect  Safety Judgement: Decreased awareness of need for safety;Decreased awareness of need for assistance  Problem Solving: Decreased awareness of errors;Assistance required to identify errors made;Assistance required to generate solutions  Insights: Decreased awareness of deficits  Initiation: Requires cues for some  Sequencing: Requires cues for some    Activities of Daily Living  Grooming: Minimal assistance;Increased time to complete  Grooming Skilled Clinical Factors: Pt seated unsupported at edge of recliner chair to perform grooming tasks including oral care and facial hygiene  UE Dressing: Increased time to complete;Minimal assistance  UE Dressing Skilled Clinical Factors: Pt seated unsupported at edge of recliner chair to doff/don hospital gon    Balance  Balance  Sitting:  (SBA- static, CGA- dynamic; pt seated unsupported at edge of recliner chair ~10 minutes during engagement in simple grooming tasks at tray table, mildly unsteady with dynamic tasks, quick to fatigue with supported rest breaks required throughout)  Standing:  (3x bouts of static/dynamic standing, ~2-3 minutes at each bout, mildly unsteady however no true LOB, SpO2 maintained 87-89% throughout however frequent cueing for pursed lip breathing techniques required throughout)    Transfers/Mobility  Bed mobility  Bed Mobility Comments: Pt up to chair at  therapist arrival and retired up to chair at therapist exit.    Transfers  Sit to stand: Minimal assistance (3x from chair)  Stand to sit: Contact guard assistance  Transfer Comments: Min VCs for proper hand placement/sequencing/safety awareness with use of RW; increased time/effort to perform      Patient Education  Patient Education  Education Given To: Patient  Education Provided: Role of Therapy;Plan of Care (Activity Promotion, Bed Mobility Techniques, Safety with Transfers/RW Mngt, Safety Awareness/Fall Prevention, ADL Techniques, Energy Conservation/Pursed Lip Breathing Techniques)  Education Method: Demonstration;Verbal  Barriers to Learning: Cognition  Education Outcome: Verbalized understanding;Continued education needed    Goals  Short Term Goals  Time Frame for Short Term Goals: 14 visits  Short Term Goal 1: Pt will perform ADL tasks with mod IND using AE/DME PRN  Short Term Goal 2: Pt will perform functional transfers/functional mobility with mod IND using LRAD  Short Term Goal 3: Pt will independently demo good safety awareness during engagement in all ADLs and functional transfers/functional mobility  Short Term Goal 4: Pt will demo 8+ minutes tolerance to static/dynamic standing tasks for increased ADL participation    Plan  Occupational Therapy Plan  Times Per Week: 5-6x/wk  Current Treatment Recommendations: Strengthening, Balance training, Functional mobility training, Endurance training, Patient/Caregiver education & training, Safety education & training, Equipment evaluation, education, & procurement, Self-Care / ADL, Coordination training    AM-PAC Daily Activities Inpatient  AM-PAC Daily Activity - Inpatient   How much help is needed for putting on and taking off regular lower body clothing?: A Little  How much help is needed for bathing (which includes washing, rinsing, drying)?: A Little  How much help is needed for toileting (which includes using toilet, bedpan, or urinal)?: A Little  How  much help is needed for putting on and taking off regular upper body clothing?: A Little  How much help is needed for taking care of personal grooming?: A Little  How much help for eating meals?: None  AM-PAC Inpatient Daily Activity Raw Score: 19  AM-PAC Inpatient ADL T-Scale Score : 40.22  ADL Inpatient CMS 0-100% Score: 42.8  ADL Inpatient CMS G-Code Modifier : CK    Minutes  OT Individual Minutes  Time In: 1104  Time Out: 1130  Minutes: 26    Electronically signed by Monie Savage OT on 8/30/24 at 12:40 PM EDT

## 2024-08-30 NOTE — PLAN OF CARE
BRONCHOSPASM/BRONCHOCONSTRICTION    IMPROVE  AERATION/BREATHSOUNDS  ADMINISTER BRONCHODILATOR THERAPY AS APPROPRIATE  ASSESS BREATH SOUNDS  PATIENT EDUCATION AS NEEDEDInhaler / Aerosol Education        [x] Served spacer    [] Provided and reviewed booklet   [x] Good return demonstration per patient   [x] Aerosolized Medications:     Verbal education has been provided in the use, benefits and possible adverse reactions of aerosolized medications used in the treatment of this patient.    [x] Other:NON INVASIVE VENTILATION  PROVIDE OPTIMAL VENTILATION/ACCEPTABLE SP02  IMPLEMENT NON INVASIVE VENTILATION PROTOCOL  ASSESSMENT SKIN INTEGRITY  PATIENT EDUCATION AS NEEDED  BIPAP AS NEEDED      Problem: Respiratory - Adult  Goal: Achieves optimal ventilation and oxygenation  8/29/2024 2002 by Erika Martines RCP  Outcome: Progressing

## 2024-08-30 NOTE — PROGRESS NOTES
[facility]  Office: [phone]  José Miguel Hebert DO, Jaime Interiano, DO, Terrell Long DO, Bernabe Mcclain, DO, Anila Lindquist MD, Katie Walker MD, Philip Morrissey MD, Flakita Christianson MD,  Jakub Hoffman MD, Veronica Ireland MD, Steven Camacho MD,  Aundrea Mosqueda DO, Tish Baker MD, Nicolás Sanchez MD, Hasmukh Hebert DO, Barbara Casper MD,  Hao Rogers DO, Marley Kuo MD, Huma Stone MD, Sarah Cole MD, Kuldip Aldridge MD,  Talon Gates MD, Roxana Sams MD, Fito Bermudez MD, Anat De La Garza MD, Simeon Mike MD, Liz Munoz MD, Vaughn Scott DO, Villa Suero DO, James Thakkar DO, Saniya Segura MD,  Patrick Shell MD, Shirley Waterhouse, CNP,  Zita Karimi, CNP, Vaughn Go, CNP,  Angelita Dinero, SAHNNON, Cece Hawthorne, CNP, Hayley Bruner, CNP, Hanna Lane, CNP, Joi Shay, CNP, Orly Chang, PA-C, Ruchi Trinidad, PA-C, Patricia Thompson, CNP, Giovanni Meza, CNP,  Cele Gtz, CNP, Preethi Hogan, CNP, Kindra Reeves, CNP, Sherry Gomez, CNS, Antonia Humphreys, CNP, Jackie Lester, CNP, Tracy Schwab, CNP         [facility]   IN-PATIENT SERVICE   [facility]    Progress Note    8/30/2024    11:35 AM    Name:   Shanique Bender  MRN:     [id number]     Acct:      [id number]   Room:   341/341-01   Day:  2  Admit Date:  8/28/2024  7:58 PM    PCP:   Karma Patrick MD  Code Status:  DNR-CCA    Subjective:     Patient seen in follow-up for acute on chronic hypoxic respiratory failure secondary to decompensated diastolic congestive heart failure, recent COVID-19 with suspected COPD exacerbation +/- bacterial pneumonia.  Patient states \"I am feeling better\"    Patient is off BiPAP and doing well with high flow oxygen.  She states that her breathing has improved dramatically.  Pulmonology/infectious disease input noted and appreciated.  At this point in time we will continue with diuresis, steroids and antibiotics.  Renal function is holding steady with  normal bowel sounds, no masses, hepatomegaly, splenomegaly  Extremities:  no edema, redness, tenderness in the calves  Skin:  no gross lesions, rashes, induration    Assessment:     Hospital Problems             Last Modified POA    * (Principal) Acute hypoxic respiratory failure ([facility]) 8/28/2024 Yes    Chronic obstructive pulmonary disease ([facility]) 8/29/2024 Yes    Oxygen dependent 8/29/2024 Yes    CHF (congestive heart failure) ([facility]) 8/29/2024 Yes    Type 1 diabetes mellitus with stage 3 chronic kidney disease ([facility]) 8/29/2024 Yes    Chronic renal disease, stage III ([facility]) [040047] 8/29/2024 Yes    Essential (primary) hypertension 8/29/2024 Yes    COVID-19 8/29/2024 Yes    GUSTABO (acute kidney injury) ([facility]) 8/29/2024 Yes       Plan:     Acute on chronic hypoxic respiratory failure  Continue to wean supplemental oxygen as tolerated  Presently on high flow oxygen  Baseline respiratory needs approximately 3 to 4 L/min  Acute on chronic diastolic CHF exacerbation  Continue diuresis, monitor renal function  COPD exacerbation +/- pneumonia  Continue steroids and antibiotics  Pulmonology following  Infectious disease following  COVID-19  Appreciate infectious disease input  Continue steroids  Stage IIIa chronic kidney disease  Monitor renal function while undergoing diuresis  Renal function slightly above baseline  Check morning labs  Diabetes mellitus  Increase Lantus to home dosing  High intensity sliding scale  Essential hypertension  Continue Toprol    Medical Decision Making: Frederick Hebert DO  8/30/2024  11:35 AM

## 2024-08-30 NOTE — RT PROTOCOL NOTE
RT Nebulizer Bronchodilator Protocol Note    There is a bronchodilator order in the chart from a provider indicating to follow the RT Bronchodilator Protocol and there is an “Initiate RT Bronchodilator Protocol” order as well (see protocol at bottom of note).        Per MD MDI ordered     CXR Findings:  XR CHEST PORTABLE    Result Date: 8/28/2024  Moderate cardiomegaly with pulmonary venous congestion and perihilar pulmonary edema.       The findings from the last RT Protocol Assessment were as follows:  Smoking: Chronic pulmonary disease  Respiratory Pattern: Mild dyspnea at rest, irregular pattern, or RR 21-25 bpm  Breath Sounds: Intermittent or unilateral wheezes  Cough: Weak, productive  Indication for Bronchodilator Therapy:    Bronchodilator Assessment Score: 12    Aerosolized bronchodilator medication orders have been revised according to the RT Nebulizer Bronchodilator Protocol below.    Respiratory Therapist to perform RT Therapy Protocol Assessment initially then follow the protocol.  Repeat RT Therapy Protocol Assessment PRN for score 0-3 or on second treatment, BID, and PRN for scores above 3.    No Indications - adjust the frequency to every 6 hours PRN wheezing or bronchospasm, if no treatments needed after 48 hours then discontinue using Per Protocol order mode.     If indication present, adjust the RT bronchodilator orders based on the Bronchodilator Assessment Score as indicated below.  If a patient is on this medication at home then do not decrease Frequency below that used at home.    0-3 - enter or revise RT bronchodilator order(s) to equivalent RT Bronchodilator order with Frequency of every 4 hours PRN for wheezing or increased work of breathing using Per Protocol order mode.       4-6 - enter or revise RT Bronchodilator order(s) to two equivalent RT bronchodilator orders with one order with BID Frequency and one order with Frequency of every 4 hours PRN wheezing or increased work of breathing  using Per Protocol order mode.         7-10 - enter or revise RT Bronchodilator order(s) to two equivalent RT bronchodilator orders with one order with TID Frequency and one order with Frequency of every 4 hours PRN wheezing or increased work of breathing using Per Protocol order mode.       11-13 - enter or revise RT Bronchodilator order(s) to one equivalent RT bronchodilator order with QID Frequency and an Albuterol order with Frequency of every 4 hours PRN wheezing or increased work of breathing using Per Protocol order mode.      Greater than 13 - enter or revise RT Bronchodilator order(s) to one equivalent RT bronchodilator order with every 4 hours Frequency and an Albuterol order with Frequency of every 2 hours PRN wheezing or increased work of breathing using Per Protocol order mode.     RT to enter RT Home Evaluation for COPD & MDI Assessment order using Per Protocol order mode.    Electronically signed by CYN KNOX RCP on 8/29/2024 at 8:04 PM

## 2024-08-30 NOTE — PROGRESS NOTES
Physical Therapy  Facility/Department: [address]  Physical Therapy Daily Progress Note    Name: Shanique Bender  : 1945  MRN: [id number]  Date of Service: 2024    Discharge Recommendations:  Patient would benefit from continued therapy after discharge   PT Equipment Recommendations  Equipment Needed: No      Patient Diagnosis(es): The primary encounter diagnosis was COVID-19. Diagnoses of Hypoxemia and Acute bronchitis, unspecified organism were also pertinent to this visit.  Past Medical History:  has a past medical history of Anemia, Chronic atrial fibrillation (HCC), Chronic obstructive pulmonary disease (HCC), Chronic renal disease, stage III (HCC) [821879], Diabetes mellitus (HCC), GERD (gastroesophageal reflux disease), Hypertension, Other hyperlipidemia, Right pontine stroke (HCC), and Systolic congestive heart failure (HCC).  Past Surgical History:  has a past surgical history that includes Hysterectomy; Colonoscopy; Tubal ligation; Tonsillectomy; and [place] tooth extraction.    Assessment  Body Structures, Functions, Activity Limitations Requiring Skilled Therapeutic Intervention: Decreased functional mobility ;Decreased balance;Decreased strength;Decreased endurance;Decreased safe awareness  Assessment: Pt presents with decreased strength and mobility with decreased activity tolerance due to admission for acute hypoxic respiratory failure and Covid. At baseline, pt resides in apartment with her daughter and has a flight of steps to enter; pt reports ambulating occasionally with a rollator but mostly without a device. Currently, pt is on heated HFNC and is limited with mobility at this time. Pt performed sit to stand with RW with CGA; stood 2 minutes x 2 reps with PO2 85-87%. Will progress pt mobility as able with further discharge recommendations to follow. Pt will continue to benefit from acute care therapy during admission.  Treatment Diagnosis: decreased mobility  Therapy Prognosis:  Fair  Comments: standing balance assessed with RW  A/AROM Exercises: Seated LE PRE's x 10 reps: ankle pumps, LAQ's, hip flexion, pillow squeeze; extended rest breaks between exercises. Standing with RW marchng x 10 (B) LE.       OutComes Score                                                  AM-PAC - Mobility    AM-PAC Basic Mobility - Inpatient   How much help is needed turning from your back to your side while in a flat bed without using bedrails?: A Little  How much help is needed moving from lying on your back to sitting on the side of a flat bed without using bedrails?: A Little  How much help is needed moving to and from a bed to a chair?: A Little  How much help is needed standing up from a chair using your arms?: A Little  How much help is needed walking in hospital room?: Total  How much help is needed climbing 3-5 steps with a railing?: Total  AM-PAC Inpatient Mobility Raw Score : 14  AM-PAC Inpatient T-Scale Score : 38.1  Mobility Inpatient CMS 0-100% Score: 61.29  Mobility Inpatient CMS G-Code Modifier : CL         Tinneti Score       Goals  Short Term Goals  Time Frame for Short Term Goals: 14 visits  Short Term Goal 1: Independent bed mobility.  Short Term Goal 2: Pt to tolerate 30 minutes of therapy activity to improve strength for mobility.  Short Term Goal 3: Pt to transfer Modified (I) with walker and no LOB.  Short Term Goal 4: Pt to ambulate with walker 150' Modified (I) without LOB.  Short Term Goal 5: Pt to negotiate 12 steps with 1 rail SBA-S without LOB for entry into home.  Patient Goals   Patient Goals : Return home       Education  Patient Education  Education Given To: Patient  Education Provided: Plan of Care;Transfer Training;Energy Conservation  Education Provided Comments: deep breathing, LE exercise  Education Method: Demonstration;Verbal  Barriers to Learning: None  Education Outcome: Verbalized understanding;Demonstrated understanding;Continued education needed      Therapy Time

## 2024-08-30 NOTE — PROGRESS NOTES
08/29/24 2001   Oxygen Therapy/Pulse Ox   O2 Therapy Oxygen humidified   O2 Device Heated high flow cannula   O2 Flow Rate (L/min) (S)  35 L/min   FiO2  (S)  80 %   Humidification Source Heated wire   Humidification Temp 34   $Pulse Oximeter $Spot check (multiple/continuous)     Per MD Order keep SpO2 >87%. Weaning HFNC.

## 2024-08-30 NOTE — PLAN OF CARE
Problem: Safety - Adult  Goal: Free from fall injury  8/29/2024 2223 by Lindy Bolton RN  Outcome: Progressing  Flowsheets  Taken 8/29/2024 2223 by Lindy Bolton RN  Free From Fall Injury:   Instruct family/caregiver on patient safety   Based on caregiver fall risk screen, instruct family/caregiver to ask for assistance with transferring infant if caregiver noted to have fall risk factors    Problem: Chronic Conditions and Co-morbidities  Goal: Patient's chronic conditions and co-morbidity symptoms are monitored and maintained or improved  8/29/2024 2223 by Lindy Bolton RN  Outcome: Progressing  Flowsheets (Taken 8/29/2024 2223)  Care Plan - Patient's Chronic Conditions and Co-Morbidity Symptoms are Monitored and Maintained or Improved:   Monitor and assess patient's chronic conditions and comorbid symptoms for stability, deterioration, or improvement   Collaborate with multidisciplinary team to address chronic and comorbid conditions and prevent exacerbation or deterioration

## 2024-08-30 NOTE — RT PROTOCOL NOTE
RT Inhaler-Nebulizer Bronchodilator Protocol Note    There is a bronchodilator order in the chart from a provider indicating to follow the RT Bronchodilator Protocol and there is an “Initiate RT Inhaler-Nebulizer Bronchodilator Protocol” order as well (see protocol at bottom of note).    CXR Findings:  XR CHEST PORTABLE    Result Date: 8/28/2024  Moderate cardiomegaly with pulmonary venous congestion and perihilar pulmonary edema.       The findings from the last RT Protocol Assessment were as follows:   History Pulmonary Disease: Chronic pulmonary disease  Respiratory Pattern: Dyspnea on exertion or RR 21-25 bpm  Breath Sounds: Slightly diminished and/or crackles  Cough: Weak, non-productive  Indication for Bronchodilator Therapy:    Bronchodilator Assessment Score: 9    Aerosolized bronchodilator medication orders have been revised according to the RT Inhaler-Nebulizer Bronchodilator Protocol below.    Respiratory Therapist to perform RT Therapy Protocol Assessment initially then follow the protocol.  Repeat RT Therapy Protocol Assessment PRN for score 0-3 or on second treatment, BID, and PRN for scores above 3.    No Indications - adjust the frequency to every 6 hours PRN wheezing or bronchospasm, if no treatments needed after 48 hours then discontinue using Per Protocol order mode.     If indication present, adjust the RT bronchodilator orders based on the Bronchodilator Assessment Score as indicated below.  Use Inhaler orders unless patient has one or more of the following: on home nebulizer, not able to hold breath for 10 seconds, is not alert and oriented, cannot activate and use MDI correctly, or respiratory rate 25 breaths per minute or more, then use the equivalent nebulizer order(s) with same Frequency and PRN reasons based on the score.  If a patient is on this medication at home then do not decrease Frequency below that used at home.    0-3 - enter or revise RT bronchodilator order(s) to equivalent RT  Bronchodilator order with Frequency of every 4 hours PRN for wheezing or increased work of breathing using Per Protocol order mode.        4-6 - enter or revise RT Bronchodilator order(s) to two equivalent RT bronchodilator orders with one order with BID Frequency and one order with Frequency of every 4 hours PRN wheezing or increased work of breathing using Per Protocol order mode.        7-10 - enter or revise RT Bronchodilator order(s) to two equivalent RT bronchodilator orders with one order with TID Frequency and one order with Frequency of every 4 hours PRN wheezing or increased work of breathing using Per Protocol order mode.       11-13 - enter or revise RT Bronchodilator order(s) to one equivalent RT bronchodilator order with QID Frequency and an Albuterol order with Frequency of every 4 hours PRN wheezing or increased work of breathing using Per Protocol order mode.      Greater than 13 - enter or revise RT Bronchodilator order(s) to one equivalent RT bronchodilator order with every 4 hours Frequency and an Albuterol order with Frequency of every 2 hours PRN wheezing or increased work of breathing using Per Protocol order mode.     RT to enter RT Home Evaluation for COPD & MDI Assessment order using Per Protocol order mode.    Electronically signed by Shannon Krishnan RCP on 8/30/2024 at 8:22 AM

## 2024-08-30 NOTE — CONSULTS
[facility]  Initial Consult Note  Date: 8/30/2024    Hospital day :2     Impression:   COVID-19 virus infection initially tested + 8/25/2024 at the facility and repeat testing here + 8/29/2024  Acute on chronic hypoxic respiratory failure currently on high flow O2 by nasal cannula  Acute exacerbation of COPD  Acute on chronic congestive heart failure  Concern for secondary bacterial pneumonia  Diabetes mellitus type 2  Essential hypertension  Chronic kidney disease stage IIIa    Recommendations   The patient was already on antiviral therapy with molnupiravir at the facility-is not clear how many days the patient had completed and if the medication can be retrieved from the facility the patient can continue this medication  The hypoxia seems more likely to be related to congestive heart failure than COVID-pneumonia  The CRP is not elevated  The patient continues on Rocephin and azithromycin for bacterial coverage  Continue diuretic therapy    Chief complaint/reason for consultation:   COVID-19 virus infection    History of Present Illness:   Shanique Bender is a 78 y.o.-year-old female who was initially admitted on 8/28/2024.   Shanique is a poor historian and the history is obtained from reviewing the chart.  The patient reports that she was brought in by her daughter and she is not sure why she was brought in.  The patient does have COPD, chronic hypoxic respiratory failure on 3 L of oxygen at home, diabetes mellitus type 2, chronic kidney disease stage IIIa, hypertension, chronic atrial fibrillation not on anticoagulant, anemia, gastroesophageal reflux disease, history of pontine stroke, congestive heart failure-systolic with preserved ejection fraction among other medical problems    The patient reportedly tested +3 days ago and was started on antiviral therapy with molnupiravir but she apparently got progressively more short of breath and needed increase in supplemental oxygen to 6 L by nasal  vision.  ENT: No sore throat or runny nose.  Cardiovascular: No chest pain or palpitations.  Lung: No shortness of breath or cough.  Abdomen: No nausea, vomiting, diarrhea, or abdominal pain.  Genitourinary: No increased urinary frequency, or dysuria.  Musculoskeletal: No muscle aches or pains.  Hematologic: No bleeding or bruising.  Neurologic: No headache, weakness, numbness, or tingling.    Physical Examination :   BP (!) 144/74   Pulse 69   Temp 98.4 °F (36.9 °C) (Oral)   Resp 25   Ht 1.702 m (5' 7\")   Wt 102.1 kg (225 lb)   LMP  (LMP Unknown)   SpO2 92%   BMI 35.24 kg/m²     Temperature Range: Temp: 98.4 °F (36.9 °C) Temp  Av.1 °F (36.7 °C)  Min: 97.7 °F (36.5 °C)  Max: 98.4 °F (36.9 °C)  General Appearance: Awake, alert, and in no apparent distress  Head: Normocephalic, without obvious abnormality, atraumatic  Eyes: Pupils equal, round, reactive, to light and accommodation; extraocular movements intact; sclera anicteric; conjunctivae pink  ENT: Oropharynx clear, without erythema, exudate, or thrush.  Neck: Supple, without lymphadenopathy.   Pulmonary/Chest: Diffuse crackles bilaterally consistent with congestive heart failure and there is some bronchial breath sounds in the right upper lobe area  Cardiovascular: Regular rate and rhythm without murmurs, rubs, or gallops.   Abdomen: Soft, nontender, nondistended.  Extremities: No cyanosis, clubbing, edema, or effusions.  Neurologic: No gross sensory or motor deficits.    Skin: Warm and dry with no rash.    Medical Decision Making:   I have independently reviewed/ordered the following labs:  CBC with Differential:   Recent Labs     24  0625   WBC 6.9 5.1   HGB 11.6* 11.7*   HCT 36.8 37.6    150   LYMPHOPCT 31  --    MONOPCT 9  --    EOSPCT 0*  --      BMP:   Recent Labs     24  2105 24  0058 24  0625   * 140 136   K 3.7 4.1 4.6   * 109* 103   CO2 22 20 20   BUN 26* 27* 40*   CREATININE 1.5*  The specimen is POSITIVE for SARS-Cov-2, the novel coronavirus associated with  COVID-19.       This test has been authorized by the FDA under an Emergency Use Authorization (EUA) for use  by authorized laboratories.       The ID NOW COVID-19 assay is designed to detect the virus that causes COVID-19 in patients  with signs and symptoms of infection who are suspected of COVID-19.  An individual without symptoms of COVID-19 and who is not shedding SARS-CoV-2 virus would  expect to have a negative (not detected) result in this assay.  Fact sheet for Healthcare Providers: https://www.fda.gov/media/067787/download  Fact sheet for Patients: https://www.fda.gov/media/149618/download       Methodology: Isothermal Nucleic Acid Amplification       Results reported to the appropriate Health Department          Electronically signed by Pat Peterson MD on 8/30/2024 at 7:48 AM      [facility]  Pat Peterson MD  [employer] messaging  OFFICE: [phone]    Thank you for allowing us to participate in the care of this patient. Please call with questions.     This note is created with the assistance of a speech recognition program.  While intending to generate a document that actually reflects the content of the visit, the document can still have some errors including those of syntax and sound a like substitutions which may escape proof reading.  In such instances, actual meaning can be extrapolated by contextual diversion.

## 2024-08-31 NOTE — DEATH NOTES
Death Pronouncement Note  Patient's Name: Shanique Bender   Patient's YOB: 1945  MRN Number: [id number]    Admitting Provider: No admitting provider for patient encounter.  Attending Provider: Hasmukh Hebert DO    Patient was examined and the following were absent: Pulses, Blood Pressure, and Respiratory effort    I declared the patient dead on 8/31/2024 at 12:50 AM    Preliminary Cause of Death: Respiratory failure     Electronically signed by LISA Fuller on 8/31/24 at 12:58 AM EDT

## 2024-08-31 NOTE — PROGRESS NOTES
Spiritual Health Assessment/Progress Note  [facility]    (P) Initial Encounter, Spiritual/Emotional Needs,  , (P) Life Adjustments, Adjustment to illness,      Name: Shanique Bender MRN: [id number]    Age: 78 y.o.     Sex: female   Language: English   Zoroastrianism: Anglican   Acute hypoxic respiratory failure (HCC)     Date: 8/30/2024            Total Time Calculated: (P) 15 min              Spiritual Assessment began in [facility]        Referral/Consult From: (P) Rounding   Encounter Overview/Reason: (P) Initial Encounter, Spiritual/Emotional Needs  Service Provided For: (P) Patient                            provided support and pastoral care to Pt. Pt was welcoming and friendly. Provided active active listening and encouragement to Pt. Prayer was offered to Pt for comfort support and in response to request.    Bea, Belief, Meaning:   Patient identifies as spiritual, is connected with a bea tradition or spiritual practice, and has beliefs or practices that help with coping during difficult times  Family/Friends No family/friends present      Importance and Influence:  Patient has spiritual/personal beliefs that influence decisions regarding their health  Family/Friends no family/friends present    Community:  Patient is connected with a spiritual community and feels well-supported. Support system includes: Children and Bea Community  Family/Friends Other: no family present    Assessment and Plan of Care:     Patient Interventions include: Facilitated expression of thoughts and feelings  Family/Friends Interventions include: Other: no family present    Patient Plan of Care: Spiritual Care available upon further referral  Family/Friends Plan of Care: Other: no family    Electronically signed by Chaplain BHAVIN on 8/30/2024 at 8:08 PM    08/30/24 2006   Encounter Summary   Encounter Overview/Reason Initial Encounter;Spiritual/Emotional Needs   Service Provided For Patient

## 2024-08-31 NOTE — SIGNIFICANT EVENT
[facility]  Office: [phone]  José Miguel Hebert, DO, Jaime Interiano, DO, Terrell Long DO, Bernabe Mcclain, DO, Anila Lindquist MD, Katie Walker MD, Philip Morrissey MD, Flakita Christianson MD,  Jakub Hoffman MD, Veronica Ireland MD, Steven Camacho MD,  Aundrea Mosqueda DO, Tish Baker MD, Nicolás Sanchez MD, Hasmukh Hebert DO, Barbara Casper MD,  Hao Rogers DO, Marley Kuo MD, Huma Stone MD, Sarah oCle MD, Kuldip Aldridge MD,  Talon Gates MD, Roxana Sams MD, Fito Bermudez MD, Anat De La Garza MD, Simeon Mike MD, Liz Munoz MD, Vaughn Scott, DO, Villa Suero DO, James Thakkar DO, Saniya Segura MD,  Patrick Shell MD, Shirley Waterhouse, CNP,  Zita Karimi, CNP, Vaughn Go, CNP,  Angelita Dinero, DNP, Cece Hawthorne, CNP, Hayley Bruner, CNP, Hanna Lane, CNP, Joi Shay, CNP, Orly Chang, PA-C, Ruchi Trinidad, PA-C, Patricia Thompson, CNP, Giovanni Meza, CNP,  Ceel Gtz, CNP, Preethi Hogan, CNP, Kindra Reeves, CNP, Sherry Gomez, CNS, Antonia Humphreys, CNP, Jackie Lester CNP, Tracy Schwab, CNP         [facility]   Nighttime In-House Provider      8/31/2024    12:27 AM    Name:   Shanique Bender  MRN:     [id number]     Acct:      [id number]   Room:   [address] [place] [address]  IP Day:  3  Admit Date:  8/28/2024  7:58 PM    PCP:   Karma Patrick MD  Code Status:  DNR-CCA    Called to the floor by staff to evaluate Shanique Bender in [facility] at 12:27 AM with complaint of patient unresponsive.  According to RN, patient had change in mental status after a coughing episode approximately 20 minutes prior.  She became unresponsive shortly thereafter.  Upon my arrival she had respiratory distress and labored breathing, palpable pulses.  We called stroke alert and notified the critical care physician.  Telestroke team logged on via video call and ultimately we were unable to stabilize the patient to get imaging.  Soon thereafter, her BP became soft and heart

## 2024-08-31 NOTE — DEATH NOTES
Death Pronouncement Note  Patient's Name: Shanique Bender   Patient's YOB: 1945  MRN Number: [id number]    Admitting Provider: No admitting provider for patient encounter.  Attending Provider: Hasmukh Hebert DO    Patient was examined and the following were absent: Pulses, Blood Pressure, and Respiratory effort    I declared the patient dead on 8/31/2024 at 12:50 AM    Preliminary Cause of Death: Respiratory failure     Electronically signed by LISA Fuller on 8/31/24 at 12:58 AM EDT

## 2024-08-31 NOTE — PROGRESS NOTES
SPIRITUAL CARE DEPARTMENT [facility]   Patient Death Note  DEATH                  Room # 341/341-01   Name: Shanique Bender            Age: 78 y.o.  Gender: female          Confucianist: Baptism      Place of Mormon: Not Available  Admit Date & Time: 2024  7:58 PM        Actual date of death: 2024   TOD: 00:50       Referral:  Unit: Progressive care  Nurse: Rufina Pendleton    SITUATION AT DEATH:  Unexpected respiratory failure, Extended Family gathered around     IS THIS A 'S CASE?  No    SPIRITUAL/EMOTIONAL INTERVENTION:  Writer met with Family in Room of  and engaged in Prayer with Them       DOCTOR SIGNING DEATH CERTIFICATE:  Name: Hasmukh Hebert  Phone number:  [phone]    Copy of COMPLETED Release of Body Form Received?  Yes    Patient's belongings: With family     HOME:  Contacted Time 02:23 AM  Name: [facility]  City: [place], [place]  Phone Number: [phone]    NEXT OF KIN:  Name: Cary Shook  Relationship: Daughter  Street Address: [address] [place] [address]  City: [place]  State: [facility]  Zip code: [id number]   Phone Number: [phone]    ANY FOLLOW-UP NEEDED?  No    IF SO, WHAT?  N/A    Electronically signed by Chaplain May, on 2024 at 1:58 AM.  Spiritual Care Department  [facility]  [phone]        24 0151   Encounter Summary   Encounter Overview/Reason Spiritual/Emotional Needs;Family Care   Service Provided For Family  (Pt. )   Referral/Consult From Nurse   Support System Family members   Last Encounter  24   Complexity of Encounter High   Begin Time 0150   End Time  0250   Total Time Calculated 60 min   Crisis   Type Family Care   Spiritual/Emotional needs   Type Spiritual Support;Emotional Distress;Other (comment)  (Pt. End-of-Life)   Rituals, Rites and Sacraments   Type Blessings   Grief, Loss, and Adjustments   Type End of Life;Death;Bereavement Care   Assessment/Intervention/Outcome   Assessment

## 2024-08-31 NOTE — DISCHARGE SUMMARY
[facility]  Office: [phone]  José Miguel Hebert DO, Jaime Interiano DO, Terrell Long DO, Bernabe Mcclain DO, Anila Lindquist MD, Katie Walker MD, Philip Morrissey MD, Flakita Christianson MD,  Jakub Hoffman MD, Veronica Ireland MD, Steven Camacho MD,  Aundrea Mosqueda DO, Tish Baker MD, Nicolás Sanchez MD, Hasmukh Hebert DO, Barbara Casper MD,  Hao Rogers DO, Marley Kuo MD, Huma Stone MD, Sarah Cole MD, Kuldip Aldridge MD,  Talon Gates MD, Roxana Sams MD, Fito Bermudez MD, Anat De La Garza MD, Simeon Mike MD, Liz Munoz MD, Vaughn Scott DO, [place] DO, James Thakkar DO, Saniya Segura MD,  Patrick Shell MD, Shirley Waterhouse, CNP,  Zita Karimi, CNP, Vaughn Go, CNP,  Angelita Dinero, DNP, Cece Hawthorne, CNP, Hayley Bruner, CNP, Hanna Lane, CNP, Joi Shay, CNP, Orly Chang PA-C, CALOS MurilloC, Patricia Thompson, CNP, Giovanni Meza, CNP,  Cele Gtz, CNP, Preethi Hogan, CNP, Kindra Reeves, CNP, Sherry Gomez, CNS, Antonia Humphreys, CNP, Jackie Lester, CNP, Tracy Schwab, CNP         [facility]   IN-PATIENT SERVICE   [facility]    Discharge Summary     Patient ID: Shanique Bender  :  1945   MRN: [id number]     ACCOUNT:  [id number]   Patient's PCP: Karma Patrick MD  Admit Date: 2024   Discharge Date: 2024  Length of Stay: 3  Code Status:  DNR-CCA  Admitting Physician: No admitting provider for patient encounter.  Discharge Physician: LISA Fuller     Active Discharge Diagnoses:     Hospital Problem Lists:  Principal Problem:    Acute hypoxic respiratory failure (HCC)  Active Problems:    Chronic obstructive pulmonary disease (HCC)    Oxygen dependent    CHF (congestive heart failure) (HCC)    Type 1 diabetes mellitus with stage 3 chronic kidney disease (HCC)    Chronic renal disease, stage III (HCC) [464588]    Essential (primary) hypertension    COVID-19    GUSTABO (acute kidney injury) (HCC)  Resolved
